# Patient Record
Sex: MALE | Race: WHITE | Employment: FULL TIME | URBAN - METROPOLITAN AREA
[De-identification: names, ages, dates, MRNs, and addresses within clinical notes are randomized per-mention and may not be internally consistent; named-entity substitution may affect disease eponyms.]

---

## 2017-01-03 ENCOUNTER — OFFICE VISIT (OUTPATIENT)
Dept: PAIN MANAGEMENT | Age: 62
End: 2017-01-03

## 2017-01-03 VITALS
SYSTOLIC BLOOD PRESSURE: 153 MMHG | WEIGHT: 196 LBS | HEIGHT: 70 IN | HEART RATE: 79 BPM | BODY MASS INDEX: 28.06 KG/M2 | DIASTOLIC BLOOD PRESSURE: 78 MMHG

## 2017-01-03 DIAGNOSIS — Z79.899 ENCOUNTER FOR LONG-TERM (CURRENT) DRUG USE: ICD-10-CM

## 2017-01-03 DIAGNOSIS — Z79.899 ENCOUNTER FOR LONG-TERM (CURRENT) USE OF HIGH-RISK MEDICATION: ICD-10-CM

## 2017-01-03 DIAGNOSIS — M47.816 SPONDYLOSIS OF LUMBAR REGION WITHOUT MYELOPATHY OR RADICULOPATHY: Primary | ICD-10-CM

## 2017-01-03 DIAGNOSIS — M54.16 LUMBAR RADICULITIS: ICD-10-CM

## 2017-01-03 DIAGNOSIS — G89.4 CHRONIC PAIN SYNDROME: ICD-10-CM

## 2017-01-03 LAB
ALCOHOL UR POC: NORMAL
AMPHETAMINES UR POC: NORMAL
BARBITURATES UR POC: NORMAL
BENZODIAZEPINES UR POC: NORMAL
BUPRENORPHINE UR POC: NORMAL
CANNABINOIDS UR POC: NORMAL
CARISOPRODOL UR POC: NORMAL
COCAINE UR POC: NORMAL
FENTANYL UR POC: NORMAL
MDMA/ECSTASY UR POC: NORMAL
METHADONE UR POC: NORMAL
METHAMPHETAMINE UR POC: NORMAL
METHYLPHENIDATE UR POC: NORMAL
OPIATES UR POC: NORMAL
OXYCODONE UR POC: NORMAL
PHENCYCLIDINE UR POC: NORMAL
PROPOXYPHENE UR POC: NORMAL
TRAMADOL UR POC: NORMAL
TRICYCLICS UR POC: NORMAL

## 2017-01-03 RX ORDER — VARDENAFIL HYDROCHLORIDE 20 MG/1
20 TABLET ORAL AS NEEDED
Qty: 10 TAB | Refills: 12 | Status: SHIPPED | OUTPATIENT
Start: 2017-01-03 | End: 2017-12-11 | Stop reason: ALTCHOICE

## 2017-01-03 RX ORDER — HYDROCODONE BITARTRATE AND ACETAMINOPHEN 10; 325 MG/1; MG/1
.5-1 TABLET ORAL
Qty: 180 TAB | Refills: 0 | Status: SHIPPED | OUTPATIENT
Start: 2017-01-20 | End: 2017-01-03 | Stop reason: SDUPTHER

## 2017-01-03 RX ORDER — HYDROCODONE BITARTRATE AND ACETAMINOPHEN 10; 325 MG/1; MG/1
.5-1 TABLET ORAL
Qty: 180 TAB | Refills: 0 | Status: SHIPPED | OUTPATIENT
Start: 2017-03-19 | End: 2017-03-30 | Stop reason: SDUPTHER

## 2017-01-03 RX ORDER — HYDROCODONE BITARTRATE AND ACETAMINOPHEN 10; 325 MG/1; MG/1
.5-1 TABLET ORAL
Qty: 180 TAB | Refills: 0 | Status: SHIPPED | OUTPATIENT
Start: 2017-02-18 | End: 2017-01-03 | Stop reason: SDUPTHER

## 2017-01-03 NOTE — PROGRESS NOTES
Nursing Notes    Patient presents to the office today in follow-up. Patient rates his pain at 5/10 on the numerical pain scale. Reviewed medications with counts as follows:    Rx Date filled Qty Dispensed Pill Count Last Dose Short   norco 10/325 mg  12/22/16 180 102 today no                                   POC UDS was performed in office today    Any new labs or imaging since last appointment? YES. Pt states that he had a chest x-ray done with work for a physical    Have you been to an emergency room (ER) or urgent care clinic since your last visit? NO            Have you been hospitalized since your last visit? NO     If yes, where, when, and reason for visit? Have you seen or consulted any other health care providers outside of the [address] [place] [address]  since your last visit? NO     If yes, where, when, and reason for visit? HM deferred to pcp.

## 2017-01-03 NOTE — MR AVS SNAPSHOT
Visit Information Date & Time Provider Department Dept. Phone Encounter #  
 1/3/2017  8:20 AM Idolina Pals Dartha Boas, PA-C [address] for Pain Management [phone] [id number] Follow-up Instructions Return in about 3 months (around 4/3/2017). Follow-up and Disposition History Your Appointments 3/30/2017  8:00 AM  
Follow Up with Marine Leon PA-C [address] for Pain Management (GABY SCHEDULING) Appt Note: Return in about 3 months (around 4/3/2017). per KG. ..mom  
 [address] [facility] [address]  
[phone] [id number] [id number] 5/25/2017  8:30 AM  
PHYSICAL with gS Garza MD  
Internist of [address]) Appt Note: ov [address]; rpe [address] rd  
 [address] [facility], [address] [address] [address] [place]  
  
   
 [address], [address] Upcoming Health Maintenance Date Due Hepatitis C Screening 1955 COLONOSCOPY 11/16/2020 DTaP/Tdap/Td series (2 - Td) 11/14/2026 Allergies as of 1/3/2017  Review Complete On: 1/3/2017 By: Crystal Johnson LPN Severity Noted Reaction Type Reactions Lisinopril    Other (comments) ED Current Immunizations  Reviewed on 5/16/2016 Name Date H1N1 FLU VACCINE 11/11/2009 Influenza Vaccine (Quad) PF 11/14/2016, 11/4/2015 Influenza Vaccine Split 11/21/2012 Influenza Vaccine Whole 10/28/2009 Tdap 11/14/2016 Zoster 7/6/2012  9:12 AM  
  
 Not reviewed this visit You Were Diagnosed With   
  
 Codes Comments Spondylosis of lumbar region without myelopathy or radiculopathy    -  Primary ICD-10-CM: M47.816 ICD-9-CM: 615. 3 Chronic pain syndrome     ICD-10-CM: G89.4 ICD-9-CM: 338.4 Lumbar radiculitis     ICD-10-CM: M54.16 
ICD-9-CM: 724.4 Encounter for long-term (current) drug use     ICD-10-CM: Z79.899 ICD-9-CM: V58.69 Vitals BP Pulse Height(growth percentile) Weight(growth percentile) BMI Smoking Status 153/78 79 5' 10\" (1.778 m) 196 lb (88.9 kg) 28.12 kg/m2 Former Smoker BMI and BSA Data Body Mass Index Body Surface Area  
 28.12 kg/m 2 2.1 m 2 Preferred Pharmacy Pharmacy Name Phone [address], [address] [place] [address] [phone] Your Updated Medication List  
  
   
This list is accurate as of: 1/3/17  9:08 AM.  Always use your most recent med list.  
  
  
  
  
 atorvastatin 10 mg tablet Commonly known as:  LIPITOR Take 1 Tab by mouth daily. CIALIS 5 mg tablet Generic drug:  tadalafil  
take 1 tablet by mouth once daily DEPO-TESTOSTERONE 100 mg/mL injection Generic drug:  testosterone cypionate  
inject 100 milligrams intramuscularly every 10 days  
  
 diazePAM 10 mg tablet Commonly known as:  VALIUM Take 1 Tab by mouth every six (6) hours as needed for Anxiety. Max Daily Amount: 40 mg. FISH OIL 1,000 mg Cap Generic drug:  omega-3 fatty acids-vitamin e Take 1 Cap by mouth. HYDROcodone-acetaminophen  mg tablet Commonly known as:  Racheal Freddy Take 0.5-1 Tabs by mouth every four (4) hours as needed for Pain (chronic) for up to 30 days. Indications: PAIN Start taking on:  3/19/2017 * losartan-hydroCHLOROthiazide 100-25 mg per tablet Commonly known as:  HYZAAR Take 1 Tab by mouth daily. * losartan-hydroCHLOROthiazide 100-12.5 mg per tablet Commonly known as:  HYZAAR  
take 1 tablet by mouth once daily MULTI FOR HIM PO Take  by mouth. Safety Needles 18 gauge x 1 1/2\" Ndle Commonly known as:  BD SAFETYGLIDE NEEDLE Use to draw up testosterone and then change needle to give testosterone. Syringe (Disposable) 3 mL Syrg Pt to use with Testosterone injections. TENS Units Luis Angel Safe Use as directed  
  
 vardenafil 20 mg tablet Commonly known as:  LEVITRA Take 20 mg by mouth as needed. * Notice: This list has 2 medication(s) that are the same as other medications prescribed for you. Read the directions carefully, and ask your doctor or other care provider to review them with you. Prescriptions Printed Refills HYDROcodone-acetaminophen (NORCO)  mg tablet 0 Starting on: 3/19/2017 Sig: Take 0.5-1 Tabs by mouth every four (4) hours as needed for Pain (chronic) for up to 30 days. Indications: PAIN Class: Print Route: Oral  
  
Follow-up Instructions Return in about 3 months (around 4/3/2017). Patient Instructions Plan: 
Continue same medications as prescribed for chronic pain 
Consider trying Hysingla ER and reducing your Norco 
Follow up in 3 months or sooner if needed Regular exercise and attention to emotional health and diet remain the most effective ways to treat chronic pain of all kinds You may contact me with questions or concerns through [address] Patient Instructions History Introducing [address]! Dear Colon: Thank you for requesting a esolidar account. Our records indicate that you already have an active esolidar account. You can access your account anytime at [email or web address] Did you know that you can access your hospital and ER discharge instructions at any time in esolidar? You can also review all of your test results from your hospital stay or ER visit. Additional Information If you have questions, please visit the Frequently Asked Questions section of the esolidar website at [email or web address] Remember, esolidar is NOT to be used for urgent needs. For medical emergencies, dial 911. Now available from your iPhone and Android! Please provide this summary of care documentation to your next provider. Your primary care clinician is listed as Timmy Christianson.  If you have any questions after today's visit, please call [phone].

## 2017-01-03 NOTE — PROGRESS NOTES
HISTORY OF PRESENT ILLNESS  Abraham Nava is a 57 y.o. male. Patient presents for follow up of chronic pain due to lumbar spondylosis, lumbar postlaminectomy syndrome, chronic right lower radiculopathy, and right hip osteoarthritis. He reports that his pain has been worse over the past few days, to which he attributes the recent rainy, cold weather. Pain continues to predominate in the lower back and right hip, radiating to anterior thigh. He describes his pain as shooting, stabbing, and stiff. Pain worsens with prolonged standing, bending, and squatting. He denies saddle anesthesia or bowel/bladder dysfunction. Pt reports average of 3-6/10 pain scale most days depending on activity. He continues to work full time as a  with [employer]. Medications continue to work well for pain control overall. Abraham Nava is tolerating medications well, with no untoward side effects noted. He is able to stay more active with less discomfort with these current doses. The patient reports an average of 50% relief with this regimen. Most recent UDS and  were consistent with prescribed medications. Pill counts are appropriate. He is informed of side effects, risks, and benefits of this regimen, and emphasizes that he derives a significant improvement in functionality and quality of life, and notes that non-opioid medications and therapies in the past have not offered significant benefit. We discussed changing his current regimen of six Norco per day to a long acting Hydrocodone to reduce his pill burden and acetaminophen exposure. He has tried and failed morphine, oxycodone, and tramadol in the past. He will consider trying Hysingla ER. We discussed the potential side effects, risks, and benefits of long-acting opioid management for chronic pain. He denies new or worsening insomnia or constipation issues. He denies any falls, injuries, or hospitalizations since the last visit.   We discussed his elevated blood pressure today--he reports that Dr. Yahir Monroe recently raised his Hyzaar dose to 20/25 per day. A total of 40 minutes was spent with the patient of which more than 50% of the time was spent counseling the patient. HPI--see above    ROS  Constitutional: Positive for malaise/fatigue. HENT: Negative for ear discharge and ear pain. Snoring   Eyes: Positive for blurred vision. Glasses   Genitourinary: Negative for dysuria and hematuria. Erectile Dysfunction   Musculoskeletal: Positive for myalgias, back pain, joint pain and neck pain. Stiffness  cramping   Neurological: Positive for tingling (legs) and sensory change (numbness legs). Negative for seizures and loss of consciousness. Physical Exam  Constitutional: He is oriented to person, place, and time. He appears well-developed and well-nourished. No distress. HENT:   Head: Normocephalic and atraumatic. Eyes: EOM are normal.   Musculoskeletal:        Right hip: He exhibits tenderness. Lumbar back: He exhibits decreased range of motion, bony tenderness, pain and spasm. Back:         Legs:  Areas of tenderness noted with red arrows  Marked spasms of lumbar paraspinous musculature noted   Brisk sacroiliac tenderness noted on right   Neurological: He is alert and oriented to person, place, and time. He displays no atrophy. No cranial nerve deficit or sensory deficit. He exhibits normal muscle tone. Gait abnormal. Coordination normal.   Psychiatric: He has a normal mood and affect. His behavior is normal. Judgment and thought content normal.   ASSESSMENT and PLAN    ICD-10-CM ICD-9-CM    1. Spondylosis of lumbar region without myelopathy or radiculopathy M47.816 721.3    2. Chronic pain syndrome G89.4 338.4 HYDROcodone-acetaminophen (NORCO)  mg tablet      DISCONTINUED: HYDROcodone-acetaminophen (NORCO)  mg tablet      DISCONTINUED: HYDROcodone-acetaminophen (NORCO)  mg tablet   3.  Lumbar radiculitis M54.16 724.4    4.  Encounter for long-term (current) drug use Z79.899 V58.69       Plan:  Continue same medications as prescribed for chronic pain  Consider trying Hysingla ER and reducing your Norco  Follow up in 3 months or sooner if needed  Regular exercise and attention to emotional health and diet remain the most effective ways to treat chronic pain of all kinds  You may contact me with questions or concerns through [address]

## 2017-01-03 NOTE — PATIENT INSTRUCTIONS
Plan:  Continue same medications as prescribed for chronic pain  Consider trying Hysingla ER and reducing your Norco  Follow up in 3 months or sooner if needed  Regular exercise and attention to emotional health and diet remain the most effective ways to treat chronic pain of all kinds  You may contact me with questions or concerns through [address]

## 2017-01-17 ENCOUNTER — TELEPHONE (OUTPATIENT)
Dept: INTERNAL MEDICINE CLINIC | Age: 62
End: 2017-01-17

## 2017-01-18 NOTE — TELEPHONE ENCOUNTER
Testosterone has been approved by his insurance from 11/19/16-1/18/2018.  Pts dispensing pharmacy is aware of approval. They will get it ready for him and call him to  when ready

## 2017-02-23 RX ORDER — DIAZEPAM 10 MG/1
10 TABLET ORAL
Qty: 30 TAB | Refills: 0 | Status: SHIPPED | OUTPATIENT
Start: 2017-02-23 | End: 2017-03-07 | Stop reason: SDUPTHER

## 2017-03-07 RX ORDER — DIAZEPAM 10 MG/1
TABLET ORAL
Qty: 30 TAB | Refills: 1 | OUTPATIENT
Start: 2017-03-07 | End: 2017-06-01 | Stop reason: SDUPTHER

## 2017-03-30 ENCOUNTER — OFFICE VISIT (OUTPATIENT)
Dept: PAIN MANAGEMENT | Age: 62
End: 2017-03-30

## 2017-03-30 VITALS
DIASTOLIC BLOOD PRESSURE: 94 MMHG | BODY MASS INDEX: 28.06 KG/M2 | SYSTOLIC BLOOD PRESSURE: 156 MMHG | HEIGHT: 70 IN | WEIGHT: 196 LBS | HEART RATE: 100 BPM

## 2017-03-30 DIAGNOSIS — Z98.1 S/P LUMBAR SPINAL FUSION: ICD-10-CM

## 2017-03-30 DIAGNOSIS — M47.816 SPONDYLOSIS OF LUMBAR REGION WITHOUT MYELOPATHY OR RADICULOPATHY: Primary | ICD-10-CM

## 2017-03-30 DIAGNOSIS — G89.4 CHRONIC PAIN SYNDROME: ICD-10-CM

## 2017-03-30 DIAGNOSIS — Z79.899 ENCOUNTER FOR LONG-TERM (CURRENT) DRUG USE: ICD-10-CM

## 2017-03-30 DIAGNOSIS — M54.16 LUMBAR RADICULITIS: ICD-10-CM

## 2017-03-30 DIAGNOSIS — R68.89 FLU-LIKE SYMPTOMS: ICD-10-CM

## 2017-03-30 RX ORDER — HYDROCODONE BITARTRATE AND ACETAMINOPHEN 10; 325 MG/1; MG/1
.5-1 TABLET ORAL
Qty: 180 TAB | Refills: 0 | Status: SHIPPED | OUTPATIENT
Start: 2017-04-17 | End: 2017-03-30 | Stop reason: SDUPTHER

## 2017-03-30 RX ORDER — OSELTAMIVIR PHOSPHATE 75 MG/1
75 CAPSULE ORAL 2 TIMES DAILY
Qty: 10 CAP | Refills: 0 | Status: SHIPPED | OUTPATIENT
Start: 2017-03-30 | End: 2017-04-04

## 2017-03-30 RX ORDER — HYDROCODONE BITARTRATE AND ACETAMINOPHEN 10; 325 MG/1; MG/1
.5-1 TABLET ORAL
Qty: 180 TAB | Refills: 0 | Status: SHIPPED | OUTPATIENT
Start: 2017-06-14 | End: 2017-06-30 | Stop reason: SDUPTHER

## 2017-03-30 RX ORDER — HYDROCODONE BITARTRATE AND ACETAMINOPHEN 10; 325 MG/1; MG/1
.5-1 TABLET ORAL
Qty: 180 TAB | Refills: 0 | Status: SHIPPED | OUTPATIENT
Start: 2017-05-16 | End: 2017-03-30 | Stop reason: SDUPTHER

## 2017-03-30 NOTE — MR AVS SNAPSHOT
Visit Information Date & Time Provider Department Dept. Phone Encounter #  
 3/30/2017  8:00 AM Radha [address] for Pain Management [phone] [id number] Follow-up Instructions Return in about 3 months (around 6/30/2017). Your Appointments 5/25/2017  8:30 AM  
PHYSICAL with Chapin Delong MD  
Internist of [facility]) Appt Note: ov 6mos. rd; rpe 6mos. rd  
 [address], [address] Cheril Timbo [address] [place]  
  
   
 [address], [address] Upcoming Health Maintenance Date Due Hepatitis C Screening 1955 COLONOSCOPY 11/16/2020 DTaP/Tdap/Td series (2 - Td) 11/14/2026 Allergies as of 3/30/2017  Review Complete On: 1/3/2017 By: Jorge Lizama LPN Severity Noted Reaction Type Reactions Lisinopril    Other (comments) ED Current Immunizations  Reviewed on 5/16/2016 Name Date H1N1 FLU VACCINE 11/11/2009 Influenza Vaccine (Quad) PF 11/14/2016, 11/4/2015 Influenza Vaccine Split 11/21/2012 Influenza Vaccine Whole 10/28/2009 Tdap 11/14/2016 Zoster 7/6/2012  9:12 AM  
  
 Not reviewed this visit You Were Diagnosed With   
  
 Codes Comments Spondylosis of lumbar region without myelopathy or radiculopathy    -  Primary ICD-10-CM: M47.816 ICD-9-CM: 945. 3 Chronic pain syndrome     ICD-10-CM: G89.4 ICD-9-CM: 338.4 Lumbar radiculitis     ICD-10-CM: M54.16 
ICD-9-CM: 724.4 S/P lumbar spinal fusion     ICD-10-CM: Z98.1 ICD-9-CM: V45.4 Encounter for long-term (current) drug use     ICD-10-CM: Z79.899 ICD-9-CM: V58.69 Flu-like symptoms     ICD-10-CM: R68.89 ICD-9-CM: 780.99 Vitals BP Pulse Height(growth percentile) Weight(growth percentile) BMI Smoking Status (!) 156/94 100 5' 10\" (1.778 m) 196 lb (88.9 kg) 28.12 kg/m2 Former Smoker Vitals History BMI and BSA Data Body Mass Index Body Surface Area  
 28.12 kg/m 2 2.1 m 2 Preferred Pharmacy Pharmacy Name Phone [address], [address] [place] [address] [phone] Your Updated Medication List  
  
   
This list is accurate as of: 3/30/17  8:39 AM.  Always use your most recent med list.  
  
  
  
  
 atorvastatin 10 mg tablet Commonly known as:  LIPITOR Take 1 Tab by mouth daily. CIALIS 5 mg tablet Generic drug:  tadalafil  
take 1 tablet by mouth once daily DEPO-TESTOSTERONE 100 mg/mL injection Generic drug:  testosterone cypionate  
inject 100 milligrams intramuscularly every 10 days  
  
 diazePAM 10 mg tablet Commonly known as:  VALIUM  
take 1 tablet by mouth every 6 hours if needed for anxiety FISH OIL 1,000 mg Cap Generic drug:  omega-3 fatty acids-vitamin e Take 1 Cap by mouth. HYDROcodone-acetaminophen  mg tablet Commonly known as:  Edda Arts Take 0.5-1 Tabs by mouth every four (4) hours as needed for Pain (chronic) for up to 30 days. Indications: Pain Start taking on:  6/14/2017 * losartan-hydroCHLOROthiazide 100-25 mg per tablet Commonly known as:  HYZAAR Take 1 Tab by mouth daily. * losartan-hydroCHLOROthiazide 100-12.5 mg per tablet Commonly known as:  HYZAAR  
take 1 tablet by mouth once daily MULTI FOR HIM PO Take  by mouth. oseltamivir 75 mg capsule Commonly known as:  TAMIFLU Take 1 Cap by mouth two (2) times a day for 5 days. Safety Needles 18 gauge x 1 1/2\" Ndle Commonly known as:  BD SAFETYGLIDE NEEDLE Use to draw up testosterone and then change needle to give testosterone. Syringe (Disposable) 3 mL Syrg Pt to use with Testosterone injections. TENS Units Elinda Plan Use as directed  
  
 vardenafil 20 mg tablet Commonly known as:  LEVITRA Take 20 mg by mouth as needed. * Notice:   This list has 2 medication(s) that are the same as other medications prescribed for you. Read the directions carefully, and ask your doctor or other care provider to review them with you. Prescriptions Printed Refills HYDROcodone-acetaminophen (NORCO)  mg tablet 0 Starting on: 6/14/2017 Sig: Take 0.5-1 Tabs by mouth every four (4) hours as needed for Pain (chronic) for up to 30 days. Indications: Pain Class: Print Route: Oral  
  
Prescriptions Sent to Pharmacy Refills  
 oseltamivir (TAMIFLU) 75 mg capsule 0 Sig: Take 1 Cap by mouth two (2) times a day for 5 days. Class: Normal  
 Pharmacy: [facility] [address] #: [phone] Route: Oral  
  
Follow-up Instructions Return in about 3 months (around 6/30/2017). Patient Instructions Plan: 
Continue same medications as prescribed for chronic pain Given your low grade fever, body aches, and cough, as well as exposure to flu, we will start Tamiflu for five days Get plenty of rest, fluids, and take ibuprofen for fever and aches See your PCP if symptoms do not improve in 5 days, or if they worsen Delsym (extended-release dextromethorphan) is the strongest over the counter cough syrup and is safe to take with your pain medications Follow up in 3 months or sooner if needed Regular exercise and attention to emotional health and diet remain the most effective ways to treat chronic pain of all kinds You may contact me with questions or concerns through [address] [facility] [place] [facility]! Dear Abraham: Thank you for requesting a C3 Jian account. Our records indicate that you already have an active C3 Jian account. You can access your account anytime at [email or web address]/A & A Custom Cornhole Did you know that you can access your hospital and ER discharge instructions at any time in C3 Jian? You can also review all of your test results from your hospital stay or ER visit. Additional Information If you have questions, please visit the Frequently Asked Questions section of the XM Radiohart website at [email or web address] Remember, [email or web address] is NOT to be used for urgent needs. For medical emergencies, dial 911. Now available from your iPhone and Android! Please provide this summary of care documentation to your next provider. Your primary care clinician is listed as Chanda Stewart. If you have any questions after today's visit, please call [phone].

## 2017-03-30 NOTE — PATIENT INSTRUCTIONS
Plan:  Continue same medications as prescribed for chronic pain  Given your low grade fever, body aches, and cough, as well as exposure to flu, we will start Tamiflu for five days  Get plenty of rest, fluids, and take ibuprofen for fever and aches  See your PCP if symptoms do not improve in 5 days, or if they worsen  Delsym (extended-release dextromethorphan) is the strongest over the counter cough syrup and is safe to take with your pain medications  Follow up in 3 months or sooner if needed  Regular exercise and attention to emotional health and diet remain the most effective ways to treat chronic pain of all kinds  You may contact me with questions or concerns through [employer]

## 2017-03-30 NOTE — PROGRESS NOTES
HISTORY OF PRESENT ILLNESS  Abraham Carter is a 57 y.o. male. Patient presents for follow up of chronic pain due to lumbar spondylosis, lumbar postlaminectomy syndrome, chronic right lower radiculopathy, and right hip osteoarthritis. He reports that he is feeling quite poorly today, noting that he woke up with chills, body aches, and dry cough. His daughter is currently at home with influenza. He has received his vaccine, however, given these symptoms, we will start him on Tamiflu for five days, with instructions on follow-up if symptoms worsen or do not improve. We also discussed the normal course of influenza, with instructions on self-care during the course of the illness. Back pain remains constant but stable, although his illness today has amplified achiness and discomfort. Pain continues to predominate in the lower back and right hip, radiating to anterior thigh. He describes his pain as shooting, stabbing, and stiff. Pain worsens with prolonged standing, bending, and squatting. He denies saddle anesthesia or bowel/bladder dysfunction. Pt reports average of 3-6/10 pain scale most days depending on activity. He continues to work full time as a  with [employer]. Medications continue to work well for pain control overall. Abraham Carter is tolerating medications well, with no untoward side effects noted. He is able to stay more active with less discomfort with these current doses. The patient reports an average of 60% relief with this regimen. Most recent UDS and  were consistent with prescribed medications. Pill counts are appropriate. He is informed of side effects, risks, and benefits of this regimen, and emphasizes that he derives a significant improvement in functionality and quality of life, and notes that non-opioid medications and therapies in the past have not offered significant benefit. He denies new or worsening insomnia or constipation issues.  He denies any falls, injuries, or hospitalizations since the last visit. HPI--see above    ROS  Constitutional: Positive for malaise/fatigue. HENT: Negative for ear discharge and ear pain. Snoring   Eyes: Positive for blurred vision. Glasses   Genitourinary: Negative for dysuria and hematuria. Erectile Dysfunction   Musculoskeletal: Positive for myalgias, back pain, joint pain and neck pain. Stiffness  cramping   Neurological: Positive for tingling (legs) and sensory change (numbness legs). Negative for seizures and loss of consciousness. Physical Exam  Constitutional: He is oriented to person, place, and time. He appears well-developed and well-nourished. Ill-appearing. HENT:   Head: Normocephalic and atraumatic. Eyes: EOM are normal.   Respiratory: no rales, rhonchi, or wheezes noted. No distress  Musculoskeletal:        Right hip: He exhibits tenderness. Lumbar back: He exhibits decreased range of motion, bony tenderness, pain and spasm. Back:         Legs:  Areas of tenderness noted with red arrows  Marked spasms of lumbar paraspinous musculature noted    Neurological: He is alert and oriented to person, place, and time. He displays no atrophy. No cranial nerve deficit or sensory deficit. He exhibits normal muscle tone. Gait abnormal. Coordination normal.   Psychiatric: He has a normal mood and affect. His behavior is normal. Judgment and thought content normal.   ASSESSMENT and PLAN  Encounter Diagnoses     ICD-10-CM ICD-9-CM   1. Spondylosis of lumbar region without myelopathy or radiculopathy M47.816 721.3   2. Chronic pain syndrome G89.4 338.4   3. Lumbar radiculitis M54.16 724.4   4. S/P lumbar spinal fusion Z98.1 V45.4   5. Encounter for long-term (current) drug use Z79.899 V58.69   6.  Flu-like symptoms R68.89 780.99      Plan:  Continue same medications as prescribed for chronic pain  Given your low grade fever, body aches, and cough, as well as exposure to flu, we will start Tamiflu for five days  Get plenty of rest, fluids, and take ibuprofen for fever and aches  See your PCP if symptoms do not improve in 5 days, or if they worsen  Follow up in 3 months or sooner if needed  Regular exercise and attention to emotional health and diet remain the most effective ways to treat chronic pain of all kinds  You may contact me with questions or concerns through [address]

## 2017-03-30 NOTE — PROGRESS NOTES
Nursing Notes    Patient presents to the office today in follow-up. Patient rates his pain at 7/10 on the numerical pain scale. Reviewed medications with counts as follows:    Rx Date filled Qty Dispensed Pill Count Last Dose Short   norco 10/325 mg  03/19/17 180 114 today no                                     POC UDS was not performed in office today    Any new labs or imaging since last appointment? NO    Have you been to an emergency room (ER) or urgent care clinic since your last visit? NO            Have you been hospitalized since your last visit? NO     If yes, where, when, and reason for visit? Have you seen or consulted any other health care providers outside of the [address] [place] [address]  since your last visit? NO     If yes, where, when, and reason for visit? HM deferred to pcp.

## 2017-04-09 RX ORDER — ATORVASTATIN CALCIUM 10 MG/1
TABLET, FILM COATED ORAL
Qty: 90 TAB | Refills: 3 | Status: SHIPPED | OUTPATIENT
Start: 2017-04-09 | End: 2018-04-19 | Stop reason: SDUPTHER

## 2017-04-20 NOTE — TELEPHONE ENCOUNTER
From: Abraham Hatfield  To: Jennyfer Barrera MD  Sent: 4/20/2017 4:05 PM EDT  Subject: Medication Renewal Request    Original authorizing provider: MD Abraham Mccarthy Shanon Primrose would like a refill of the following medications:  DEPO-TESTOSTERONE 100 mg/mL injection Jennyfer Barrera MD]  Safety Whiteside (BD SAFETYGLIDE NEEDLE) 18 gauge x 1 1/2\" ndle Jennyfer Barrera MD]    Preferred pharmacy: [address] [place] [address] Ave:

## 2017-04-21 RX ORDER — TESTOSTERONE CYPIONATE 100 MG/ML
100 INJECTION, SOLUTION INTRAMUSCULAR
Qty: 10 VIAL | Refills: 5 | Status: SHIPPED | OUTPATIENT
Start: 2017-04-21 | End: 2017-10-21 | Stop reason: SDUPTHER

## 2017-04-21 RX ORDER — NEEDLES, SAFETY 18GX1 1/2"
NEEDLE, DISPOSABLE MISCELLANEOUS
Qty: 25 PEN NEEDLE | Refills: 1 | Status: SHIPPED | OUTPATIENT
Start: 2017-04-20 | End: 2017-10-12 | Stop reason: SDUPTHER

## 2017-05-17 ENCOUNTER — HOSPITAL ENCOUNTER (OUTPATIENT)
Dept: LAB | Age: 62
Discharge: HOME | End: 2017-05-17
Payer: COMMERCIAL

## 2017-05-17 DIAGNOSIS — E78.5 DYSLIPIDEMIA: ICD-10-CM

## 2017-05-17 DIAGNOSIS — E29.1 HYPOGONADISM MALE: ICD-10-CM

## 2017-05-17 DIAGNOSIS — Z11.59 NEED FOR HEPATITIS C SCREENING TEST: ICD-10-CM

## 2017-05-17 LAB
CHOLEST SERPL-MCNC: 139 MG/DL
ERYTHROCYTE [DISTWIDTH] IN BLOOD BY AUTOMATED COUNT: 13.2 % (ref 11.6–14.5)
HCT VFR BLD AUTO: 45.8 % (ref 36–48)
HCV AB SER IA-ACNC: 0.04 INDEX
HCV AB SERPL QL IA: NEGATIVE
HCV COMMENT,HCGAC: NORMAL
HDLC SERPL-MCNC: 45 MG/DL (ref 40–60)
HDLC SERPL: 3.1 {RATIO} (ref 0–5)
HGB BLD-MCNC: 15.7 G/DL (ref 13–16)
LDLC SERPL CALC-MCNC: 57.6 MG/DL (ref 0–100)
LIPID PROFILE,FLP: ABNORMAL
MCH RBC QN AUTO: 31.7 PG (ref 24–34)
MCHC RBC AUTO-ENTMCNC: 34.3 G/DL (ref 31–37)
MCV RBC AUTO: 92.3 FL (ref 74–97)
PLATELET # BLD AUTO: 217 K/UL (ref 135–420)
PMV BLD AUTO: 11.6 FL (ref 9.2–11.8)
PSA SERPL-MCNC: 0.6 NG/ML (ref 0–4)
RBC # BLD AUTO: 4.96 M/UL (ref 4.7–5.5)
TRIGL SERPL-MCNC: 182 MG/DL (ref ?–150)
VLDLC SERPL CALC-MCNC: 36.4 MG/DL
WBC # BLD AUTO: 7.2 K/UL (ref 4.6–13.2)

## 2017-05-17 PROCEDURE — 86803 HEPATITIS C AB TEST: CPT | Performed by: INTERNAL MEDICINE

## 2017-05-17 PROCEDURE — 80061 LIPID PANEL: CPT | Performed by: INTERNAL MEDICINE

## 2017-05-17 PROCEDURE — 36415 COLL VENOUS BLD VENIPUNCTURE: CPT | Performed by: INTERNAL MEDICINE

## 2017-05-17 PROCEDURE — 84153 ASSAY OF PSA TOTAL: CPT | Performed by: INTERNAL MEDICINE

## 2017-05-17 PROCEDURE — 85027 COMPLETE CBC AUTOMATED: CPT | Performed by: INTERNAL MEDICINE

## 2017-05-22 NOTE — PROGRESS NOTES
57 y.o. WHITE OR  male who presents for RPE    He continues to see pain mgmt. He's active but no set exercise routine. Continues to go to pain clinic and well controlled there    No GI or  complaints. The fiber has really helped a lot. Continues on testosterone replacement     No polyuria, polydipsia, nocturia, vision change, not checking sugars at this time.  Weight stable as below    Past Medical History:   Diagnosis Date    Chronic back pain     Dr. Adrian Tinsley Dyslipidemia     Erectile dysfunction     Fatty liver     US 2002, serologies negative; fib-4 1.06 from 3/14    FHx: colon cancer     H/O bone scan 3/14    negative outside of [facility]    HTN (hypertension)     Hypogonadism male     negative pituitary MRI 2013    Internal hemorrhoids 11/15    Dr Hector Barlow    Lower urinary tract symptoms (LUTS) 5/16    mild    Lumbago     MRI 1/14 and CT 3/14 w lumbar spinal stenosis severe    Lumbar post-laminectomy syndrome     Lumbar spinal stenosis     Prediabetes     2 hr GTT nl 2011    Prediabetes     2 hr gtt 124 from 10/11    Rosacea     S/P lumbar spinal fusion     Spasm of muscle     Status post lumbar laminectomy     Thoracic or lumbosacral neuritis or radiculitis, unspecified      Past Surgical History:   Procedure Laterality Date    CHEST SURGERY PROCEDURE UNLISTED      resection of a RUL lung lesion for recurring polyps 1980s    Mireille Colvin right 9/14    HX COLONOSCOPY      negative Dr. Tiny Chun 2005, 2010; Dr Saran Hearn 11/15 hemorrhoids    Brooklynn Iván  2012    Dr. Gracia Bruce right buttock    HX LUMBAR LAMINECTOMY       Social History     Social History    Marital status:      Spouse name: N/A    Number of children: 4    Years of education: N/A     Occupational History    sup [employer]      Social History Main Topics    Smoking status: Former Smoker     Types: Cigarettes    Smokeless tobacco: Never Used    Alcohol use 0.0 oz/week Comment: social    Drug use: No    Sexual activity: Not on file     Other Topics Concern    Not on file     Social History Narrative     Allergies   Allergen Reactions    Lisinopril Other (comments)     ED      Current Outpatient Prescriptions   Medication Sig    Safety Liberty (BD SAFETYGLIDE NEEDLE) 18 gauge x 1 1/2\" ndle Use to draw up testosterone and then change needle to give testosterone.  atorvastatin (LIPITOR) 10 mg tablet take 1 tablet by mouth daily    [START ON 6/14/2017] HYDROcodone-acetaminophen (NORCO)  mg tablet Take 0.5-1 Tabs by mouth every four (4) hours as needed for Pain (chronic) for up to 30 days. Indications: Pain    diazePAM (VALIUM) 10 mg tablet take 1 tablet by mouth every 6 hours if needed for anxiety    vardenafil (LEVITRA) 20 mg tablet Take 20 mg by mouth as needed.  Syringe, Disposable, 3 mL syrg Pt to use with Testosterone injections.  losartan-hydroCHLOROthiazide (HYZAAR) 100-25 mg per tablet Take 1 Tab by mouth daily.  CIALIS 5 mg tablet take 1 tablet by mouth once daily    TENS Units georgie Use as directed    omega-3 fatty acids-vitamin e (FISH OIL) 1,000 mg Cap Take 1 Cap by mouth.  MULTIVITS/IRON FUM/FA/D3/LYCOP (MULTI FOR HIM PO) Take  by mouth.  testosterone cypionate (DEPO-TESTOSTERONE) 100 mg/mL injection 100 mcg by IntraMUSCular route every ten (10) days. Max Daily Amount: 100 mcg. No current facility-administered medications for this visit.       REVIEW OF SYSTEMS: colo 11/15 Dr Fareed Mayo, sees Dr Cruz [place] no vision change or eye pain  Oral  no mouth pain, tongue or tooth problems  Ears  no hearing loss, ear pain, fullness, no swallowing problems  Cardiac  no CP, PND, orthopnea, edema, palpitations or syncope  Chest  no breast masses  Resp  no wheezing, chronic coughing, dyspnea  GI  no heartburn, nausea, vomiting, change in bowel habits, bleeding, hemorrhoids  Urinary  no dysuria, hematuria, flank pain, urgency, frequency  Endo - no polyuria, polydipsia, nocturia, hot flashes    Visit Vitals    /70    Pulse 81    Temp 98.6 °F (37 °C) (Oral)    Ht 5' 10\" (1.778 m)    Wt 191 lb (86.6 kg)    SpO2 96%    BMI 27.41 kg/m2     Affect is appropriate. Mood stable  No apparent distress  HEENT --Anicteric sclerae. No thyromegaly, JVD, or bruits. Lungs --Clear to auscultation, normal percussion. Heart --Regular rate and rhythm, no murmurs, rubs, gallops, or clicks. Chest wall --Nontender to palpation. PMI normal.  Abdomen -- Soft and nontender, no hepatosplenomegaly or masses. Rectal showed guaiac neg brown stool, normal tone, prostate without asymmetry, nodularity, tenderness or enlargement  Extremities -- Without cyanosis, clubbing, edema. 2+ pulses equally and bilaterally.     LABS  From 10/11 showed        hba1c 5.8, ldl-p 1008, chol 197, tg 200, hdl 45, ldl-c 112,                   psa 0.70, 2 hr   From 6/12  showed  gluc 104, cr 0.94, gfr 90,  alt 20, hba1c 5.7, ldl-p 2204, chol 197, tg 225, hdl 45, ldl-c 107,  tsh 0.62  From 11/12 showed gluc 100, cr 0.94, gfr 89                                ldl-p 1356                       test 316  From 5/13  showed  gluc 95,   cr 0.86                         hba1c 5.6, ldl-p 1183,  chol 147, tg 171, hdl 53, ldl-c 60,            test 313  From 5/13 showed                                test 196, lh 3.0, psa 0.60  From 11/13 showed        hba1c 5.5,     chol 164, tg 165, hdl 47, ldl-c 84  From 3/14 showed   gluc 91,   cr 0.83, gfr 96,  alt 42,      chol 151, tg 160, hdl 42, ldl-c 77,   wbc 7.7,   hb 15.2, plt 249,          psa 0.60, esr 2, spep neg, cea 0.9, crp 1.30, IL-6 1.2  From 5/14 showed   gluc 156, cr 1.02, gfr>60, alt 61,                 wbc 12.8, hb 14.6, plt 231, b12 722, fol>24(on pred)  From 10/14 showed gluc 101, cr 0.90, gfr 93,  alt 45, hba1c 5.7  From 5/15 showed        hba1c 5.7,     chol 155, tg 157, hdl 41, ldl-c 83,   wbc 6.6,   hb 14.6, plt 191, test 103,         psa 0.80  From 11/15 showed        hba1c 5.6,     chol 152, tg 159, hdl 49, ldl-c 81,          test 228  From 5/16 showed           chol 142, tg 118, hdl 38, ldl-c 80,   wbc 6.8,   hb 15.7, plt 203, test 395,         psa 0.9  From 11/16 showed gluc 103, cr 1.14, gfr>60, alt 73,                 test 346    Results for orders placed or performed during the hospital encounter of 05/17/17   CBC W/O DIFF   Result Value Ref Range    WBC 7.2 4.6 - 13.2 K/uL    RBC 4.96 4.70 - 5.50 M/uL    HGB 15.7 13.0 - 16.0 g/dL    HCT 45.8 36.0 - 48.0 %    MCV 92.3 74.0 - 97.0 FL    MCH 31.7 24.0 - 34.0 PG    MCHC 34.3 31.0 - 37.0 g/dL    RDW 13.2 11.6 - 14.5 %    PLATELET 914 473 - 293 K/uL    MPV 11.6 9.2 - 11.8 FL   LIPID PANEL   Result Value Ref Range    LIPID PROFILE          Cholesterol, total 139 <200 MG/DL    Triglyceride 182 (H) <150 MG/DL    HDL Cholesterol 45 40 - 60 MG/DL    LDL, calculated 57.6 0 - 100 MG/DL    VLDL, calculated 36.4 MG/DL    CHOL/HDL Ratio 3.1 0 - 5.0     PSA DIAGNOSTIC (PROSTATIC SPECIFIC AG)   Result Value Ref Range    Prostate Specific Ag 0.6 0.0 - 4.0 ng/mL   HEPATITIS C AB   Result Value Ref Range    Hepatitis C virus Ab 0.04 <0.80 Index    Hep C  virus Ab Interp. NEGATIVE  NEG      Hep C  virus Ab comment           Patient Active Problem List   Diagnosis Code    Dyslipidemia E78.5    S/P lumbar spinal fusion Z98.1    Hypogonadism male E29.1    Erectile dysfunction N52.9    IFG (impaired fasting glucose) R73.01    Primary hypertension I10    Chronic pain syndrome G89.4    Spondylosis of lumbar region without myelopathy or radiculopathy M47.816    Encounter for long-term (current) drug use Z79.899    Lumbar radiculitis M54.16     ASSESSMENT AND PLAN:  1. Hypertension. Continue current regimen. 2.  Fatty liver. Wt loss, exercise, abstinence reiterated  3. Dyslipidemia. Continue current regimen. 4.  FH colon ca. F/U colo 2020  5. Prediabetes. Doing well  6.   ED. Continue prn levitra  7. Hypogonadism. Continue current regimen. 8.  Chronic pain. F/U pain clinic        RTC 11/17    Above conditions discussed at length and patient vocalized understanding.   All questions answered to patient satifaction

## 2017-05-25 ENCOUNTER — OFFICE VISIT (OUTPATIENT)
Dept: INTERNAL MEDICINE CLINIC | Age: 62
End: 2017-05-25

## 2017-05-25 VITALS
HEIGHT: 70 IN | WEIGHT: 191 LBS | OXYGEN SATURATION: 96 % | DIASTOLIC BLOOD PRESSURE: 70 MMHG | HEART RATE: 81 BPM | TEMPERATURE: 98.6 F | BODY MASS INDEX: 27.35 KG/M2 | SYSTOLIC BLOOD PRESSURE: 120 MMHG

## 2017-05-25 DIAGNOSIS — Z00.00 PHYSICAL EXAM: Primary | ICD-10-CM

## 2017-05-25 DIAGNOSIS — I10 ESSENTIAL HYPERTENSION: ICD-10-CM

## 2017-05-25 DIAGNOSIS — Z98.1 S/P LUMBAR SPINAL FUSION: ICD-10-CM

## 2017-05-25 DIAGNOSIS — N52.9 ERECTILE DYSFUNCTION, UNSPECIFIED ERECTILE DYSFUNCTION TYPE: ICD-10-CM

## 2017-05-25 DIAGNOSIS — G89.4 CHRONIC PAIN SYNDROME: ICD-10-CM

## 2017-05-25 DIAGNOSIS — E29.1 HYPOGONADISM MALE: ICD-10-CM

## 2017-05-25 DIAGNOSIS — R73.01 IFG (IMPAIRED FASTING GLUCOSE): ICD-10-CM

## 2017-05-25 DIAGNOSIS — E78.5 DYSLIPIDEMIA: ICD-10-CM

## 2017-05-25 NOTE — PROGRESS NOTES
1. Have you been to the ER, urgent care clinic or hospitalized since your last visit? NO.     2. Have you seen or consulted any other health care providers outside of the [facility] since your last visit (Include any pap smears or colon screening)? NO      Do you have an Advanced Directive? NO    Would you like information on Advanced Directives?  YES

## 2017-06-02 RX ORDER — DIAZEPAM 10 MG/1
10 TABLET ORAL
Qty: 30 TAB | Refills: 1 | OUTPATIENT
Start: 2017-06-02 | End: 2017-07-15 | Stop reason: SDUPTHER

## 2017-06-30 ENCOUNTER — OFFICE VISIT (OUTPATIENT)
Dept: PAIN MANAGEMENT | Age: 62
End: 2017-06-30

## 2017-06-30 VITALS
HEIGHT: 70 IN | WEIGHT: 191 LBS | DIASTOLIC BLOOD PRESSURE: 89 MMHG | HEART RATE: 81 BPM | BODY MASS INDEX: 27.35 KG/M2 | SYSTOLIC BLOOD PRESSURE: 153 MMHG

## 2017-06-30 DIAGNOSIS — M25.551 RIGHT HIP PAIN: ICD-10-CM

## 2017-06-30 DIAGNOSIS — G89.4 CHRONIC PAIN SYNDROME: ICD-10-CM

## 2017-06-30 DIAGNOSIS — M47.816 SPONDYLOSIS OF LUMBAR REGION WITHOUT MYELOPATHY OR RADICULOPATHY: Primary | ICD-10-CM

## 2017-06-30 DIAGNOSIS — Z79.899 ENCOUNTER FOR LONG-TERM (CURRENT) DRUG USE: ICD-10-CM

## 2017-06-30 DIAGNOSIS — Z98.1 S/P LUMBAR SPINAL FUSION: ICD-10-CM

## 2017-06-30 DIAGNOSIS — M54.16 LUMBAR RADICULITIS: ICD-10-CM

## 2017-06-30 RX ORDER — HYDROCODONE BITARTRATE AND ACETAMINOPHEN 10; 325 MG/1; MG/1
.5-1 TABLET ORAL
Qty: 180 TAB | Refills: 0 | Status: SHIPPED | OUTPATIENT
Start: 2017-08-11 | End: 2017-06-30 | Stop reason: SDUPTHER

## 2017-06-30 RX ORDER — HYDROCODONE BITARTRATE AND ACETAMINOPHEN 10; 325 MG/1; MG/1
.5-1 TABLET ORAL
Qty: 180 TAB | Refills: 0 | Status: SHIPPED | OUTPATIENT
Start: 2017-07-13 | End: 2021-08-27

## 2017-06-30 RX ORDER — HYDROCODONE BITARTRATE AND ACETAMINOPHEN 10; 325 MG/1; MG/1
.5-1 TABLET ORAL
Qty: 180 TAB | Refills: 0 | Status: SHIPPED | OUTPATIENT
Start: 2017-09-09 | End: 2017-09-28 | Stop reason: SDUPTHER

## 2017-06-30 RX ORDER — HYDROCODONE BITARTRATE AND ACETAMINOPHEN 10; 325 MG/1; MG/1
.5-1 TABLET ORAL
Qty: 180 TAB | Refills: 0 | Status: SHIPPED | OUTPATIENT
Start: 2017-07-13 | End: 2017-06-30 | Stop reason: SDUPTHER

## 2017-06-30 RX ORDER — NALOXONE HYDROCHLORIDE 4 MG/.1ML
4 SPRAY NASAL
Qty: 1 PACKAGE | Refills: 0 | Status: SHIPPED | OUTPATIENT
Start: 2017-06-30

## 2017-06-30 NOTE — PROGRESS NOTES
HISTORY OF PRESENT ILLNESS  Abraham Blancas is a 57 y.o. male. Patient presents for follow up of chronic pain due to lumbar spondylosis, lumbar postlaminectomy syndrome, chronic right lower radiculopathy, and right hip osteoarthritis. He reports that pain in the low back and right hip remains persistent and at times bothersome. However, he feels that his pain is well-controlled with his current regimen. Pain continues to predominate in the lower back and right hip, radiating to anterior thigh. He describes his pain as shooting, stabbing, and stiff. Pain worsens with prolonged standing, bending, and squatting. He denies saddle anesthesia or bowel/bladder dysfunction. Pt reports average of 4-5/10 pain scale most days depending on activity. He continues to work full time as a  with [employer]. Medications continue to work well for pain control overall. Abraham Blancas is tolerating medications well, with no untoward side effects noted. He is able to stay more active with less discomfort with these current doses. The patient reports an average of 7% relief with this regimen. Most recent UDS and  were consistent with prescribed medications. Pill counts are appropriate. He is informed of side effects, risks, and benefits of this regimen, and emphasizes that he derives a significant improvement in functionality and quality of life, and notes that non-opioid medications and therapies in the past have not offered significant benefit. We will prescribe Naloxone 4 mg nasal spray to use in case of accidental overdose. The patient is aware not to use Naloxone for any reasons other than opioid toxicity, as its use may precipitate withdrawals. The patient was instructed on directions and indications for use, and has also been advised to inform family members on how to use naloxone if overdose occurs. The patient expresses understanding. He denies new or worsening insomnia or constipation issues. He denies any falls, injuries, or hospitalizations since the last visit. HPI--see above    ROS  onstitutional: Positive for malaise/fatigue. HENT: Negative for ear discharge and ear pain. Snoring   Eyes: Positive for blurred vision. Glasses   Genitourinary: Negative for dysuria and hematuria. Erectile Dysfunction   Musculoskeletal: Positive for myalgias, back pain, joint pain and neck pain. Stiffness  cramping   Neurological: Positive for tingling (legs) and sensory change (numbness legs). Negative for seizures and loss of consciousness. Physical Exam  Constitutional: He is oriented to person, place, and time. He appears well-developed and well-nourished. Ill-appearing. HENT:   Head: Normocephalic and atraumatic. Eyes: EOM are normal.   Respiratory: no rales, rhonchi, or wheezes noted. No distress  Musculoskeletal:        Right hip: He exhibits tenderness. Lumbar back: He exhibits decreased range of motion, bony tenderness, pain and spasm. Back:         Legs:  Areas of tenderness noted with red arrows  Marked spasms of lumbar paraspinous musculature noted    Neurological: He is alert and oriented to person, place, and time. He displays no atrophy. No cranial nerve deficit or sensory deficit. He exhibits normal muscle tone. Gait abnormal. Coordination normal.   Psychiatric: He has a normal mood and affect. His behavior is normal. Judgment and thought content normal.   ASSESSMENT and PLAN    ICD-10-CM ICD-9-CM    1. Spondylosis of lumbar region without myelopathy or radiculopathy M47.816 721.3    2. Lumbar radiculitis M54.16 724.4    3. Encounter for long-term (current) drug use Z79.899 V58.69    4. Chronic pain syndrome G89.4 338.4 HYDROcodone-acetaminophen (NORCO)  mg tablet      DISCONTINUED: HYDROcodone-acetaminophen (NORCO)  mg tablet   5. S/P lumbar spinal fusion Z98.1 V45.4    6.  Right hip pain M25.551 719.45       Plan:  Continue same medications as prescribed for chronic pain  Per the [facility] recommendation, we will prescribe Naloxone 4 mg nasal spray to use in case of accidental overdose. Do no use Naloxone for any reasons other than opioid toxicity, as its use may precipitate withdrawals.     Follow up in 3 months or sooner if needed  Regular exercise and attention to emotional health and diet remain the most effective ways to treat chronic pain of all kinds  You may contact me with questions or concerns through [address]

## 2017-06-30 NOTE — PROGRESS NOTES
Nursing Notes    Patient presents to the office today in follow-up. Patient rates his pain at 2/10 on the numerical pain scale. Reviewed medications with counts as follows:    Rx Date filled Qty Dispensed Pill Count Last Dose Short   norco 10/325 mg 06/14/17 180 82 today no                                   POC UDS was performed in office today. Any new labs or imaging since last appointment? NO    Have you been to an emergency room (ER) or urgent care clinic since your last visit? NO            Have you been hospitalized since your last visit? NO     If yes, where, when, and reason for visit? Have you seen or consulted any other health care providers outside of the [address] [place] [address]  since your last visit? NO     If yes, where, when, and reason for visit? HM deferred to pcp.

## 2017-06-30 NOTE — PATIENT INSTRUCTIONS
Plan:  Continue same medications as prescribed for chronic pain  Per the [address] of Medicine recommendation, we will prescribe Naloxone 4 mg nasal spray to use in case of accidental overdose. Do no use Naloxone for any reasons other than opioid toxicity, as its use may precipitate withdrawals.     Follow up in 3 months or sooner if needed  Regular exercise and attention to emotional health and diet remain the most effective ways to treat chronic pain of all kinds  You may contact me with questions or concerns through [address]

## 2017-06-30 NOTE — MR AVS SNAPSHOT
Visit Information Date & Time Provider Department Dept. Phone Encounter #  
 6/30/2017  8:00 AM Valeria Busch [address] [place] [address] for Pain Management [phone] [id number] Follow-up Instructions Return in about 3 months (around 9/30/2017). Follow-up and Disposition History Upcoming Health Maintenance Date Due INFLUENZA AGE 9 TO ADULT 8/1/2017 COLONOSCOPY 11/16/2020 DTaP/Tdap/Td series (2 - Td) 11/14/2026 Allergies as of 6/30/2017  Review Complete On: 6/30/2017 By: Betzaida Fuller LPN Severity Noted Reaction Type Reactions Lisinopril    Other (comments) ED Current Immunizations  Reviewed on 5/16/2016 Name Date H1N1 FLU VACCINE 11/11/2009 Influenza Vaccine (Quad) PF 11/14/2016, 11/4/2015 Influenza Vaccine Split 11/21/2012 Influenza Vaccine Whole 10/28/2009 Tdap 11/14/2016 Zoster 7/6/2012  9:12 AM  
  
 Not reviewed this visit You Were Diagnosed With   
  
 Codes Comments Spondylosis of lumbar region without myelopathy or radiculopathy    -  Primary ICD-10-CM: M47.816 ICD-9-CM: 721.3 Lumbar radiculitis     ICD-10-CM: M54.16 
ICD-9-CM: 724.4 Encounter for long-term (current) drug use     ICD-10-CM: Z79.899 ICD-9-CM: V58.69 Chronic pain syndrome     ICD-10-CM: G89.4 ICD-9-CM: 338.4 S/P lumbar spinal fusion     ICD-10-CM: Z98.1 ICD-9-CM: V45.4 Right hip pain     ICD-10-CM: M25.551 ICD-9-CM: 719.45 Vitals BP Pulse Height(growth percentile) Weight(growth percentile) BMI Smoking Status 153/89 81 5' 10\" (1.778 m) 191 lb (86.6 kg) 27.41 kg/m2 Former Smoker BMI and BSA Data Body Mass Index Body Surface Area  
 27.41 kg/m 2 2.07 m 2 Preferred Pharmacy Pharmacy Name Phone [address], [address] [place] [address] [phone] Your Updated Medication List  
  
   
 This list is accurate as of: 6/30/17  8:38 AM.  Always use your most recent med list.  
  
  
  
  
 atorvastatin 10 mg tablet Commonly known as:  LIPITOR  
take 1 tablet by mouth daily CIALIS 5 mg tablet Generic drug:  tadalafil  
take 1 tablet by mouth once daily  
  
 diazePAM 10 mg tablet Commonly known as:  VALIUM Take 1 Tab by mouth every six (6) hours as needed for Anxiety. Max Daily Amount: 40 mg. FISH OIL 1,000 mg Cap Generic drug:  omega-3 fatty acids-vitamin e Take 1 Cap by mouth. HYDROcodone-acetaminophen  mg tablet Commonly known as:  Simmie Blonder Take 0.5-1 Tabs by mouth every four (4) hours as needed for Pain (chronic) for up to 30 days. Indications: Pain Start taking on:  8/11/2017  
  
 losartan-hydroCHLOROthiazide 100-25 mg per tablet Commonly known as:  HYZAAR Take 1 Tab by mouth daily. MULTI FOR HIM PO Take  by mouth.  
  
 naloxone 4 mg/actuation Spry 4 mg by Nasal route once as needed (opioid overdose) for up to 1 dose. May substitute 2 mg syringe if insurance requires Safety Needles 18 gauge x 1 1/2\" Ndle Commonly known as:  BD SAFETYGLIDE NEEDLE Use to draw up testosterone and then change needle to give testosterone. Syringe (Disposable) 3 mL Syrg Pt to use with Testosterone injections. TENS Units Arminda Shaver Use as directed  
  
 testosterone cypionate 100 mg/mL injection Commonly known as:  DEPO-TESTOSTERONE  
100 mcg by IntraMUSCular route every ten (10) days. Max Daily Amount: 100 mcg.  
  
 vardenafil 20 mg tablet Commonly known as:  LEVITRA Take 20 mg by mouth as needed. Prescriptions Printed Refills HYDROcodone-acetaminophen (NORCO)  mg tablet 0 Starting on: 8/11/2017 Sig: Take 0.5-1 Tabs by mouth every four (4) hours as needed for Pain (chronic) for up to 30 days. Indications: Pain Class: Print Route: Oral  
  
Prescriptions Sent to Pharmacy Refills naloxone 4 mg/actuation spry 0 Si mg by Nasal route once as needed (opioid overdose) for up to 1 dose. May substitute 2 mg syringe if insurance requires Class: Normal  
 Pharmacy: [facility], [address] [place] [address] #: [phone] Route: Nasal  
  
Follow-up Instructions Return in about 3 months (around 2017). Patient Instructions Plan: 
Continue same medications as prescribed for chronic pain Per the [facility] Medicine recommendation, we will prescribe Naloxone 4 mg nasal spray to use in case of accidental overdose. Do no use Naloxone for any reasons other than opioid toxicity, as its use may precipitate withdrawals. Follow up in 3 months or sooner if needed Regular exercise and attention to emotional health and diet remain the most effective ways to treat chronic pain of all kinds You may contact me with questions or concerns through [address] [facility]! Dear Abraham: Thank you for requesting a [employer] account. Our records indicate that you already have an active [employer] account. You can access your account anytime at [email or web address] Did you know that you can access your hospital and ER discharge instructions at any time in [employer]? You can also review all of your test results from your hospital stay or ER visit. Additional Information If you have questions, please visit the Frequently Asked Questions section of the [employer] website at [email or web address] Remember, [employer] is NOT to be used for urgent needs. For medical emergencies, dial 911. Now available from your iPhone and Android! Please provide this summary of care documentation to your next provider. Your primary care clinician is listed as Jacob Correa. If you have any questions after today's visit, please call [phone].

## 2017-07-17 RX ORDER — DIAZEPAM 10 MG/1
10 TABLET ORAL
Qty: 30 TAB | Refills: 1 | OUTPATIENT
Start: 2017-07-17 | End: 2017-12-12 | Stop reason: SDUPTHER

## 2017-07-17 NOTE — TELEPHONE ENCOUNTER
From: Abraham Avendano  To: Amy Su MD  Sent: 7/15/2017 9:37 PM EDT  Subject: Medication Renewal Request    Original authorizing provider: MD Abraham Durham MATT Mazariegosmartinez would like a refill of the following medications:  diazePAM (VALIUM) 10 mg tablet Amy Su MD]    Preferred pharmacy: [address] [place] [address] Ave:

## 2017-09-28 ENCOUNTER — OFFICE VISIT (OUTPATIENT)
Dept: PAIN MANAGEMENT | Age: 62
End: 2017-09-28

## 2017-09-28 VITALS
SYSTOLIC BLOOD PRESSURE: 146 MMHG | WEIGHT: 191 LBS | BODY MASS INDEX: 27.35 KG/M2 | HEART RATE: 69 BPM | TEMPERATURE: 97 F | DIASTOLIC BLOOD PRESSURE: 84 MMHG | RESPIRATION RATE: 19 BRPM | HEIGHT: 70 IN

## 2017-09-28 DIAGNOSIS — S13.4XXA WHIPLASH INJURY SYNDROME, INITIAL ENCOUNTER: Primary | ICD-10-CM

## 2017-09-28 DIAGNOSIS — M54.16 LUMBAR RADICULITIS: ICD-10-CM

## 2017-09-28 DIAGNOSIS — M47.816 SPONDYLOSIS OF LUMBAR REGION WITHOUT MYELOPATHY OR RADICULOPATHY: ICD-10-CM

## 2017-09-28 DIAGNOSIS — Z98.1 S/P LUMBAR SPINAL FUSION: ICD-10-CM

## 2017-09-28 DIAGNOSIS — Z79.899 ENCOUNTER FOR LONG-TERM (CURRENT) DRUG USE: ICD-10-CM

## 2017-09-28 DIAGNOSIS — Z79.899 ENCOUNTER FOR LONG-TERM (CURRENT) USE OF HIGH-RISK MEDICATION: ICD-10-CM

## 2017-09-28 DIAGNOSIS — G89.4 CHRONIC PAIN SYNDROME: ICD-10-CM

## 2017-09-28 RX ORDER — HYDROCODONE BITARTRATE AND ACETAMINOPHEN 10; 325 MG/1; MG/1
.5-1 TABLET ORAL
Qty: 180 TAB | Refills: 0 | Status: SHIPPED | OUTPATIENT
Start: 2017-11-06 | End: 2017-09-28 | Stop reason: SDUPTHER

## 2017-09-28 RX ORDER — HYDROCODONE BITARTRATE AND ACETAMINOPHEN 10; 325 MG/1; MG/1
.5-1 TABLET ORAL
Qty: 180 TAB | Refills: 0 | Status: SHIPPED | OUTPATIENT
Start: 2017-10-08 | End: 2017-09-28 | Stop reason: SDUPTHER

## 2017-09-28 RX ORDER — HYDROCODONE BITARTRATE AND ACETAMINOPHEN 10; 325 MG/1; MG/1
.5-1 TABLET ORAL
Qty: 180 TAB | Refills: 0 | Status: SHIPPED | OUTPATIENT
Start: 2017-12-05 | End: 2017-12-19 | Stop reason: SDUPTHER
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
## 2017-09-28 NOTE — ACP (ADVANCE CARE PLANNING)
The pt states that he has a POA and a living will. These documents are not found in the pt's chart. He was asked to bring in a copy of these to his next appt so that they can be scanned into his chart. He verbalized understanding and has no questions at this time.
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
## 2017-10-12 RX ORDER — NEEDLES, DISPOSABLE 25GX5/8"
NEEDLE, DISPOSABLE MISCELLANEOUS
Qty: 25 EACH | Refills: 1 | Status: SHIPPED | OUTPATIENT
Start: 2017-10-12 | End: 2021-07-27

## 2017-10-13 ENCOUNTER — HOSPITAL ENCOUNTER (OUTPATIENT)
Dept: PHYSICAL THERAPY | Age: 62
Discharge: HOME | End: 2017-10-13
Payer: COMMERCIAL

## 2017-10-13 PROCEDURE — 97110 THERAPEUTIC EXERCISES: CPT

## 2017-10-13 PROCEDURE — 97161 PT EVAL LOW COMPLEX 20 MIN: CPT

## 2017-10-13 NOTE — PROGRESS NOTES
[facility]  [address] [facility], [address] [address],[place] [address]  [phone] fax    Plan of Care/ Statement of Necessity for Physical Therapy Services    Patient name: Maryuri Ramos Start of Care: 10/13/2017   Referral source: Jose Guadalupe Monk : 1955    Medical Diagnosis: Cervicalgia [M54.2]   Onset Date:17   Treatment Diagnosis: decrease tolerance to ADLS and activities due to Neck pain, LOM   Prior Hospitalization: see medical history Provider#: [id number]   Medications: Verified on Patient summary List    Comorbidities:  high blood pressure and visual impairment , lumbar fusion   Prior Level of Function: I all areas of ADLs and activities, no AD use, needs to tolerate work demands, household chores and yard work. The Plan of Care and following information is based on the information from the initial evaluation. Assessment/ key information:  59 YO male diagnosed as above and with S/S consistent with above diagnosis presents to skilled outpatient PT. CCO R>L neck pain and shoulders pain, pain down arms at night while trying to sleep dependent on side he is sleeping on. Pain also goes from neck down and to in between shoulder blades. Also headaches at times. Pain 8-9/10. Previous Treatment/Compliance: ER, followed up with his MD, medication , pain patch,   Pain 8-9 , FOTO 47, headaches, mild FH and RS, guarded Csp, painful Csp retraction, Csp AROM FF 20, BB 25, SB R/L 5/8    ROT R/L 45/55 , increased Pain eduardo with SB R/L , Tsp NA, TMJ NA, moderate STC R>L UT, trigger poing R UT. Patient demonstrates the potential to make gains with improved ROM, strength, endurance/activity tolerance, functional FOTO survey score and all within a reasonable time frame so as to increase their functional independence with ADLs and activities for carryover to  Improved quality of life, tolerance to household chores and yard work.  Patient requires skilled Physical Therapy so as to monitor their response to and modify their treatment plan accordingly. Patient appears to be an appropriate candidate for skilled outpatient Physical Therapy.     Evaluation Complexity History MEDIUM  Complexity : 1-2 comorbidities / personal factors will impact the outcome/ POC ; Examination HIGH Complexity : 4+ Standardized tests and measures addressing body structure, function, activity limitation and / or participation in recreation  ;Presentation LOW Complexity : Stable, uncomplicated  ;Clinical Decision Making MEDIUM Complexity : FOTO score of 26-74  Overall Complexity Rating: LOW   Problem List: pain affecting function, decrease ROM, decrease strength, impaired gait/ balance, decrease ADL/ functional abilitiies, decrease activity tolerance, decrease flexibility/ joint mobility and other FOTO 47   Treatment Plan may include any combination of the following: Therapeutic exercise, Therapeutic activities, Neuromuscular re-education, Physical agent/modality, Manual therapy and Patient education  Patient / Family readiness to learn indicated by: asking questions, trying to perform skills and interest  Persons(s) to be included in education: patient (P)  Barriers to Learning/Limitations: None  Patient Goal (s):  heal and for the pain to go away  Patient Self Reported Health Status: good  Rehabilitation Potential: good    Short Term Goals: To be accomplished in 5 treatments:   1 patient will have established and be independent with HEP for progression of skilled PT program   EVAL issued   CURRENT   2 patient will have pain 6/10 to aid with increase tolerance to ADLS and activities   EVAL 8-9   CURRENT   3 patient will have FOTO improved to 56 to aid with increase tolerance to ADLS and work demands   EVAL 47   CURRENT    Long Term Goals:  To be accomplished in 10 treatments:   1 patient will have pain 2/10 to aid with increase tolerance to ADLS and activities   EVAL 8-9   CURRENT   2 patient will have FOTO improved to 65 to aid with increase tolerance to ADLS and work demands   EVAL 47   CURRENT   3 patient will report overall 50% improvement to aid with increase tolerance to his work demands    EVAL increased pain with sitting   CURRENT   4 patient will have B SB 15 -20 degrees to aid with increase tolerance to ADLS   EVAL SB R/L 5/8   CURRENT    Frequency / Duration: Patient to be seen 2-3 times per week for 10 treatments. Patient/ Caregiver education and instruction: Diagnosis, prognosis, self care, activity modification and exercises   [x]  Plan of care has been reviewed with WARD Ellis, PT 10/13/2017 11:24 AM  ________________________________________________________________________    I certify that the above Therapy Services are being furnished while the patient is under my care. I agree with the treatment plan and certify that this therapy is necessary.     Physician's Signature:____________________  Date:____________Time: _________    Please sign and return to [facility] [address] [place] [address], [address] [place] [address],[place] [address]  [phone] fax
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
## 2017-10-18 ENCOUNTER — HOSPITAL ENCOUNTER (OUTPATIENT)
Dept: PHYSICAL THERAPY | Age: 62
Discharge: HOME | End: 2017-10-18
Payer: COMMERCIAL

## 2017-10-18 PROCEDURE — 97035 APP MDLTY 1+ULTRASOUND EA 15: CPT

## 2017-10-18 PROCEDURE — 97140 MANUAL THERAPY 1/> REGIONS: CPT

## 2017-10-18 PROCEDURE — 97110 THERAPEUTIC EXERCISES: CPT

## 2017-10-18 NOTE — PROGRESS NOTES
PT DAILY TREATMENT NOTE     Patient Name: Ros Mcqueen  Date:10/18/2017  : 1955  [x]  Patient  Verified  Payor: Davidson [employer]ombs / Plan:  [facility] / Product Type: PPO /    In time:1059  Out time:1202  Total Treatment Time (min): 62  Visit #: 2 of 10    Treatment Area: Cervicalgia [M54.2]    SUBJECTIVE  Pain Level (0-10 scale): 4  Any medication changes, allergies to medications, adverse drug reactions, diagnosis change, or new procedure performed?: [x] No    [] Yes (see summary sheet for update)  Subjective functional status/changes:   [] No changes reported  It is feeling a little better. Just when I think it is doing better it will tweak me and let me know it is still there.     OBJECTIVE    Modality rationale: decrease inflammation, decrease pain and increase tissue extensibility to improve the patients ability to aid with increase tolerance to ADLS and activities   Min Type Additional Details    [] Estim:  []Unatt       []IFC  []Premod                        []Other:  []w/ice   []w/heat  Position:  Location:    [] Estim: []Att    []TENS instruct  []NMES                    []Other:  []w/US   []w/ice   []w/heat  Position:  Location:    []  Traction: [] Cervical       []Lumbar                       [] Prone          []Supine                       []Intermittent   []Continuous Lbs:  [] before manual  [] after manual   8 [x]  Ultrasound: [x]Continuous   [] Pulsed                           [x]1MHz   []3MHz W/cm2:1.3  Location:B UT/LI/Csp    []  Iontophoresis with dexamethasone         Location: [] Take home patch   [] In clinic   10 [x]  Ice post    []  heat  []  Ice massage  []  Laser   []  Anodyne Position:reclined  Location:Csp    []  Laser with stim  []  Other:  Position:  Location:    []  Vasopneumatic Device Pressure:       [] lo [] med [] hi   Temperature: [] lo [] med [] hi   [] Skin assessment post-treatment:  []intact []redness- no adverse reaction    []redness  adverse reaction: min []Eval                  []Re-Eval       30 min Therapeutic Exercise:  [x] See flow sheet :   Rationale: increase ROM, increase strength and improve coordination to improve the patients ability to aid with increase tolerance to ADLS and activities     min Therapeutic Activity:  []  See flow sheet :   Rationale:   to improve the patients ability to       min Neuromuscular Re-education:  []  See flow sheet :   Rationale:   to improve the patients ability to     10 min Manual Therapy:  STM DTM TPR R>L UT/LI/Csp   Rationale: decrease pain, increase ROM, increase tissue extensibility and decrease trigger points to aid with increase tolerance to ADLs and activities     min Gait Training:  ___ feet with ___ device on level surfaces with ___ level of assist   Rationale: With   [] TE   [] TA   [] neuro   [] other: Patient Education: [x] Review HEP    [] Progressed/Changed HEP based on:   [] positioning   [] body mechanics   [] transfers   [] heat/ice application    [] other:      Other Objective/Functional Measures: VC exercises and tech. Pain Level (0-10 scale) post treatment: <4    ASSESSMENT/Changes in Function: first full day back and tolerated well. Patient will continue to benefit from skilled PT services to modify and progress therapeutic interventions, address functional mobility deficits, address ROM deficits, address strength deficits, analyze and address soft tissue restrictions, analyze and cue movement patterns, analyze and modify body mechanics/ergonomics, assess and modify postural abnormalities and instruct in home and community integration to attain remaining goals. [x]  See Plan of Care  []  See progress note/recertification  []  See Discharge Summary         Progress towards goals / Updated goals:   Short Term Goals:  To be accomplished in 5 treatments:                         1 patient will have established and be independent with HEP for progression of skilled PT program EVAL issued                         CURRENT   Progressing, 10/18/17                         2 patient will have pain 6/10 to aid with increase tolerance to ADLS and activities                         EVAL 8-9                         CURRENT   4 10/18/17                         3 patient will have FOTO improved to 56 to aid with increase tolerance to ADLS and work demands                         [facility] Term Goals:  To be accomplished in 10 treatments:                         1 patient will have pain 2/10 to aid with increase tolerance to ADLS and activities                         EVAL 8-9                         CURRENT 4 10/18/17                         2 patient will have FOTO improved to 65 to aid with increase tolerance to ADLS and work demands                         EVAL 47                         CURRENT                         3 patient will report overall 50% improvement to aid with increase tolerance to his work demands                          EVAL increased pain with sitting                         CURRENT                         4 patient will have B SB 15 -20 degrees to aid with increase tolerance to ADLS                         EVAL SB R/L 5/8                         CURRENT    PLAN  [x]  Upgrade activities as tolerated     [x]  Continue plan of care  []  Update interventions per flow sheet       []  Discharge due to:_  []  Other:_      Yuliet Bahena, PT 10/18/2017  11:07 AM    Future Appointments  Date Time Provider [place]   10/24/2017 10:30 AM Keke Bhakta PTA [facility]   10/27/2017 11:30 AM Yuliet Bahena PT [facility]   10/30/2017 1:00 PM Cosmo Scheuermann [facility]   11/6/2017 11:00 AM Nam Burt PT [facility]   11/8/2017 9:30 AM [facility]   11/13/2017 9:30 AM Nam Burt PT [facility]   11/15/2017 9:30 AM Yuliet Bahena PT [facility]   11/20/2017 9:30 AM Nam Burt PT [facility]   11/22/2017 9:30 AM Griffin Espino PT [facility]   12/19/2017 8:00 AM NICOLE Joel

## 2017-10-23 ENCOUNTER — TELEPHONE (OUTPATIENT)
Dept: INTERNAL MEDICINE CLINIC | Age: 62
End: 2017-10-23

## 2017-10-23 RX ORDER — TESTOSTERONE CYPIONATE 100 MG/ML
INJECTION, SOLUTION INTRAMUSCULAR
Qty: 10 VIAL | Refills: 5 | Status: SHIPPED | OUTPATIENT
Start: 2017-10-23 | End: 2018-04-27 | Stop reason: SDUPTHER

## 2017-10-24 ENCOUNTER — HOSPITAL ENCOUNTER (OUTPATIENT)
Dept: PHYSICAL THERAPY | Age: 62
Discharge: HOME | End: 2017-10-24
Payer: COMMERCIAL

## 2017-10-24 PROCEDURE — 97035 APP MDLTY 1+ULTRASOUND EA 15: CPT

## 2017-10-24 PROCEDURE — 97140 MANUAL THERAPY 1/> REGIONS: CPT

## 2017-10-24 PROCEDURE — 97110 THERAPEUTIC EXERCISES: CPT

## 2017-10-24 NOTE — PROGRESS NOTES
PT DAILY TREATMENT NOTE - [facility]     Patient Name: Ning Schulz  Date:10/24/2017  : 1955  [x]  Patient  Verified  Payor: BLUE CROSS / Plan: [facility] [place] / Product Type: PPO /    In time:10:39  Out time:11:29  Total Treatment Time (min): 36  Visit #: 3 of 10    Treatment Area: Cervicalgia [M54.2]    SUBJECTIVE  Pain Level (0-10 scale): 1-2  Any medication changes, allergies to medications, adverse drug reactions, diagnosis change, or new procedure performed?: [x] No    [] Yes (see summary sheet for update)  Subjective functional status/changes:   [] No changes reported  Its  Still there .     OBJECTIVE    Modality rationale: decrease edema, decrease inflammation, decrease pain and increase tissue extensibility to improve the patients ability to perform ADL    Min Type Additional Details    [] Estim:  []Unatt       []IFC  []Premod                        []Other:  []w/ice   []w/heat  Position:  Location:    [] Estim: []Att    []TENS instruct  []NMES                    []Other:  []w/US   []w/ice   []w/heat  Position:  Location:    []  Traction: [] Cervical       []Lumbar                       [] Prone          []Supine                       []Intermittent   []Continuous Lbs:  [] before manual  [] after manual   8 [x]  Ultrasound: [x]Continuous   [] Pulsed                           [x]1MHz   []3MHz W/cm2: 1.5  Location:(B) UT    []  Iontophoresis with dexamethasone         Location: [] Take home patch   [] In clinic    []  Ice     []  heat  []  Ice massage  []  Laser   []  Anodyne Position:  Location:    []  Laser with stim  []  Other:  Position:  Location:    []  Vasopneumatic Device Pressure:       [] lo [] med [] hi   Temperature: [] lo [] med [] hi   [x] Skin assessment post-treatment:  [x]intact []redness- no adverse reaction    []redness  adverse reaction:      min []Eval                  []Re-Eval       20 min Therapeutic Exercise:  [x] See flow sheet :   Rationale: increase ROM and increase strength to improve the patients ability to perform ADL      min Therapeutic Activity:  []  See flow sheet :   Rationale:   to improve the patients ability to       min Neuromuscular Re-education:  []  See flow sheet :   Rationale:   to improve the patients ability to    8 min Manual Therapy:  STM/DTM (B) UT   Rationale: decrease pain, increase ROM, increase tissue extensibility and decrease edema  to perform ADL      min Gait Training:  ___ feet with ___ device on level surfaces with ___ level of assist   Rationale: With   [x] TE   [] TA   [] neuro   [] other: Patient Education: [x] Review HEP    [] Progressed/Changed HEP based on:   [] positioning   [] body mechanics   [] transfers   [] heat/ice application    [] other:      Other Objective/Functional Measures:  Tightness  (B) UT    Pain Level (0-10 scale) post treatment: 0    ASSESSMENT/Changes in Function: Discussed  With pt on performing  Stretches  2  X day. Patient will continue to benefit from skilled PT services to address functional mobility deficits, address ROM deficits, address strength deficits, analyze and address soft tissue restrictions and analyze and cue movement patterns to attain remaining goals.      [x]  See Plan of Care  []  See progress note/recertification  []  See Discharge Summary         Progress towards goals / Updated goals:  Short Term Goals: To be accomplished in 5 treatments:                         0 patient will have established and be independent with HEP for progression of skilled PT program                         EVAL issued                         [id number]   Progressing, 10/18/17                         2 patient will have pain 6/10 to aid with increase tolerance to ADLS and activities                         EVAL 8-9                         CURRENT   4 10/18/17                         3 patient will have FOTO improved to 56 to aid with increase tolerance to ADLS and work demands                         EVAL 52                         CURRENT      Long Term Goals: To be accomplished in 10 treatments:                         8 patient will have pain 2/10 to aid with increase tolerance to ADLS and activities                         EVAL 8-9                         CURRENT 4 10/18/17                         2 patient will have FOTO improved to 65 to aid with increase tolerance to ADLS and work demands                         EVAL [id number] patient will report overall 50% improvement to aid with increase tolerance to his work demands                          EVAL increased pain with sitting                         CURRENT                         4 patient will have B SB 15 -20 degrees to aid with increase tolerance to ADLS                         EVAL SB R/L 5/8                         CURRENT    PLAN  []  Upgrade activities as tolerated     [x]  Continue plan of care  []  Update interventions per flow sheet       []  Discharge due to:_  []  Other:_      Keke Bhakta PTA 10/24/2017  11:03 AM    Future Appointments  Date Time Provider [place]   10/27/2017 11:30 AM Sanaz Hammer, PT [facility]   10/30/2017 1:00 PM Yolande Steward [facility]   11/6/2017 11:00 AM Sheron Serna, PT [facility]   11/8/2017 9:30 AM [facility]   11/13/2017 9:30 AM Sheron Serna, PT [facility]   11/15/2017 9:30 AM Sanaz Hammer PT [facility]   11/20/2017 9:30 AM Sheron Serna, PT [facility]   11/22/2017 9:30 AM Sanaz Hammer PT [facility]   12/19/2017 8:00 AM NICOLE Garduno

## 2017-10-27 ENCOUNTER — HOSPITAL ENCOUNTER (OUTPATIENT)
Dept: PHYSICAL THERAPY | Age: 62
Discharge: HOME | End: 2017-10-27
Payer: COMMERCIAL

## 2017-10-27 PROCEDURE — 97035 APP MDLTY 1+ULTRASOUND EA 15: CPT

## 2017-10-27 PROCEDURE — 97110 THERAPEUTIC EXERCISES: CPT

## 2017-10-27 PROCEDURE — 97140 MANUAL THERAPY 1/> REGIONS: CPT

## 2017-10-27 NOTE — PROGRESS NOTES
PT DAILY TREATMENT NOTE 12    Patient Name: Ning Schulz  Date:10/27/2017  : 1955  [x]  Patient  Verified  Payor: BLUE CROSS / Plan: [facility] / Product Type: PPO /    In time:1132  Out time:1225  Total Treatment Time (min):51  Visit #: 4 of 10    Treatment Area: Cervicalgia [M54.2]    SUBJECTIVE  Pain Level (0-10 scale): 2-3 neck   Any medication changes, allergies to medications, adverse drug reactions, diagnosis change, or new procedure performed?: [x] No    [] Yes (see summary sheet for update)  Subjective functional status/changes:   [] No changes reported  I have eye surgery next week.      OBJECTIVE    Modality rationale: decrease inflammation, decrease pain and increase tissue extensibility to improve the patients ability to aid with increase tolerance to ADLs and activities   Min Type Additional Details    [] Estim:  []Unatt       []IFC  []Premod                        []Other:  []w/ice   []w/heat  Position:  Location:    [] Estim: []Att    []TENS instruct  []NMES                    []Other:  []w/US   []w/ice   []w/heat  Position:  Location:    []  Traction: [] Cervical       []Lumbar                       [] Prone          []Supine                       []Intermittent   []Continuous Lbs:  [] before manual  [] after manual   8 [x]  Ultrasound: [x]Continuous   [] Pulsed                           [x]1MHz   []3MHz W/cm2:1.3  Location:B UT    []  Iontophoresis with dexamethasone         Location: [] Take home patch   [] In clinic    []  Ice     []  heat  []  Ice massage  []  Laser   []  Anodyne Position:  Location:    []  Laser with stim  []  Other:  Position:  Location:    []  Vasopneumatic Device Pressure:       [] lo [] med [] hi   Temperature: [] lo [] med [] hi   [] Skin assessment post-treatment:  []intact []redness- no adverse reaction    []redness  adverse reaction:       min []Eval                  []Re-Eval        30 min Therapeutic Exercise:  [x] See flow sheet : Rationale: increase ROM, increase strength and improve coordination to improve the patients ability to aid with increase tolerance to ADLS and activities     min Therapeutic Activity:  []  See flow sheet :   Rationale:   to improve the patients ability to       min Neuromuscular Re-education:  []  See flow sheet :   Rationale:   to improve the patients ability to     13 min Manual Therapy:  STM DTM TPR B UT/LI Csp paraspinals   Rationale: decrease pain, increase ROM, increase tissue extensibility and decrease trigger points to aid with increase tolerance to ADLs and activiites     min Gait Training:  ___ feet with ___ device on level surfaces with ___ level of assist   Rationale: With   [] TE   [] TA   [] neuro   [] other: Patient Education: [x] Review HEP    [] Progressed/Changed HEP based on:   [] positioning   [] body mechanics   [] transfers   [] heat/ice application    [] other:      Other Objective/Functional Measures: VC exercises and technique    SB R/L 22/20    Pain Level (0-10 scale) post treatment: 2    ASSESSMENT/Changes in Function:     Patient will continue to benefit from skilled PT services to modify and progress therapeutic interventions, address functional mobility deficits, address ROM deficits, address strength deficits, analyze and address soft tissue restrictions, analyze and cue movement patterns, analyze and modify body mechanics/ergonomics, assess and modify postural abnormalities and instruct in home and community integration to attain remaining goals.      [x]  See Plan of Care  []  See progress note/recertification  []  See Discharge Summary         Progress towards goals / Updated goals:  Short Term Goals: To be accomplished in 5 treatments:                         8 patient will have established and be independent with HEP for progression of skilled PT program                         PRINCE DOWNEY   Progressing, 10/18/17 met 10/27/17                         2 patient will have pain 6/10 to aid with increase tolerance to ADLS and activities                         EVAL 8-9                         CURRENT   4 10/18/17    2-3 10/27/17                         3 patient will have FOTO improved to 56 to aid with increase tolerance to ADLS and work demands                         EVAL 47                         CURRENT recheck next session [id number] be accomplished in 10 treatments:                         1 patient will have pain 2/10 to aid with increase tolerance to ADLS and activities                         EVAL 8-9                         CURRENT 4 10/18/17   2-3 10/27/17                         2 patient will have FOTO improved to 65 to aid with increase tolerance to ADLS and work demands                         EVAL 47                         CURRENT recheck next session   [phone] patient will report overall 50% improvement to aid with increase tolerance to his work demands                          EVAL increased pain with sitting                         CURRENT                         4 patient will have B SB 15 -20 degrees to aid with increase tolerance to ADLS                         EVAL SB R/L 5/8                         CURRENT  SB R/L 22/20  10/27/17       PLAN  [x]  Upgrade activities as tolerated     [x]  Continue plan of care  []  Update interventions per flow sheet       []  Discharge due to:_  []  Other:_      Sharyle Freshwater, PT 10/27/2017  11:42 AM    Future Appointments  Date Time Provider [place]   10/30/2017 1:00 PM [address] [facility]   11/6/2017 11:00 AM Matt Calrk, PT [facility]   11/8/2017 9:30 AM Keke Bhakta, PTA [facility]   11/13/2017 9:30 AM Matt Clark PT [facility]   11/15/2017 9:30 AM Sharyle Freshwater, PT [facility]   11/20/2017 9:30 AM Matt Clark PT [facility]   11/22/2017 9:30 AM Sharyle Freshwater, PT [facility]   12/19/2017 8:00 AM Erin Bello [place], [place]

## 2017-10-30 ENCOUNTER — HOSPITAL ENCOUNTER (OUTPATIENT)
Dept: PHYSICAL THERAPY | Age: 62
Discharge: HOME | End: 2017-10-30
Payer: COMMERCIAL

## 2017-10-30 PROCEDURE — 97032 APPL MODALITY 1+ESTIM EA 15: CPT

## 2017-10-30 PROCEDURE — 97110 THERAPEUTIC EXERCISES: CPT

## 2017-10-30 PROCEDURE — 97140 MANUAL THERAPY 1/> REGIONS: CPT

## 2017-10-30 NOTE — PROGRESS NOTES
PT DAILY TREATMENT NOTE 3-16    Patient Name: Douglas Hutson  Date:10/30/2017  : 1955  [x]  Patient  Verified  Payor: BLUE CROSS / Plan: [facility] / Product Type: PPO /    In time:1:00  Out time:139  Total Treatment Time (min): 39  Visit #: 5 of 10    Treatment Area: Cervicalgia [M54.2]    SUBJECTIVE  Pain Level (0-10 scale): 1  Any medication changes, allergies to medications, adverse drug reactions, diagnosis change, or new procedure performed?: [x] No    [] Yes (see summary sheet for update)  Subjective functional status/changes:   [] No changes reported  \"It is getting better. \"    OBJECTIVE  Modality rationale: decrease pain to improve the patients ability to decrease trigger points   Min Type Additional Details    [] Estim:  []Unatt       []IFC  []Premod                        []Other:  []w/ice   []w/heat  Position:  Location:   8 [x] Estim: [x]Att    []TENS instruct  []NMES                    []Other:  [x]w/US   []w/ice   []w/heat  Position: seated  Location: right UT    []  Traction: [] Cervical       []Lumbar                       [] Prone          []Supine                       []Intermittent   []Continuous Lbs:  [] before manual  [] after manual    []  Ultrasound: []Continuous   [] Pulsed                           []1MHz   []3MHz Location:  W/cm2:    []  Iontophoresis with dexamethasone         Location: [] Take home patch   [] In clinic    []  Ice     []  heat  []  Ice massage  []  Laser   []  Anodyne Position:  Location:    []  Laser with stim  []  Other: Position:  Location:    []  Vasopneumatic Device Pressure:       [] lo [] med [] hi   Temperature: [] lo [] med [] hi   [x] Skin assessment post-treatment:  [x]intact []redness- no adverse reaction    []redness  adverse reaction:     23 min Therapeutic Exercise:  [x] See flow sheet :   Rationale: increase ROM, increase strength and improve coordination to improve the patients ability to increase ease with ADLs    8 min Manual Therapy:  DTM/TPR to bilateral UT/LS. SOR   Rationale: decrease pain, increase ROM, increase tissue extensibility and decrease trigger points to ease ADL tolerance           With   [] TE   [] TA   [] neuro   [] other: Patient Education: [x] Review HEP    [] Progressed/Changed HEP based on:   [] positioning   [] body mechanics   [] transfers   [] heat/ice application    [] other:      Other Objective/Functional Measures: FOTO: 46     Noted multiple trigger points along right UT    Pain Level (0-10 scale) post treatment: 3-4/10    ASSESSMENT/Changes in Function:   Initiated combo today due to patient presents with multiple trigger points along right UT. Cues throughout to avoid UT hike and to activate postural muscles. Will continue with trail of combo to decrease trigger points. Patient will continue to benefit from skilled PT services to modify and progress therapeutic interventions, address functional mobility deficits, address ROM deficits, address strength deficits, analyze and address soft tissue restrictions, analyze and cue movement patterns, analyze and modify body mechanics/ergonomics and assess and modify postural abnormalities to attain remaining goals.      []  See Plan of Care  []  See progress note/recertification  []  See Discharge Summary         Progress towards goals / Updated goals:  Short Term Goals: To be accomplished in 5 treatments:                         3 patient will have established and be independent with HEP for progression of skilled PT program                         EVAL issued                         OUTNKLF   Progressing, 10/18/17 met 10/27/17                         2 patient will have pain 6/10 to aid with increase tolerance to ADLS and activities                         EVAL 8-9                         CURRENT   4 10/18/17    2-3 10/27/17                         3 patient will have FOTO improved to 56 to aid with increase tolerance to ADLS and work demands                         EVAL 47                         CURRENT-progressing, score to 51 (10/30/2017)  Long Term Goals: To be accomplished in 10 treatments:                         9 patient will have pain 2/10 to aid with increase tolerance to ADLS and activities                         EVAL 8-9                         CURRENT 4 10/18/17   2-3 10/27/17                         2 patient will have FOTO improved to 65 to aid with increase tolerance to ADLS and work demands                         EVAL 47                         CURRENT recheck next session   [phone] patient will report overall 50% improvement to aid with increase tolerance to his work demands                          EVAL increased pain with sitting                         CURRENT                         4 patient will have B SB 15 -20 degrees to aid with increase tolerance to ADLS                         EVAL SB R/L 5/8                         CURRENT  SB R/L 22/20  10/27/17       PLAN  []  Upgrade activities as tolerated     [x]  Continue plan of care  []  Update interventions per flow sheet       []  Discharge due to:_  []  Other:_      Emmanuelle Doing 10/30/2017  12:57 PM    Future Appointments  Date Time Provider [place]   10/30/2017 1:00 PM [address] [facility]   11/6/2017 11:00 AM Yonny Lackey, PT [facility]   11/8/2017 9:30 AM Keke Bhakta, PTA [facility]   11/13/2017 9:30 AM Yonny Lackey, PT [facility]   11/15/2017 9:30 AM Carson Ellis, PT [facility]   11/20/2017 9:30 AM Yonny Lackey, PT [facility]   11/22/2017 9:30 AM Carson Ellis, PT [facility]   12/19/2017 8:00 AM NICOLE Martinez [facility] [address]

## 2017-11-04 DIAGNOSIS — I10 ESSENTIAL HYPERTENSION: ICD-10-CM

## 2017-11-05 RX ORDER — LOSARTAN POTASSIUM AND HYDROCHLOROTHIAZIDE 25; 100 MG/1; MG/1
TABLET ORAL
Qty: 90 TAB | Refills: 3 | Status: SHIPPED | OUTPATIENT
Start: 2017-11-05 | End: 2018-10-21 | Stop reason: SDUPTHER

## 2017-11-06 ENCOUNTER — HOSPITAL ENCOUNTER (OUTPATIENT)
Dept: PHYSICAL THERAPY | Age: 62
Discharge: HOME | End: 2017-11-06
Payer: COMMERCIAL

## 2017-11-06 PROCEDURE — 97140 MANUAL THERAPY 1/> REGIONS: CPT

## 2017-11-06 PROCEDURE — 97110 THERAPEUTIC EXERCISES: CPT

## 2017-11-06 PROCEDURE — 97012 MECHANICAL TRACTION THERAPY: CPT

## 2017-11-06 NOTE — PROGRESS NOTES
PT DAILY TREATMENT NOTE 3-16    Patient Name: Sujata Keller  Date:2017  : 1955  [x]  Patient  Verified  Payor: Maricarmen Fajardo / Plan:  [facility] / Product Type: PPO /    In time:1100  Out time:1212  Total Treatment Time (min): 58  Visit #: 6 of 10    Treatment Area: Cervicalgia [M54.2]    SUBJECTIVE  Pain Level (0-10 scale): 1  Any medication changes, allergies to medications, adverse drug reactions, diagnosis change, or new procedure performed?: [x] No    [] Yes (see summary sheet for update)  Subjective functional status/changes:   [] No changes reported  Pain in neck and (B) Shoulder during and after having eye surgery on 10/31/17.   Increased stiffness    OBJECTIVE  Modality rationale: decrease pain and increase tissue extensibility to improve the patients ability to perform increased ADL   Min Type Additional Details    [] Estim:  []Unatt       []IFC  []Premod                        []Other:  []w/ice   []w/heat  Position:  Location:    [] Estim: []Att    []TENS instruct  []NMES                    []Other:  []w/US   []w/ice   []w/heat  Position:  Location:   10 [x]  Traction: [x] Cervical       []Lumbar                       [] Prone          []Supine                       []Intermittent   []Continuous Lbs: 26  [] before manual  [x] after manual    []  Ultrasound: []Continuous   [] Pulsed                           []1MHz   []3MHz Location:  W/cm2:    []  Iontophoresis with dexamethasone         Location: [] Take home patch   [] In clinic    []  Ice     []  heat  []  Ice massage  []  Laser   []  Anodyne Position:  Location:    []  Laser with stim  []  Other: Position:  Location:    []  Vasopneumatic Device Pressure:       [] lo [] med [] hi   Temperature: [] lo [] med [] hi   [x] Skin assessment post-treatment:  [x]intact []redness- no adverse reaction    []redness  adverse reaction:     38 min Therapeutic Exercise:  [x] See flow sheet :   Rationale: increase ROM, increase strength and improve coordination to improve the patients ability to perform increased ADL  10 min Manual Therapy:  Cx Traction, Cx SB/Rot mobs, Cx CT Jct mobs for Rot, Tx Ext/SB  mobs   Rationale: decrease pain, increase ROM and increase tissue extensibility to perform increased ADL         With   [x] TE   [] TA   [] neuro   [] other: Patient Education: [x] Review HEP    [] Progressed/Changed HEP based on:   [] positioning   [] body mechanics   [] transfers   [] heat/ice application    [] other:      Other Objective/Functional Measures:   - AROM Cx Rot (R) 57 (L) 43  - Dec Mobility (B) SC joint  - Increased Cx mobility after treatment Cx rot (R) 66 (L) 62  - Feels looser after treatment         Pain Level (0-10 scale) post treatment: 2    ASSESSMENT/Changes in Function:      Patient will continue to benefit from skilled PT services to modify and progress therapeutic interventions, address functional mobility deficits, address ROM deficits, address strength deficits, analyze and address soft tissue restrictions and analyze and cue movement patterns to attain remaining goals.      []  See Plan of Care  []  See progress note/recertification  []  See Discharge Summary         Progress towards goals / Updated goals:  Short Term Goals: To be accomplished in 5 treatments:                         5 patient will have established and be independent with HEP for progression of skilled PT program                         EVAL issued                         [id number]   Progressing, 10/18/17 met 10/27/17                         2 patient will have pain 6/10 to aid with increase tolerance to ADLS and activities                         EVAL 8-9                         CURRENT   4 10/18/17    2-3 10/27/17                         3 patient will have FOTO improved to 56 to aid with increase tolerance to ADLS and work demands                         EVAL 47                         CURRENT-progressing, score to 51 (10/30/2017)  [address] [facility] be accomplished in 10 treatments:                         2 patient will have pain 2/10 to aid with increase tolerance to ADLS and activities                         EVAL 8-9                         CURRENT Met 1/10 11/6/17                         2 patient will have FOTO improved to 65 to aid with increase tolerance to ADLS and work demands                         EVAL 47                         CURRENT recheck next session                          3 patient will report overall 50% improvement to aid with increase tolerance to his work demands                          EVAL increased pain with sitting                         CURRENT                         4 patient will have B SB 15 -20 degrees to aid with increase tolerance to ADLS                         EVAL SB R/L 5/8                         CURRENT  SB R/L 22/20  10/27/17  PLAN  [x]  Upgrade activities as tolerated     [x]  Continue plan of care  []  Update interventions per flow sheet       []  Discharge due to:_  []  Other:_      Nam Burt, RADHA 11/6/2017  11:15 AM    Future Appointments  Date Time Provider [place]   11/8/2017 9:30 AM Keke Scott PTA [facility]   11/13/2017 2:30 PM Keke Bhakta PTA [facility]   11/15/2017 9:30 AM Yuliet Bahena, PT [facility]   11/20/2017 9:30 AM Nam Burt PT [facility]   11/22/2017 9:30 AM Yuliet Bahena PT [facility]   12/1/2017 9:40 AM Moiz Fisher MD [facility]   12/19/2017 8:00 AM NICOLE Nicole [facility] [address]

## 2017-11-08 ENCOUNTER — HOSPITAL ENCOUNTER (OUTPATIENT)
Dept: PHYSICAL THERAPY | Age: 62
Discharge: HOME | End: 2017-11-08
Payer: COMMERCIAL

## 2017-11-08 PROCEDURE — 97012 MECHANICAL TRACTION THERAPY: CPT

## 2017-11-08 PROCEDURE — 97110 THERAPEUTIC EXERCISES: CPT

## 2017-11-08 NOTE — PROGRESS NOTES
PT DAILY TREATMENT NOTE - [facility]     Patient Name: Reinaldo Sandoval  Date:2017  : 1955  [x]  Patient  Verified  Payor: BLUE CROSS / Plan: [facility] / Product Type: PPO /    In time: 9:38  Out time:10:17  Total Treatment Time (min): 33  Visit #: 7 of 10    Treatment Area: Cervicalgia [M54.2]    SUBJECTIVE  Pain Level (0-10 scale): 0  Any medication changes, allergies to medications, adverse drug reactions, diagnosis change, or new procedure performed?: [x] No    [] Yes (see summary sheet for update)  Subjective functional status/changes:   [] No changes reported  Feeling better.     OBJECTIVE    Modality rationale: decrease edema, decrease inflammation, decrease pain and increase tissue extensibility to improve the patients ability to perform ADL    Min Type Additional Details    [] Estim:  []Unatt       []IFC  []Premod                        []Other:  []w/ice   []w/heat  Position:  Location:    [] Estim: []Att    []TENS instruct  []NMES                    []Other:  []w/US   []w/ice   []w/heat  Position:  Location:   10 [x]  Traction: [x] Cervical       []Lumbar                       [] Prone          []Supine                       []Intermittent   []Continuous Lbs: 26#  [] before manual  [] after manual    []  Ultrasound: []Continuous   [] Pulsed                           []1MHz   []3MHz W/cm2:  Location:    []  Iontophoresis with dexamethasone         Location: [] Take home patch   [] In clinic    []  Ice     []  heat  []  Ice massage  []  Laser   []  Anodyne Position:  Location:    []  Laser with stim  []  Other:  Position:  Location:    []  Vasopneumatic Device Pressure:       [] lo [] med [] hi   Temperature: [] lo [] med [] hi   [x] Skin assessment post-treatment:  [x]intact []redness- no adverse reaction    []redness  adverse reaction:      min []Eval                  []Re-Eval       23 min Therapeutic Exercise:  [x] See flow sheet :   Rationale: increase ROM and increase strength to improve the patients ability to perform ADL      min Therapeutic Activity:  []  See flow sheet :   Rationale:   to improve the patients ability to       min Neuromuscular Re-education:  []  See flow sheet :   Rationale:   to improve the patients ability to      min Manual Therapy:    Rationale: decrease pain, increase ROM, increase tissue extensibility and decrease edema  to perform ADL      min Gait Training:  ___ feet with ___ device on level surfaces with ___ level of assist   Rationale: With   [x] TE   [] TA   [] neuro   [] other: Patient Education: [x] Review HEP    [] Progressed/Changed HEP based on:   [] positioning   [] body mechanics   [] transfers   [] heat/ice application    [] other:      Other Objective/Functional Measures:  Responded  Fairly  Well  To each there ex. Pain Level (0-10 scale) post treatment: 1-2    ASSESSMENT/Changes in Function: Benefited  With treatment. Patient will continue to benefit from skilled PT services to address functional mobility deficits, address ROM deficits, address strength deficits and analyze and address soft tissue restrictions to attain remaining goals.      [x]  See Plan of Care  []  See progress note/recertification  []  See Discharge Summary         Progress towards goals / Updated goals:  Short Term Goals: To be accomplished in 5 treatments:                         4 patient will have established and be independent with HEP for progression of skilled PT program                         EVAL issued                         [id number]   Progressing, 10/18/17 met 10/27/17                         2 patient will have pain 6/10 to aid with increase tolerance to ADLS and activities                         EVAL 8-9                         CURRENT   4 10/18/17    2-3 10/27/17                         3 patient will have FOTO improved to 56 to aid with increase tolerance to ADLS and work demands  [employer] 47                         CURRENT-progressing, score to 51 (10/30/2017)  Long Term Goals: To be accomplished in 10 treatments:                         0 patient will have pain 2/10 to aid with increase tolerance to ADLS and activities                         EVAL 8-9                         CURRENT Met 1/10 11/6/17                         2 patient will have FOTO improved to 65 to aid with increase tolerance to ADLS and work demands                         EVAL 47                         CURRENT recheck next session                          3 patient will report overall 50% improvement to aid with increase tolerance to his work demands                          EVAL increased pain with sitting                         CURRENT                         4 patient will have B SB 15 -20 degrees to aid with increase tolerance to ADLS                         EVAL SB R/L 5/8                         CURRENT  SB R/L 22/20  10/27/17    PLAN  []  Upgrade activities as tolerated     []  Continue plan of care  []  Update interventions per flow sheet       []  Discharge due to:_  [x]  Other:_REASSESS ROM NV      Keke Bhakta PTA 11/8/2017  9:59 AM    Future Appointments  Date Time Provider [place]   11/13/2017 2:30 PM Keke Scott PTA [facility]   11/15/2017 9:30 AM Kaylah Tucker, PT [facility]   11/20/2017 9:30 AM Aguila Hawkins, PT [facility]   11/22/2017 9:30 AM Kaylah Tucker, PT [facility]   12/1/2017 9:40 AM Mariluz Kauffman MD [facility]   12/19/2017 8:00 AM NICOLE Long [facility] [address]

## 2017-11-10 ENCOUNTER — DOCUMENTATION ONLY (OUTPATIENT)
Dept: PAIN MANAGEMENT | Age: 62
End: 2017-11-10

## 2017-11-10 NOTE — PROGRESS NOTES
Request for records from [employer] firm and fax request to [employer] to process on 11/10/2017.

## 2017-11-13 ENCOUNTER — HOSPITAL ENCOUNTER (OUTPATIENT)
Dept: PHYSICAL THERAPY | Age: 62
Discharge: HOME | End: 2017-11-13
Payer: COMMERCIAL

## 2017-11-13 PROCEDURE — 97110 THERAPEUTIC EXERCISES: CPT

## 2017-11-13 PROCEDURE — 97012 MECHANICAL TRACTION THERAPY: CPT

## 2017-11-13 NOTE — PROGRESS NOTES
PT DAILY TREATMENT NOTE - [facility]     Patient Name: Ledora Sacks  Date:2017  : 1955  [x]  Patient  Verified  Payor: Dawn Pereira / Plan:  [facility] [employer] / Product Type: PPO /    In time:2:30  Out time:3:14  Total Treatment Time (min): 42  Visit #: 8 of 10    Treatment Area: Cervicalgia [M54.2]    SUBJECTIVE  Pain Level (0-10 scale): 0  Any medication changes, allergies to medications, adverse drug reactions, diagnosis change, or new procedure performed?: [x] No    [] Yes (see summary sheet for update)  Subjective functional status/changes:   [] No changes reported  No pain.     OBJECTIVE    Modality rationale: decrease edema, decrease inflammation, decrease pain and increase tissue extensibility to improve the patients ability to perform  ADL    Min Type Additional Details    [] Estim:  []Unatt       []IFC  []Premod                        []Other:  []w/ice   []w/heat  Position:  Location:    [] Estim: []Att    []TENS instruct  []NMES                    []Other:  []w/US   []w/ice   []w/heat  Position:  Location:   10 [x]  Traction: [x] Cervical       []Lumbar                       [] Prone          [x]Supine                       []Intermittent   [x]Continuous Lbs:26  [] before manual  [] after manual    []  Ultrasound: []Continuous   [] Pulsed                           []1MHz   []3MHz W/cm2:  Location:    []  Iontophoresis with dexamethasone         Location: [] Take home patch   [] In clinic    []  Ice     []  heat  []  Ice massage  []  Laser   []  Anodyne Position:  Location:    []  Laser with stim  []  Other:  Position:  Location:    []  Vasopneumatic Device Pressure:       [] lo [] med [] hi   Temperature: [] lo [] med [] hi   [x] Skin assessment post-treatment:  [x]intact []redness- no adverse reaction    []redness  adverse reaction:      min []Eval                  []Re-Eval       32 min Therapeutic Exercise:  [x] See flow sheet :   Rationale: increase ROM, increase strength and improve coordination to improve the patients ability to perform ADL      min Therapeutic Activity:  []  See flow sheet :   Rationale:   to improve the patients ability to       min Neuromuscular Re-education:  []  See flow sheet :   Rationale:   to improve the patients ability to      min Manual Therapy:     Rationale: decrease pain, increase ROM, increase tissue extensibility and decrease edema  to perform ADL      min Gait Training:  ___ feet with ___ device on level surfaces with ___ level of assist   Rationale: With   [x] TE   [] TA   [] neuro   [] other: Patient Education: [x] Review HEP    [] Progressed/Changed HEP based on:   [] positioning   [] body mechanics   [] transfers   [] heat/ice application    [] other:      Other Objective/Functional Measures:  Responded  Fairly well  To  Each there ex. Pain Level (0-10 scale) post treatment: 0    ASSESSMENT/Changes in Function: Benefited  With treatment. Patient will continue to benefit from skilled PT services to address functional mobility deficits, address ROM deficits, address strength deficits, analyze and address soft tissue restrictions and instruct in home and community integration to attain remaining goals.      [x]  See Plan of Care  []  See progress note/recertification  []  See Discharge Summary         Progress towards goals / Updated goals:  1 patient will have established and be independent with HEP for progression of skilled PT program                         EVAL issued                         [id number]   Progressing, 10/18/17 met 10/27/17                         2 patient will have pain 6/10 to aid with increase tolerance to ADLS and activities                         EVAL 8-9                         CURRENT   4 10/18/17    2-3 10/27/17                         3 patient will have FOTO improved to 56 to aid with increase tolerance to ADLS and work demands                         EVAL 47                         CURRENT-progressing, score to 51 (10/30/2017)  Long Term Goals: To be accomplished in 10 treatments:                         3 patient will have pain 2/10 to aid with increase tolerance to ADLS and activities                         EVAL 8-9                         CURRENT Met 1/10 11/6/17                         2 patient will have FOTO improved to 65 to aid with increase tolerance to ADLS and work demands                         EVAL 47                         CURRENT recheck next session                          3 patient will report overall 50% improvement to aid with increase tolerance to his work demands                          EVAL increased pain with sitting                         CURRENT                         4 patient will have B SB 15 -20 degrees to aid with increase tolerance to ADLS                         EVAL SB R/L 5/8                         CURRENT  SB R/L 22/20  10/27/17       PLAN  []  Upgrade activities as tolerated     [x]  Continue plan of care  []  Update interventions per flow sheet       []  Discharge due to:_  [x]  Other:_  REASSESS ROM NV    Keke Bhakta, PTA 11/13/2017  2:32 PM    Future Appointments  Date Time Provider [place]   11/15/2017 9:30 AM Wing Busch, PT [facility]   11/20/2017 9:30 AM Jeremias Engel PT [facility]   11/22/2017 9:30 AM Wing Busch, PT [facility]   12/1/2017 9:40 AM Dago Bray MD [facility]   12/19/2017 8:00 AM NICOLE Arceo [facility] [address]

## 2017-11-15 ENCOUNTER — HOSPITAL ENCOUNTER (OUTPATIENT)
Dept: PHYSICAL THERAPY | Age: 62
Discharge: HOME | End: 2017-11-15
Payer: COMMERCIAL

## 2017-11-15 PROCEDURE — 97110 THERAPEUTIC EXERCISES: CPT

## 2017-11-15 PROCEDURE — 97012 MECHANICAL TRACTION THERAPY: CPT

## 2017-11-15 NOTE — PROGRESS NOTES
PT DAILY TREATMENT NOTE     Patient Name: Jacquelin Quinones  Date:11/15/2017  : 1955  [x]  Patient  Verified  Payor: BLUE CROSS / Plan: [employer] [facility] / Product Type: PPO /    In time:925  Out time:1005  Total Treatment Time (min): 27  Visit #: 9 of 10    Treatment Area: Cervicalgia [M54.2]    SUBJECTIVE  Pain Level (0-10 scale): 0  Any medication changes, allergies to medications, adverse drug reactions, diagnosis change, or new procedure performed?: [x] No    [] Yes (see summary sheet for update)  Subjective functional status/changes:   [] No changes reported  I have more movement, today is a good day    OBJECTIVE    Modality rationale: increase tissue extensibility to improve the patients ability to aid with increase tolerance to ADLS and activities   Min Type Additional Details    [] Estim:  []Unatt       []IFC  []Premod                        []Other:  []w/ice   []w/heat  Position:  Location:    [] Estim: []Att    []TENS instruct  []NMES                    []Other:  []w/US   []w/ice   []w/heat  Position:  Location:   12 [x]  Traction: [x] Cervical       []Lumbar                       [] Prone          []Supine                       []Intermittent   [x]Continuous Lbs:27  [] before manual  [] after manual    []  Ultrasound: []Continuous   [] Pulsed                           []1MHz   []3MHz W/cm2:  Location:    []  Iontophoresis with dexamethasone         Location: [] Take home patch   [] In clinic    []  Ice     []  heat  []  Ice massage  []  Laser   []  Anodyne Position:  Location:    []  Laser with stim  []  Other:  Position:  Location:    []  Vasopneumatic Device Pressure:       [] lo [] med [] hi   Temperature: [] lo [] med [] hi   [] Skin assessment post-treatment:  []intact []redness- no adverse reaction    []redness  adverse reaction:      min []Eval                  []Re-Eval       15 min Therapeutic Exercise:  [x] See flow sheet :   Rationale: increase ROM, increase strength and improve coordination to improve the patients ability to aid with increase tolerance to ADLs and activities     min Therapeutic Activity:  []  See flow sheet :   Rationale:   to improve the patients ability to       min Neuromuscular Re-education:  []  See flow sheet :   Rationale:   to improve the patients ability to      min Manual Therapy:     Rationale:  to      min Gait Training:  ___ feet with ___ device on level surfaces with ___ level of assist   Rationale: With   [] TE   [] TA   [] neuro   [] other: Patient Education: [x] Review HEP    [] Progressed/Changed HEP based on:   [] positioning   [] body mechanics   [] transfers   [] heat/ice application    [] other:      Other Objective/Functional Measures:VC exercises and tech   Csp AROM  FF  15    BB    35   ROT  R/L  52/56     SB   R/L 20/25    Pain Level (0-10 scale) post treatment: 0    ASSESSMENT/Changes in Function: increased ROM, tolerated well. Patient will continue to benefit from skilled PT services to modify and progress therapeutic interventions, address functional mobility deficits, address ROM deficits, address strength deficits, analyze and address soft tissue restrictions, analyze and cue movement patterns, analyze and modify body mechanics/ergonomics, assess and modify postural abnormalities and instruct in home and community integration to attain remaining goals.      [x]  See Plan of Care  []  See progress note/recertification  []  See Discharge Summary         Progress towards goals / Updated goals:   1 patient will have established and be independent with HEP for progression of skilled PT program                         PRINCE issued                         [id number]   Progressing, 10/18/17 met 10/27/17   11/15/17                         2 patient will have pain 6/10 to aid with increase tolerance to ADLS and activities                         PRINCE 8-9                         CURRENT   4 10/18/17    2-3 10/27/17   0 11/15/17                         3 patient will have FOTO improved to 56 to aid with increase tolerance to ADLS and work demands                         EVAL 47                         CURRENT-progressing, score to 51 (10/30/2017)  [address] be accomplished in 10 treatments:                         3 patient will have pain 2/10 to aid with increase tolerance to ADLS and activities                         EVAL 8-9                         CURRENT Met 1/10 11/6/17  0 11/15/17                         2 patient will have FOTO improved to 65 to aid with increase tolerance to ADLS and work demands                         EVAL 47                         CURRENT recheck next session   [phone] patient will report overall 50% improvement to aid with increase tolerance to his work demands                          EVAL increased pain with sitting                         CURRENT                         4 patient will have B SB 15 -20 degrees to aid with increase tolerance to ADLS                         EVAL SB R/L 5/8                         CURRENT  SB R/L 22/20  10/27/17    Csp AROM  FF  15    BB    35   ROT  R/L  52/56     SB   R/L 20/25    PLAN  [x]  Upgrade activities as tolerated     [x]  Continue plan of care  []  Update interventions per flow sheet       []  Discharge due to:_  []  Other:_      Kaylah Tucker, PT 11/15/2017  9:23 AM    Future Appointments  Date Time Provider [place]   11/15/2017 9:30 AM Kaylah Tucker, PT [facility]   11/20/2017 9:30 AM Aguila Hawkins PT [facility]   11/22/2017 9:30 AM Kaylah Tucker, PT [facility]   12/1/2017 9:40 AM Mariluz Kauffman MD [facility]   12/19/2017 8:00 AM NICOLE Long [facility] [address]

## 2017-11-20 ENCOUNTER — HOSPITAL ENCOUNTER (OUTPATIENT)
Dept: PHYSICAL THERAPY | Age: 62
Discharge: HOME | End: 2017-11-20
Payer: COMMERCIAL

## 2017-11-20 PROCEDURE — 97140 MANUAL THERAPY 1/> REGIONS: CPT

## 2017-11-20 PROCEDURE — 97110 THERAPEUTIC EXERCISES: CPT

## 2017-11-20 NOTE — PROGRESS NOTES
PT DISCHARGE DAILY NOTE AND [id number]    Date:2017  Patient name: Victor M Turcios Start of Care: 10/13/2017   Referral source: Gloria Nava : 1955                          Medical Diagnosis: Cervicalgia [M54.2] Onset Date:17   Treatment Diagnosis: decrease tolerance to ADLS and activities due to Neck pain, LOM   Prior Hospitalization: see medical history Provider#: [id number]   Medications: Verified on Patient summary List    Comorbidities:  high blood pressure and visual impairment , lumbar fusion   Prior Level of Function: I all areas of ADLs and activities, no AD use, needs to tolerate work demands, household chores and yard work.         Visits from [facility]: 10    Missed Visits: 0    Reporting Period : 10/13/17 to 17    [x]  Patient  Verified  Payor: BLUE CROSS / Plan: [facility] / Product Type: PPO /    In EHZE:1834  Out time:1014  Total Treatment Time (min): 42  Visit #: 10 of 10    SUBJECTIVE  Pain Level (0-10 scale): 0  Any medication changes, allergies to medications, adverse drug reactions, diagnosis change, or new procedure performed?: [x] No    [] Yes (see summary sheet for update)  Subjective functional status/changes:   [] No changes reported  Doing good, ready for D/C.   Reports 100% improvement since start of treatment    OBJECTIVE      32 min Therapeutic Exercise:  [] See flow sheet :   Rationale: increase ROM, increase strength and improve coordination to improve the patients ability to tolerate increased activity levels    10 min Manual Therapy:  Cx Traction, Cx Rot/SB mobs (B)   Rationale: decrease pain, increase ROM and increase tissue extensibility to tolerate increased activity levels      With   [x] TE   [] TA   [] neuro   [] other: Patient Education: [x] Review HEP    [] Progressed/Changed HEP based on:   [] positioning   [] body mechanics   [] transfers   [] heat/ice application    [] other:      Other Objective/Functional Measures:   - AROM Cx Rot (R) 65 (L) 54  - Increase AROM (L) Cx Rot 65 after treatment  - FOTO = 82     Pain Level (0-10 scale) post treatment: 0    Summary of Care:  Progress towards goals / Updated goals:   1 patient will have established and be independent with HEP for progression of skilled PT program                         EVAL issued                         [id number]   Progressing, 10/18/17 met 10/27/17   11/15/17                         2 patient will have pain 6/10 to aid with increase tolerance to ADLS and activities                         EVAL 8-9                         CURRENT  Met pain 0/10                         3 patient will have FOTO improved to 56 to aid with increase tolerance to ADLS and work demands                         EVAL 47                         CURRENT-progressing, score to 51 (10/30/2017)  [address] [facility] [address] be accomplished in 10 treatments:                         8 patient will have pain 2/10 to aid with increase tolerance to ADLS and activities                         EVAL 8-9                         CURRENT Met 1/10 11/6/17  0 11/15/17                         2 patient will have FOTO improved to 65 to aid with increase tolerance to ADLS and work demands                         EVAL 47                         CURRENT Met at [phone] patient will report overall 50% improvement to aid with increase tolerance to his work demands                          EVAL increased pain with sitting                         CURRENT Met Pt reports 100% improvement since start of treatment                         4 patient will have B SB 15 -20 degrees to aid with increase tolerance to ADLS                         EVAL SB R/L 5/8                         CURRENT  Met Csp AROM  FF  15    BB    35   ROT  R/L  65/65     SB   R/L 20/25       ASSESSMENT/Changes in Function: Patient has shown good progress with this treatment program. Pain as of last visit was 0/10.  Patient has shown decreased pain and increased strength and mobility. Patient reports 100% improvement with overall involvement. FOTO score is 82. All STG/LTGs achieved as identified above.     Fall Risk Assessment: Patient demonstrates no Fall Risk       Thank you for this referral!      PLAN  [x]Discontinue therapy: [x]Patient has reached or is progressing toward set goals      []Patient is non-compliant or has abdicated      []Due to lack of appreciable progress towards set goals    Lalitha Franco, PT 11/20/2017  9:58 AM

## 2017-11-22 ENCOUNTER — HOSPITAL ENCOUNTER (OUTPATIENT)
Dept: LAB | Age: 62
Discharge: HOME | End: 2017-11-22
Payer: COMMERCIAL

## 2017-11-22 ENCOUNTER — APPOINTMENT (OUTPATIENT)
Dept: PHYSICAL THERAPY | Age: 62
End: 2017-11-22
Payer: COMMERCIAL

## 2017-11-22 DIAGNOSIS — R73.01 IFG (IMPAIRED FASTING GLUCOSE): ICD-10-CM

## 2017-11-22 DIAGNOSIS — E29.1 HYPOGONADISM MALE: ICD-10-CM

## 2017-11-22 DIAGNOSIS — I10 ESSENTIAL HYPERTENSION: ICD-10-CM

## 2017-11-22 LAB
ALBUMIN SERPL-MCNC: 4 G/DL (ref 3.4–5)
ALBUMIN/GLOB SERPL: 1.2 {RATIO} (ref 0.8–1.7)
ALP SERPL-CCNC: 60 U/L (ref 45–117)
ALT SERPL-CCNC: 59 U/L (ref 16–61)
ANION GAP SERPL CALC-SCNC: 7 MMOL/L (ref 3–18)
AST SERPL-CCNC: 27 U/L (ref 15–37)
BILIRUB SERPL-MCNC: 0.5 MG/DL (ref 0.2–1)
BUN SERPL-MCNC: 16 MG/DL (ref 7–18)
BUN/CREAT SERPL: 16 (ref 12–20)
CALCIUM SERPL-MCNC: 8.7 MG/DL (ref 8.5–10.1)
CHLORIDE SERPL-SCNC: 102 MMOL/L (ref 100–108)
CO2 SERPL-SCNC: 31 MMOL/L (ref 21–32)
CREAT SERPL-MCNC: 1 MG/DL (ref 0.6–1.3)
GLOBULIN SER CALC-MCNC: 3.3 G/DL (ref 2–4)
GLUCOSE SERPL-MCNC: 92 MG/DL (ref 74–99)
HBA1C MFR BLD: 5.4 % (ref 4.2–5.6)
POTASSIUM SERPL-SCNC: 4.5 MMOL/L (ref 3.5–5.5)
PROT SERPL-MCNC: 7.3 G/DL (ref 6.4–8.2)
SODIUM SERPL-SCNC: 140 MMOL/L (ref 136–145)

## 2017-11-22 PROCEDURE — 36415 COLL VENOUS BLD VENIPUNCTURE: CPT | Performed by: INTERNAL MEDICINE

## 2017-11-22 PROCEDURE — 80053 COMPREHEN METABOLIC PANEL: CPT | Performed by: INTERNAL MEDICINE

## 2017-11-22 PROCEDURE — 84403 ASSAY OF TOTAL TESTOSTERONE: CPT | Performed by: INTERNAL MEDICINE

## 2017-11-22 PROCEDURE — 83036 HEMOGLOBIN GLYCOSYLATED A1C: CPT | Performed by: INTERNAL MEDICINE

## 2017-11-23 LAB — TESTOST SERPL-MCNC: 884 NG/DL (ref 264–916)

## 2017-11-27 NOTE — PROGRESS NOTES
60 y.o. WHITE OR  male who presents for f/u    He continues to see pain mgmt and the meds continue to allow him to function with adls. He's active but no set exercise routine. No GI or  complaints. The fiber has really helped a lot. Continues on testosterone replacement and he's happy w the results    No polyuria, polydipsia, nocturia, vision change, not checking sugars at this time.  Weight stable as below    Past Medical History:   Diagnosis Date    Chronic back pain     Dr. Saini Counts Dyslipidemia     Erectile dysfunction     Fatty liver     US 2002, serologies negative; fib-4 1.06 from 3/14    FHx: colon cancer     H/O bone scan 3/14    negative outside of djd    HTN (hypertension)     Hypogonadism male     negative pituitary MRI 2013    Internal hemorrhoids 11/15    Dr Miller Double    Lower urinary tract symptoms (LUTS) 5/16    mild    Lumbago     MRI 1/14 and CT 3/14 w lumbar spinal stenosis severe    Lumbar post-laminectomy syndrome     Lumbar spinal stenosis     Prediabetes     2 hr gtt 124 from 10/11    Rosacea     S/P lumbar spinal fusion     Spasm of muscle     Status post lumbar laminectomy     Thoracic or lumbosacral neuritis or radiculitis, unspecified      Past Surgical History:   Procedure Laterality Date    CHEST SURGERY PROCEDURE UNLISTED      resection of a RUL lung lesion for recurring polyps 1980s    HX CATARACT REMOVAL  09/2014    Dr Isabel Santos right     HX COLONOSCOPY      negative Dr. Jennifer Fierro 2005, 2010; Dr Michael James 11/15 hemorrhoids    HX CORNEAL TRANSPLANT  10/31/2017    Dr Carrie Barnes right    [facility]  2012    Dr. Soco Herrera right buttock    HX LUMBAR LAMINECTOMY       Social History     Social History    Marital status:      Spouse name: N/A    Number of children: 3    Years of education: N/A     Occupational History    sup [employer]      Social History Main Topics    Smoking status: Former Smoker     Types: Cigarettes    Smokeless tobacco: Never Used    Alcohol use 0.0 oz/week      Comment: social    Drug use: No    Sexual activity: Not on file     Other Topics Concern    Not on file     Social History Narrative     Allergies   Allergen Reactions    Lisinopril Other (comments)     ED      Current Outpatient Prescriptions   Medication Sig    prednisoLONE acetate (PRED FORTE) 1 % ophthalmic suspension Administer 1 Drop to right eye four (4) times daily.  losartan-hydroCHLOROthiazide (HYZAAR) 100-25 mg per tablet take 1 tablet by mouth once daily    DEPO-TESTOSTERONE 100 mg/mL injection inject 100 milligrams intramuscularly every 10 days    BD REGULAR BEVEL NEEDLES 18 gauge x 1 1/2\" ndle USE TO DRAW UP TESTOSTERONE AND THEN CHANGE NEEDLE TO GIVE TESTOSTERONE    [START ON 12/5/2017] HYDROcodone-acetaminophen (NORCO)  mg tablet Take 0.5-1 Tabs by mouth every four (4) hours as needed for Pain (chronic) for up to 30 days. Indications: Pain    naloxone 4 mg/actuation spry 4 mg by Nasal route once as needed (opioid overdose) for up to 1 dose. May substitute 2 mg syringe if insurance requires    atorvastatin (LIPITOR) 10 mg tablet take 1 tablet by mouth daily    vardenafil (LEVITRA) 20 mg tablet Take 20 mg by mouth as needed.  Syringe, Disposable, 3 mL syrg Pt to use with Testosterone injections.  omega-3 fatty acids-vitamin e (FISH OIL) 1,000 mg Cap Take 1 Cap by mouth.  MULTIVITS/IRON FUM/FA/D3/LYCOP (MULTI FOR HIM PO) Take  by mouth.  diazePAM (VALIUM) 10 mg tablet Take 1 Tab by mouth every six (6) hours as needed for Anxiety. Max Daily Amount: 40 mg.    HYDROcodone-acetaminophen (NORCO)  mg tablet Take 0.5-1 Tabs by mouth every four (4) hours as needed for Pain (chronic) for up to 30 days. Indications: Pain    CIALIS 5 mg tablet take 1 tablet by mouth once daily    TENS Units georgie Use as directed     No current facility-administered medications for this visit.       REVIEW OF SYSTEMS: colo 11/15 Dr Zackery Patel, sees Dr Raymundo Salgado no vision change or eye pain  Oral  no mouth pain, tongue or tooth problems  Ears  no hearing loss, ear pain, fullness, no swallowing problems  Cardiac  no CP, PND, orthopnea, edema, palpitations or syncope  Chest  no breast masses  Resp  no wheezing, chronic coughing, dyspnea  GI  no heartburn, nausea, vomiting, change in bowel habits, bleeding, hemorrhoids  Urinary  no dysuria, hematuria, flank pain, urgency, frequency  Endo - no polyuria, polydipsia, nocturia, hot flashes    Visit Vitals    /78 (BP 1 Location: Left arm, BP Patient Position: Sitting)    Pulse 74    Temp 98.3 °F (36.8 °C) (Oral)    Resp 14    Ht 5' 10\" (1.778 m)    Wt 193 lb (87.5 kg)    SpO2 96%    BMI 27.69 kg/m2     Affect is appropriate. Mood stable  No apparent distress  HEENT --Anicteric sclerae. No thyromegaly, JVD, or bruits. Lungs --Clear to auscultation, normal percussion. Heart --Regular rate and rhythm, no murmurs, rubs, gallops, or clicks. Chest wall --Nontender to palpation. PMI normal.  Abdomen -- Soft and nontender, no hepatosplenomegaly or masses. Extremities -- Without cyanosis, clubbing, edema. 2+ pulses equally and bilaterally.     LABS  From 10/11 showed        hba1c 5.8, ldl-p 1008, chol 197, tg 200, hdl 45, ldl-c 112,                   psa 0.70, 2 hr   From 6/12  showed  gluc 104, cr 0.94, gfr 90,  alt 20, hba1c 5.7, ldl-p 2204, chol 197, tg 225, hdl 45, ldl-c 107,  tsh 0.62  From 11/12 showed gluc 100, cr 0.94, gfr 89                                ldl-p 1356                       test 316  From 5/13  showed  gluc 95,   cr 0.86                         hba1c 5.6, ldl-p 1183,  chol 147, tg 171, hdl 53, ldl-c 60,            test 313  From 5/13 showed                                test 196, lh 3.0, psa 0.60  From 11/13 showed        hba1c 5.5,     chol 164, tg 165, hdl 47, ldl-c 84  From 3/14 showed   gluc 91,   cr 0.83, gfr 96,  alt 42,      chol 151, tg 160, hdl 42, ldl-c 77,   wbc 7.7,   hb 15.2, plt 249,          psa 0.60, esr 2, spep neg, cea 0.9, crp 1.30, IL-6 1.2  From 5/14 showed   gluc 156, cr 1.02, gfr>60, alt 61,                 wbc 12.8, hb 14.6, plt 231, b12 722, fol>24(on pred)  From 10/14 showed gluc 101, cr 0.90, gfr 93,  alt 45, hba1c 5.7  From 5/15 showed        hba1c 5.7,     chol 155, tg 157, hdl 41, ldl-c 83,   wbc 6.6,   hb 14.6, plt 191, test 103,         psa 0.80  From 11/15 showed        hba1c 5.6,     chol 152, tg 159, hdl 49, ldl-c 81,          test 228  From 5/16 showed           chol 142, tg 118, hdl 38, ldl-c 80,   wbc 6.8,   hb 15.7, plt 203, test 395,         psa 0.9  From 11/16 showed gluc 103, cr 1.14, gfr>60, alt 73,                 test 346  From 5/17 showed           chol 139, tg 182, hdl 45, ldl-c 58,   wbc 7.2,   hb 15.7, plt 217,          psa 0.60, hep c neg    Results for orders placed or performed during the hospital encounter of 41/60/48   METABOLIC PANEL, COMPREHENSIVE   Result Value Ref Range    Sodium 140 136 - 145 mmol/L    Potassium 4.5 3.5 - 5.5 mmol/L    Chloride 102 100 - 108 mmol/L    CO2 31 21 - 32 mmol/L    Anion gap 7 3.0 - 18 mmol/L    Glucose 92 74 - 99 mg/dL    BUN 16 7.0 - 18 MG/DL    Creatinine 1.00 0.6 - 1.3 MG/DL    BUN/Creatinine ratio 16 12 - 20      GFR est AA >60 >60 ml/min/1.73m2    GFR est non-AA >60 >60 ml/min/1.73m2    Calcium 8.7 8.5 - 10.1 MG/DL    Bilirubin, total 0.5 0.2 - 1.0 MG/DL    ALT (SGPT) 59 16 - 61 U/L    AST (SGOT) 27 15 - 37 U/L    Alk.  phosphatase 60 45 - 117 U/L    Protein, total 7.3 6.4 - 8.2 g/dL    Albumin 4.0 3.4 - 5.0 g/dL    Globulin 3.3 2.0 - 4.0 g/dL    A-G Ratio 1.2 0.8 - 1.7     HEMOGLOBIN A1C W/O EAG   Result Value Ref Range    Hemoglobin A1c 5.4 4.2 - 5.6 %   TESTOSTERONE, TOTAL, ADULT MALE   Result Value Ref Range    Testosterone 884 264 - 916 ng/dL     Patient Active Problem List   Diagnosis Code    Dyslipidemia E78.5    S/P lumbar spinal fusion Z98.1    Hypogonadism male E29.1    Erectile dysfunction N52.9    IFG (impaired fasting glucose) R73.01    Primary hypertension I10    Chronic pain syndrome G89.4    Spondylosis of lumbar region without myelopathy or radiculopathy M47.816    Encounter for long-term (current) drug use Z79.899    Lumbar radiculitis M54.16     ASSESSMENT AND PLAN:  1. Hypertension. Continue current regimen. 2.  Fatty liver. Wt loss, exercise, abstinence reiterated  3. Dyslipidemia. Continue current regimen. 4.  FH colon ca. F/U colo 2020  5. Prediabetes. Doing well  6. ED. Continue prn levitra  7. Hypogonadism. Continue current regimen. 8.  Chronic pain. F/U pain clinic        RTC 11/17    Above conditions discussed at length and patient vocalized understanding.   All questions answered to patient satifaction

## 2017-12-01 ENCOUNTER — OFFICE VISIT (OUTPATIENT)
Dept: INTERNAL MEDICINE CLINIC | Age: 62
End: 2017-12-01

## 2017-12-01 VITALS
SYSTOLIC BLOOD PRESSURE: 132 MMHG | HEIGHT: 70 IN | WEIGHT: 193 LBS | OXYGEN SATURATION: 96 % | DIASTOLIC BLOOD PRESSURE: 78 MMHG | RESPIRATION RATE: 14 BRPM | TEMPERATURE: 98.3 F | BODY MASS INDEX: 27.63 KG/M2 | HEART RATE: 74 BPM

## 2017-12-01 DIAGNOSIS — I10 ESSENTIAL HYPERTENSION: ICD-10-CM

## 2017-12-01 DIAGNOSIS — R73.01 IFG (IMPAIRED FASTING GLUCOSE): ICD-10-CM

## 2017-12-01 DIAGNOSIS — G89.4 CHRONIC PAIN SYNDROME: ICD-10-CM

## 2017-12-01 DIAGNOSIS — E29.1 HYPOGONADISM MALE: ICD-10-CM

## 2017-12-01 DIAGNOSIS — E78.5 DYSLIPIDEMIA: Primary | ICD-10-CM

## 2017-12-01 RX ORDER — PREDNISOLONE ACETATE 10 MG/ML
1 SUSPENSION/ DROPS OPHTHALMIC 4 TIMES DAILY
COMMUNITY
End: 2018-12-10

## 2017-12-01 NOTE — MR AVS SNAPSHOT
Visit Information Date & Time Provider Department Dept. Phone Encounter #  
 12/1/2017  9:40 AM Ford Giang MD [facility] [address] [place] [address] [phone] Your Appointments 12/19/2017  8:00 AM  
Follow Up with NICOLE Bourne [address] for [facility] (GABY SCHEDULING) Appt Note: return in 3 months [address] [facility] [address]  
[phone] [phone] 6/11/2018  8:20 AM  
PHYSICAL with Ford Giang MD  
[facility] [address] [place] [address] [address] [facility]) Appt Note: 6 month f/u  
 [address] [facility], [address]ira [address]  
  
   
 [address] [facility] Upcoming Health Maintenance Date Due Influenza Age 5 to Adult 8/1/2017 COLONOSCOPY 11/16/2020 DTaP/Tdap/Td series (2 - Td) 11/14/2026 Allergies as of 12/1/2017  Review Complete On: 12/1/2017 By: Molly Young Severity Noted Reaction Type Reactions Lisinopril    Other (comments) ED Current Immunizations  Reviewed on 5/16/2016 Name Date H1N1 FLU VACCINE 11/11/2009 Influenza Vaccine (Quad) PF 11/14/2016, 11/4/2015 Influenza Vaccine Split 11/21/2012 Influenza Vaccine Whole 10/28/2009 Tdap 11/14/2016 Zoster 7/6/2012  9:12 AM  
  
 Not reviewed this visit Vitals BP Pulse Temp Resp Height(growth percentile) Weight(growth percentile) 132/78 (BP 1 Location: Left arm, BP Patient Position: Sitting) 74 98.3 °F (36.8 °C) (Oral) 14 5' 10\" (1.778 m) 193 lb (87.5 kg) SpO2 BMI Smoking Status 96% 27.69 kg/m2 Former Smoker Vitals History BMI and BSA Data Body Mass Index Body Surface Area  
 27.69 kg/m 2 2.08 m 2 Preferred Pharmacy Pharmacy Name Phone [address], [address] [place] [address] [phone] Your Updated Medication List  
  
 This list is accurate as of: 12/1/17 10:17 AM.  Always use your most recent med list.  
  
  
  
  
 atorvastatin 10 mg tablet Commonly known as:  LIPITOR  
take 1 tablet by mouth daily BD REGULAR BEVEL NEEDLES 18 gauge x 1 1/2\" Ndle Generic drug:  Needle (Disp) 18 G  
USE TO DRAW UP TESTOSTERONE AND THEN CHANGE NEEDLE TO GIVE TESTOSTERONE  
  
 CIALIS 5 mg tablet Generic drug:  tadalafil  
take 1 tablet by mouth once daily DEPO-TESTOSTERONE 100 mg/mL injection Generic drug:  testosterone cypionate  
inject 100 milligrams intramuscularly every 10 days  
  
 diazePAM 10 mg tablet Commonly known as:  VALIUM Take 1 Tab by mouth every six (6) hours as needed for Anxiety. Max Daily Amount: 40 mg. FISH OIL 1,000 mg Cap Generic drug:  omega-3 fatty acids-vitamin e Take 1 Cap by mouth.  
  
 * HYDROcodone-acetaminophen  mg tablet Commonly known as:  Sophy Romario Take 0.5-1 Tabs by mouth every four (4) hours as needed for Pain (chronic) for up to 30 days. Indications: Pain  
  
 * HYDROcodone-acetaminophen  mg tablet Commonly known as:  Sophy Romario Take 0.5-1 Tabs by mouth every four (4) hours as needed for Pain (chronic) for up to 30 days. Indications: Pain Start taking on:  12/5/2017  
  
 losartan-hydroCHLOROthiazide 100-25 mg per tablet Commonly known as:  HYZAAR  
take 1 tablet by mouth once daily MULTI FOR HIM PO Take  by mouth.  
  
 naloxone 4 mg/actuation nasal spray Commonly known as:  NARCAN  
4 mg by Nasal route once as needed (opioid overdose) for up to 1 dose. May substitute 2 mg syringe if insurance requires  
  
 prednisoLONE acetate 1 % ophthalmic suspension Commonly known as:  PRED FORTE Administer 1 Drop to right eye four (4) times daily. Syringe (Disposable) 3 mL Syrg Pt to use with Testosterone injections. TENS Units Dilia Razor Use as directed  
  
 vardenafil 20 mg tablet Commonly known as:  LEVITRA Take 20 mg by mouth as needed. * Notice: This list has 2 medication(s) that are the same as other medications prescribed for you. Read the directions carefully, and ask your doctor or other care provider to review them with you. Introducing [facility] & [facility] SERVICES! Dear Abraham: Thank you for requesting a Seeq account. Our records indicate that you already have an active Seeq account. You can access your account anytime at [email or web address] Did you know that you can access your hospital and ER discharge instructions at any time in Seeq? You can also review all of your test results from your hospital stay or ER visit. Additional Information If you have questions, please visit the Frequently Asked Questions section of the Seeq website at [email or web address] Remember, Seeq is NOT to be used for urgent needs. For medical emergencies, dial 911. Now available from your iPhone and Android! Please provide this summary of care documentation to your next provider. Your primary care clinician is listed as Troy Roy. If you have any questions after today's visit, please call [phone].

## 2017-12-01 NOTE — PROGRESS NOTES
1. Have you been to the ER, urgent care clinic or hospitalized since your last visit? YES. 10/31/17 Aleisha    2. Have you seen or consulted any other health care providers outside of the [address] [place] [address] since your last visit (Include any pap smears or colon screening)? YES  Aleisha    Do you have an Advanced Directive? YES    Would you like information on Advanced Directives?  NO

## 2017-12-04 ENCOUNTER — PATIENT MESSAGE (OUTPATIENT)
Dept: INTERNAL MEDICINE CLINIC | Age: 62
End: 2017-12-04

## 2017-12-11 ENCOUNTER — TELEPHONE (OUTPATIENT)
Dept: INTERNAL MEDICINE CLINIC | Age: 62
End: 2017-12-11

## 2017-12-11 RX ORDER — VARDENAFIL HYDROCHLORIDE 20 MG/1
20 TABLET ORAL AS NEEDED
Qty: 30 TAB | Refills: 11 | Status: SHIPPED | OUTPATIENT
Start: 2017-12-11 | End: 2019-07-09 | Stop reason: SDUPTHER

## 2017-12-11 NOTE — TELEPHONE ENCOUNTER
From: Abraham Vazquez  To: Matt Garcia MD  Sent: 12/4/2017 7:56 PM EST  Subject: Prescription Question    Just a reminder to Dr. John Gómez for a Levitra prescription and a web site we talked about. I can come to the office if needed, or I think he said he would email me. [email or web address]     Thanks for all you do!!!

## 2017-12-13 RX ORDER — DIAZEPAM 10 MG/1
10 TABLET ORAL
Qty: 30 TAB | Refills: 1 | OUTPATIENT
Start: 2017-12-13 | End: 2018-03-21 | Stop reason: SDUPTHER

## 2017-12-13 NOTE — TELEPHONE ENCOUNTER
From: Abraham Power  To: Amanda Velasquez MD  Sent: 12/12/2017 6:45 PM EST  Subject: Medication Renewal Request    Original authorizing provider: MD Abraham Black would like a refill of the following medications:  diazePAM (VALIUM) 10 mg tablet Amanda Velasquez MD]    Preferred pharmacy: [address] [place] [address] Ave:

## 2017-12-19 ENCOUNTER — OFFICE VISIT (OUTPATIENT)
Dept: PAIN MANAGEMENT | Age: 62
End: 2017-12-19

## 2017-12-19 VITALS
HEIGHT: 70 IN | HEART RATE: 93 BPM | RESPIRATION RATE: 16 BRPM | DIASTOLIC BLOOD PRESSURE: 85 MMHG | BODY MASS INDEX: 27.63 KG/M2 | SYSTOLIC BLOOD PRESSURE: 161 MMHG | WEIGHT: 193 LBS | TEMPERATURE: 97.9 F

## 2017-12-19 DIAGNOSIS — M54.16 LUMBAR RADICULITIS: ICD-10-CM

## 2017-12-19 DIAGNOSIS — M47.816 SPONDYLOSIS OF LUMBAR REGION WITHOUT MYELOPATHY OR RADICULOPATHY: ICD-10-CM

## 2017-12-19 DIAGNOSIS — G89.4 CHRONIC PAIN SYNDROME: Primary | ICD-10-CM

## 2017-12-19 RX ORDER — HYDROCODONE BITARTRATE AND ACETAMINOPHEN 10; 325 MG/1; MG/1
TABLET ORAL
Qty: 150 TAB | Refills: 0 | Status: SHIPPED | OUTPATIENT
Start: 2018-02-03 | End: 2018-03-19 | Stop reason: SDUPTHER

## 2017-12-19 RX ORDER — HYDROCODONE BITARTRATE AND ACETAMINOPHEN 10; 325 MG/1; MG/1
TABLET ORAL
Qty: 150 TAB | Refills: 0 | Status: SHIPPED | OUTPATIENT
Start: 2018-03-04 | End: 2018-03-19 | Stop reason: SDUPTHER

## 2017-12-19 RX ORDER — HYDROCODONE BITARTRATE AND ACETAMINOPHEN 10; 325 MG/1; MG/1
TABLET ORAL
Qty: 150 TAB | Refills: 0 | Status: SHIPPED | OUTPATIENT
Start: 2018-01-04 | End: 2018-03-19 | Stop reason: SDUPTHER

## 2017-12-19 NOTE — PATIENT INSTRUCTIONS
1. Continue current plan with no evidence of addiction or diversion. Stable on current medication without adverse events. 2. Refill and adjust hydrocodone 10/325 mg. Take half to 1 tablet up to 5 times daily as needed. 3. Consider long-acting medication later if needed. 4. Naloxone 4 mg nasal spray for opioid induced respiratory depression emergency only. 5. Discussed risks of addiction, dependency, and opioid induced hyperalgesia.    6. Return to clinic in 3 months

## 2017-12-19 NOTE — PROGRESS NOTES
Nursing Notes    Patient presents to the office today in follow-up. Patient rates his pain at 5/10 on the numerical pain scale. Reviewed medications with counts as follows:    Rx Date filled Qty Dispensed Pill Count Last Dose Short   NORCO  MG TAB 12/5/17 180 105 TODAY NO                                          Comments:     POC UDS was not performed in office today    Any new labs or imaging since last appointment? YES, lab work. Have you been to an emergency room (ER) or urgent care clinic since your last visit? NO            Have you been hospitalized since your last visit? NO     If yes, where, when, and reason for visit? Have you seen or consulted any other health care providers outside of the [address] [place] [address]  since your last visit? YES, PCP. If yes, where, when, and reason for visit? HM deferred to pcp.

## 2017-12-19 NOTE — MR AVS SNAPSHOT
Visit Information Date & Time Provider Department Dept. Phone Encounter #  
 12/19/2017  8:00 AM Allyson Lala, [facility] for [facility] Management [id number] Follow-up Instructions Return in about 3 months (around 3/19/2018). Your Appointments 6/11/2018  8:20 AM  
PHYSICAL with Patria Saint, MD  
[facility] [address] [place] [address]) Appt Note: 6 month f/u  
 [address] [facility], [address] Jilda Pollack [address] [place]  
  
   
 [address], [address] Upcoming Health Maintenance Date Due COLONOSCOPY 11/16/2020 DTaP/Tdap/Td series (2 - Td) 11/14/2026 Allergies as of 12/19/2017  Review Complete On: 12/19/2017 By: NICOLE Capps Severity Noted Reaction Type Reactions Lisinopril    Other (comments) ED Current Immunizations  Reviewed on 12/2/2017 Name Date H1N1 FLU VACCINE 11/11/2009 Influenza Vaccine 11/1/2017 Influenza Vaccine (Quad) PF 11/14/2016, 11/4/2015 Influenza Vaccine Split 11/21/2012 Influenza Vaccine Whole 10/28/2009 Tdap 11/14/2016 Zoster 7/6/2012  9:12 AM  
  
 Not reviewed this visit You Were Diagnosed With   
  
 Codes Comments Chronic pain syndrome    -  Primary ICD-10-CM: G89.4 ICD-9-CM: 338.4 Lumbar radiculitis     ICD-10-CM: M54.16 
ICD-9-CM: 724.4 Spondylosis of lumbar region without myelopathy or radiculopathy     ICD-10-CM: M47.816 ICD-9-CM: 721.3 Vitals BP Pulse Temp Resp Height(growth percentile) Weight(growth percentile) 161/85 (BP 1 Location: Left arm, BP Patient Position: Sitting) 93 97.9 °F (36.6 °C) (Oral) 16 5' 10\" (1.778 m) 193 lb (87.5 kg) BMI Smoking Status 27.69 kg/m2 Former Smoker Vitals History BMI and BSA Data Body Mass Index Body Surface Area  
 27.69 kg/m 2 2.08 m 2 Preferred Pharmacy Pharmacy Name Phone [address] [phone] Your Updated Medication List  
  
   
This list is accurate as of: 12/19/17  8:39 AM.  Always use your most recent med list.  
  
  
  
  
 atorvastatin 10 mg tablet Commonly known as:  LIPITOR  
take 1 tablet by mouth daily BD REGULAR BEVEL NEEDLES 18 gauge x 1 1/2\" Ndle Generic drug:  Needle (Disp) 18 G  
USE TO DRAW UP TESTOSTERONE AND THEN CHANGE NEEDLE TO GIVE TESTOSTERONE  
  
 CIALIS 5 mg tablet Generic drug:  tadalafil  
take 1 tablet by mouth once daily DEPO-TESTOSTERONE 100 mg/mL injection Generic drug:  testosterone cypionate  
inject 100 milligrams intramuscularly every 10 days  
  
 diazePAM 10 mg tablet Commonly known as:  VALIUM Take 1 Tab by mouth every six (6) hours as needed for Anxiety. Max Daily Amount: 40 mg. FISH OIL 1,000 mg Cap Generic drug:  omega-3 fatty acids-vitamin e Take 1 Cap by mouth.  
  
 * HYDROcodone-acetaminophen  mg tablet Commonly known as:  Rolinda Doles Take 0.5-1 Tabs by mouth every four (4) hours as needed for Pain (chronic) for up to 30 days. Indications: Pain  
  
 * HYDROcodone-acetaminophen  mg tablet Commonly known as:  Rolinda Doles Take half to 1 tablet up to 5 times daily as needed for chronic, severe pain. Indications: Pain Start taking on:  1/4/2018  
  
 * HYDROcodone-acetaminophen  mg tablet Commonly known as:  Rolinda Doles Take half to 1 tablet up to 5 times daily as needed for chronic, severe pain. Indications: Pain Start taking on:  2/3/2018  
  
 * HYDROcodone-acetaminophen  mg tablet Commonly known as:  Rolinda Doles Take half to 1 tablet up to 5 times daily as needed for chronic, severe pain. Indications: Pain Start taking on:  3/4/2018  
  
 losartan-hydroCHLOROthiazide 100-25 mg per tablet Commonly known as:  HYZAAR  
take 1 tablet by mouth once daily MULTI FOR HIM PO Take  by mouth. naloxone 4 mg/actuation nasal spray Commonly known as:  NARCAN  
4 mg by Nasal route once as needed (opioid overdose) for up to 1 dose. May substitute 2 mg syringe if insurance requires  
  
 prednisoLONE acetate 1 % ophthalmic suspension Commonly known as:  PRED FORTE Administer 1 Drop to right eye four (4) times daily. Syringe (Disposable) 3 mL Syrg Pt to use with Testosterone injections. TENS Units Vanesa Morton Use as directed  
  
 vardenafil 20 mg tablet Commonly known as:  LEVITRA Take 20 mg by mouth as needed. * Notice: This list has 4 medication(s) that are the same as other medications prescribed for you. Read the directions carefully, and ask your doctor or other care provider to review them with you. Prescriptions Printed Refills HYDROcodone-acetaminophen (NORCO)  mg tablet 0 Starting on: 1/4/2018 Sig: Take half to 1 tablet up to 5 times daily as needed for chronic, severe pain. Indications: Pain Class: Print HYDROcodone-acetaminophen (NORCO)  mg tablet 0 Starting on: 2/3/2018 Sig: Take half to 1 tablet up to 5 times daily as needed for chronic, severe pain. Indications: Pain Class: Print HYDROcodone-acetaminophen (NORCO)  mg tablet 0 Starting on: 3/4/2018 Sig: Take half to 1 tablet up to 5 times daily as needed for chronic, severe pain. Indications: Pain Class: Print Follow-up Instructions Return in about 3 months (around 3/19/2018). Patient Instructions 1. Continue current plan with no evidence of addiction or diversion. Stable on current medication without adverse events. 2. Refill and adjust hydrocodone 10/325 mg. Take half to 1 tablet up to 5 times daily as needed. 3. Consider long-acting medication later if needed. 4. Naloxone 4 mg nasal spray for opioid induced respiratory depression emergency only.    
5. Discussed risks of addiction, dependency, and opioid induced hyperalgesia. 6. Return to clinic in 3 months Introducing [facility] & [facility] SERVICES! Dear Abraham: Thank you for requesting a Klevosti account. Our records indicate that you already have an active Klevosti account. You can access your account anytime at [email or web address] Did you know that you can access your hospital and ER discharge instructions at any time in Klevosti? You can also review all of your test results from your hospital stay or ER visit. Additional Information If you have questions, please visit the Frequently Asked Questions section of the Klevosti website at [email or web address] Remember, Klevosti is NOT to be used for urgent needs. For medical emergencies, dial 911. Now available from your iPhone and Android! Please provide this summary of care documentation to your next provider. Your primary care clinician is listed as Gordo Cox. If you have any questions after today's visit, please call [phone].

## 2017-12-19 NOTE — PROGRESS NOTES
HISTORY OF PRESENT ILLNESS  Abraham Siegel is a 60 y.o. male    HPI: Mr. Noah Siegel  returns today for f/u of chronic pain due to lumbar spondylosis, lumbar postlaminectomy syndrome, chronic right lower radiculopathy, and right hip osteoarthritis. H/o lumbar surgery with Dr. Abraham Adamson 2014. Today is my first visit with Mr. Noah Siegel. He continues with pain unchanged since last visit. We discussed his current condition and medications in detail today. He has been doing very well with his current treatment plan which has been offering significant pain control. He does admit that he has been using his hydrocodone on a set schedule for quite some time. When lengthy conversation about his medications today. I have strongly encouraged him to consider a long-acting medication in the future. For now we discussed tapering down his hydrocodone to use half to 1 tablet no more than 5 per day. He is in agreement with this plan would like to try this before starting long-acting medication. I will have him follow-up in 3 months for further evaluation or sooner if needed. Medications are helping with pain control and quality of life. His pain is 1-2/10 with medication and 7-8/10 without. Pt describes pain as aching. Current treatment is helping to improve general activity, mood, walking, sleep, enjoyment of life    Mr. Noah Siegel is tolerating medications well, with no side effects noted. He is able to stay more active with less discomfort with these current doses. The patient reports an average of 75% pain relief with current treatment/medications. Pill counts are appropriate. He is informed of side effects, risks, and benefits of this regimen, and emphasizes that he derives a significant improvement in functionality and quality of life, and notes that non-opioid medications and therapies in the past have not offered significant benefit. He  is otherwise doing well with no other complaints today.  He denies any adverse events including nausea, vomiting, dizziness, increased constipation, hallucinations, or seizures. Because the patient's current regimen places him/her at increased risk for possible overdose, a prescription for naloxone nasal spray has been provided. The patient understands that this medication is only to be used in the setting of a possible overdose and that inadvertent use of this medication could precipitate overt withdrawal.         Allergies   Allergen Reactions    Lisinopril Other (comments)     ED       Past Surgical History:   Procedure Laterality Date    CHEST SURGERY PROCEDURE UNLISTED      resection of a RUL lung lesion for recurring polyps 1980s    HX CATARACT REMOVAL  09/2014    Dr Zeb West right     HX COLONOSCOPY      negative Dr. Veronica Coronado 2005, 2010; Dr Kadie Ogden 11/15 hemorrhoids    HX CORNEAL TRANSPLANT  10/31/2017    Dr Callie Baez right    HX Kathleen English  2012    Dr. Deshaun Chacko right buttock    HX LUMBAR LAMINECTOMY           Review of Systems   Constitutional: Negative for chills and fever. HENT: Negative for congestion and sore throat. Eyes: Negative for blurred vision and double vision. Respiratory: Negative for cough, shortness of breath and wheezing. Cardiovascular: Negative for chest pain and palpitations. Gastrointestinal: Negative for abdominal pain, constipation, diarrhea, heartburn, nausea and vomiting. Genitourinary: Negative. Musculoskeletal: Positive for back pain and neck pain. Negative for joint pain. Neurological: Negative for dizziness, seizures and loss of consciousness. Endo/Heme/Allergies: Does not bruise/bleed easily. Psychiatric/Behavioral: Negative for depression. The patient is not nervous/anxious and does not have insomnia. Physical Exam   Constitutional: He is oriented to person, place, and time and well-developed, well-nourished, and in no distress. No distress. HENT:   Head: Normocephalic and atraumatic.    Eyes: EOM are normal. Pulmonary/Chest: Effort normal.   Neurological: He is alert and oriented to person, place, and time. Skin: Skin is dry. No rash noted. No erythema. Psychiatric: Mood, memory, affect and judgment normal.   Nursing note and vitals reviewed. ASSESSMENT:    1. Chronic pain syndrome    2. Lumbar radiculitis    3. Spondylosis of lumbar region without myelopathy or radiculopathy           [place] Prescription Monitoring Program was reviewed which does not demonstrate aberrancies and/or inconsistencies with regard to the historical prescribing of controlled medications to this patient by other providers. PLAN / Pt Instructions:  1. Continue current plan with no evidence of addiction or diversion. Stable on current medication without adverse events. 2. Refill and adjust hydrocodone 10/325 mg. Take half to 1 tablet up to 5 times daily as needed. 3. Consider long-acting medication later if needed. 4. Naloxone 4 mg nasal spray for opioid induced respiratory depression emergency only. 5. Discussed risks of addiction, dependency, and opioid induced hyperalgesia. 6. Return to clinic in 3 months    Medications Ordered Today   Medications    HYDROcodone-acetaminophen (NORCO)  mg tablet     Sig: Take half to 1 tablet up to 5 times daily as needed for chronic, severe pain. Indications: Pain     Dispense:  150 Tab     Refill:  0    HYDROcodone-acetaminophen (NORCO)  mg tablet     Sig: Take half to 1 tablet up to 5 times daily as needed for chronic, severe pain. Indications: Pain     Dispense:  150 Tab     Refill:  0    HYDROcodone-acetaminophen (NORCO)  mg tablet     Sig: Take half to 1 tablet up to 5 times daily as needed for chronic, severe pain. Indications: Pain     Dispense:  150 Tab     Refill:  0           DISPOSITION     Pain medications are prescribed with the objective of pain relief and improved physical and psychosocial function in this patient.     Pain Meds and Quality Of Life have been reviewed. Nonpharmacologic therapy and non-opioid pharmacologic therapy have been considered. Opioid therapy is only prescribed if the expected benefits are anticipated to outweigh risks.  Counseled patient on proper use of prescribed medications.  Reviewed with patient self-help tools, home exercise, and lifestyle changes to assist the patient in self-management of symptoms.  Reviewed with patient the treatment plan, goals of treatment plan, and limitations of treatment plan, to include the potential for side effects from medications and procedures. If side effects occur, it is the responsibility of the patient to inform the clinic so that a change in the treatment plan can be made in a safe manner. The patient is advised that stopping prescribed medication may cause an increase in symptoms and possible medication withdrawal symptoms. The patient is informed an emergency room evaluation may be necessary if this occurs. Spent 25 minutes with patient today which more than 50% of that time was spent on counseling and coordination of care. Margret Kilgorema 12/19/2017        Note: Please excuse any typographical errors. Voice recognition software was used for this note and may cause mistakes.

## 2018-01-26 ENCOUNTER — TELEPHONE (OUTPATIENT)
Dept: INTERNAL MEDICINE CLINIC | Age: 63
End: 2018-01-26

## 2018-03-19 ENCOUNTER — OFFICE VISIT (OUTPATIENT)
Dept: PAIN MANAGEMENT | Age: 63
End: 2018-03-19

## 2018-03-19 VITALS
BODY MASS INDEX: 27.63 KG/M2 | HEART RATE: 94 BPM | SYSTOLIC BLOOD PRESSURE: 148 MMHG | RESPIRATION RATE: 16 BRPM | DIASTOLIC BLOOD PRESSURE: 89 MMHG | HEIGHT: 70 IN | TEMPERATURE: 97.7 F | WEIGHT: 193 LBS

## 2018-03-19 DIAGNOSIS — M47.816 SPONDYLOSIS OF LUMBAR REGION WITHOUT MYELOPATHY OR RADICULOPATHY: ICD-10-CM

## 2018-03-19 DIAGNOSIS — M54.16 LUMBAR RADICULITIS: ICD-10-CM

## 2018-03-19 DIAGNOSIS — G89.4 CHRONIC PAIN SYNDROME: ICD-10-CM

## 2018-03-19 DIAGNOSIS — Z79.899 ENCOUNTER FOR LONG-TERM (CURRENT) USE OF HIGH-RISK MEDICATION: Primary | ICD-10-CM

## 2018-03-19 LAB
ALCOHOL UR POC: NORMAL
AMPHETAMINES UR POC: NEGATIVE
BARBITURATES UR POC: NORMAL
BENZODIAZEPINES UR POC: NORMAL
BUPRENORPHINE UR POC: NEGATIVE
CANNABINOIDS UR POC: NEGATIVE
CARISOPRODOL UR POC: NORMAL
COCAINE UR POC: NEGATIVE
FENTANYL UR POC: NORMAL
MDMA/ECSTASY UR POC: NORMAL
METHADONE UR POC: NEGATIVE
METHAMPHETAMINE UR POC: NORMAL
METHYLPHENIDATE UR POC: NORMAL
OPIATES UR POC: NORMAL
OXYCODONE UR POC: NORMAL
PHENCYCLIDINE UR POC: NORMAL
PROPOXYPHENE UR POC: NORMAL
TRAMADOL UR POC: NORMAL
TRICYCLICS UR POC: NORMAL

## 2018-03-19 RX ORDER — HYDROCODONE BITARTRATE AND ACETAMINOPHEN 10; 325 MG/1; MG/1
TABLET ORAL
Qty: 150 TAB | Refills: 0 | Status: SHIPPED | OUTPATIENT
Start: 2018-04-03 | End: 2018-06-18 | Stop reason: SDUPTHER

## 2018-03-19 RX ORDER — HYDROCODONE BITARTRATE AND ACETAMINOPHEN 10; 325 MG/1; MG/1
TABLET ORAL
Qty: 150 TAB | Refills: 0 | Status: SHIPPED | OUTPATIENT
Start: 2018-06-01 | End: 2018-06-11 | Stop reason: SDUPTHER

## 2018-03-19 RX ORDER — HYDROCODONE BITARTRATE AND ACETAMINOPHEN 10; 325 MG/1; MG/1
TABLET ORAL
Qty: 150 TAB | Refills: 0 | Status: SHIPPED | OUTPATIENT
Start: 2018-05-02 | End: 2018-06-11 | Stop reason: SDUPTHER

## 2018-03-19 NOTE — PROGRESS NOTES
HISTORY OF PRESENT ILLNESS  Abraham Mcdowell is a 60 y.o. male    HPI: Mr. Leydi Mcdowell  returns today for f/u of chronic pain due to lumbar spondylosis, lumbar postlaminectomy syndrome, chronic right lower radiculopathy, and right hip osteoarthritis. H/o lumbar surgery with Dr. Paula Jeans 2014. Mr. Leydi Mcdowell continues unchanged since last visit. I continue to encourage him to use his hydrocodone on an as-needed basis only. We did discuss long-acting medication again today. We will see how he does within the next few months and discuss whether to switch to long-acting medication next visit. He is otherwise doing well with no new complaints today. I will have him follow-up in 3 months for further evaluation and recommendation. Current medication management includes hydrocodone 10/325 mg 5/day as needed. Receives Valium from outside provider. Medications are helping with pain control and quality of life. His pain is 1-2/10 with medication and 7-8/10 without. Pt describes pain as aching. Current treatment is helping to improve general activity, mood, walking, sleep, enjoyment of life    Mr. Leydi Mcdowell is tolerating medications well, with no side effects noted. He is able to stay more active with less discomfort with these current doses. The patient reports an average of 75% pain relief with current treatment/medications. Pill counts are appropriate. He is informed of side effects, risks, and benefits of this regimen, and emphasizes that he derives a significant improvement in functionality and quality of life, and notes that non-opioid medications and therapies in the past have not offered significant benefit. He  is otherwise doing well with no other complaints today. He denies any adverse events including nausea, vomiting, dizziness, increased constipation, hallucinations, or seizures.      Because the patient's current regimen places him/her at increased risk for possible overdose, a prescription for naloxone nasal spray has been provided. The patient understands that this medication is only to be used in the setting of a possible overdose and that inadvertent use of this medication could precipitate overt withdrawal.    POC UDS today. Confirmation pending. Allergies   Allergen Reactions    Lisinopril Other (comments)     ED       Past Surgical History:   Procedure Laterality Date    CHEST SURGERY PROCEDURE UNLISTED      resection of a RUL lung lesion for recurring polyps 1980s    HX CATARACT REMOVAL  09/2014    Dr Hanny Palacios right     HX COLONOSCOPY      negative Dr. Manjula Denny 2005, 2010; Dr Aditi Terry 11/15 hemorrhoids    HX CORNEAL TRANSPLANT  10/31/2017    Dr Yoel Gonzáles right    HX Luigi Keenes  2012    Dr. Clyde Perez right buttock    HX LUMBAR LAMINECTOMY           Review of Systems   Constitutional: Negative for chills and fever. HENT: Negative for congestion and sore throat. Eyes: Negative for blurred vision and double vision. Respiratory: Negative for cough, shortness of breath and wheezing. Cardiovascular: Negative for chest pain and palpitations. Gastrointestinal: Negative for abdominal pain, constipation, diarrhea, heartburn, nausea and vomiting. Genitourinary: Negative. Musculoskeletal: Positive for back pain and neck pain. Negative for joint pain. Neurological: Negative for dizziness, seizures and loss of consciousness. Endo/Heme/Allergies: Does not bruise/bleed easily. Psychiatric/Behavioral: Negative for depression. The patient is not nervous/anxious and does not have insomnia. Physical Exam   Constitutional: He is oriented to person, place, and time and well-developed, well-nourished, and in no distress. No distress. HENT:   Head: Normocephalic and atraumatic. Eyes: EOM are normal.   Pulmonary/Chest: Effort normal.   Neurological: He is alert and oriented to person, place, and time. Skin: Skin is dry. No rash noted. No erythema.    Psychiatric: Mood, memory, affect and judgment normal.   Nursing note and vitals reviewed. ASSESSMENT:    1. Encounter for long-term (current) use of high-risk medication    2. Chronic pain syndrome    3. Spondylosis of lumbar region without myelopathy or radiculopathy    4. Lumbar radiculitis           [place] Prescription Monitoring Program was reviewed which does not demonstrate aberrancies and/or inconsistencies with regard to the historical prescribing of controlled medications to this patient by other providers. PLAN / Pt Instructions:  1. Continue current plan with no evidence of addiction or diversion. Stable on current medication without adverse events. 2. Refill hydrocodone 10/325 mg. Take half to 1 tablet up to 5 times daily as needed. 3. Consider long-acting medication later if needed. 4. Naloxone 4 mg nasal spray for opioid induced respiratory depression emergency only. 5. Discussed risks of addiction, dependency, and opioid induced hyperalgesia. 6. Return to clinic in 3 months    Medications Ordered Today   Medications    HYDROcodone-acetaminophen (NORCO)  mg tablet     Sig: Take half to 1 tablet up to 5 times daily as needed for chronic, severe pain. Indications: Pain     Dispense:  150 Tab     Refill:  0    HYDROcodone-acetaminophen (NORCO)  mg tablet     Sig: Take half to 1 tablet up to 5 times daily as needed for chronic, severe pain. Indications: Pain     Dispense:  150 Tab     Refill:  0    HYDROcodone-acetaminophen (NORCO)  mg tablet     Sig: Take half to 1 tablet up to 5 times daily as needed for chronic, severe pain. Indications: Pain     Dispense:  150 Tab     Refill:  0           DISPOSITION     Pain medications are prescribed with the objective of pain relief and improved physical and psychosocial function in this patient.  Pain Meds and Quality Of Life have been reviewed. Nonpharmacologic therapy and non-opioid pharmacologic therapy have been considered.  Opioid therapy is only prescribed if the expected benefits are anticipated to outweigh risks.  Counseled patient on proper use of prescribed medications.  Reviewed with patient self-help tools, home exercise, and lifestyle changes to assist the patient in self-management of symptoms.  Reviewed with patient the treatment plan, goals of treatment plan, and limitations of treatment plan, to include the potential for side effects from medications and procedures. If side effects occur, it is the responsibility of the patient to inform the clinic so that a change in the treatment plan can be made in a safe manner. The patient is advised that stopping prescribed medication may cause an increase in symptoms and possible medication withdrawal symptoms. The patient is informed an emergency room evaluation may be necessary if this occurs. Spent 25 minutes with patient today which more than 50% of that time was spent on counseling and coordination of care. Jimmy Zaidi, [address] 3/19/2018        Note: Please excuse any typographical errors. Voice recognition software was used for this note and may cause mistakes.

## 2018-03-19 NOTE — MR AVS SNAPSHOT
[address] [place] [address] 
[phone] Patient: Chuck Elizabeth MRN:  [id number] Visit Information Date & Time Provider Department Dept. Phone Encounter #  
 3/19/2018 10:00 AM [facility] [phone] [id number] Your Appointments 6/11/2018  8:20 AM  
PHYSICAL with Mariam Hunter MD  
[facility] Appt Note: 6 month f/u  
 [address] [facility], [address] [id number] [address] [place]  
  
   
 [address], [address] Upcoming Health Maintenance Date Due COLONOSCOPY 11/16/2020 DTaP/Tdap/Td series (2 - Td) 11/14/2026 Allergies as of 3/19/2018  Review Complete On: 3/19/2018 By: NICOLE Beard Severity Noted Reaction Type Reactions Lisinopril    Other (comments) ED Current Immunizations  Reviewed on 12/2/2017 Name Date H1N1 FLU VACCINE 11/11/2009 Influenza Vaccine 11/1/2017 Influenza Vaccine (Quad) PF 11/14/2016, 11/4/2015 Influenza Vaccine Split 11/21/2012 Influenza Vaccine Whole 10/28/2009 Tdap 11/14/2016 Zoster 7/6/2012  9:12 AM  
  
 Not reviewed this visit You Were Diagnosed With   
  
 Codes Comments Encounter for long-term (current) use of high-risk medication    -  Primary ICD-10-CM: H10.587 ICD-9-CM: V58.69 Chronic pain syndrome     ICD-10-CM: G89.4 ICD-9-CM: 338.4 Spondylosis of lumbar region without myelopathy or radiculopathy     ICD-10-CM: M47.816 ICD-9-CM: 721.3 Lumbar radiculitis     ICD-10-CM: M54.16 
ICD-9-CM: 724.4 Vitals BP Pulse Temp Resp Height(growth percentile) Weight(growth percentile) 148/89 (BP 1 Location: Left arm, BP Patient Position: Sitting) 94 97.7 °F (36.5 °C) 16 5' 10\" (1.778 m) 193 lb (87.5 kg) BMI Smoking Status 27.69 kg/m2 Former Smoker BMI and BSA Data Body Mass Index Body Surface Area  
 27.69 kg/m 2 2.08 m 2 Preferred Pharmacy Pharmacy Name Phone [address], [address] [place] [address] [phone] Your Updated Medication List  
  
   
This list is accurate as of 3/19/18 11:10 AM.  Always use your most recent med list.  
  
  
  
  
 atorvastatin 10 mg tablet Commonly known as:  LIPITOR  
take 1 tablet by mouth daily BD REGULAR BEVEL NEEDLES 18 gauge x 1 1/2\" Ndle Generic drug:  Needle (Disp) 18 G  
USE TO DRAW UP TESTOSTERONE AND THEN CHANGE NEEDLE TO GIVE TESTOSTERONE  
  
 CIALIS 5 mg tablet Generic drug:  tadalafil  
take 1 tablet by mouth once daily DEPO-TESTOSTERONE 100 mg/mL injection Generic drug:  testosterone cypionate  
inject 100 milligrams intramuscularly every 10 days  
  
 diazePAM 10 mg tablet Commonly known as:  VALIUM Take 1 Tab by mouth every six (6) hours as needed for Anxiety. Max Daily Amount: 40 mg. FISH OIL 1,000 mg Cap Generic drug:  omega-3 fatty acids-vitamin e Take 1 Cap by mouth.  
  
 * HYDROcodone-acetaminophen  mg tablet Commonly known as:  [place] Take 0.5-1 Tabs by mouth every four (4) hours as needed for Pain (chronic) for up to 30 days. Indications: Pain  
  
 * HYDROcodone-acetaminophen  mg tablet Commonly known as:  [place] Take half to 1 tablet up to 5 times daily as needed for chronic, severe pain. Indications: Pain Start taking on:  4/3/2018  
  
 * HYDROcodone-acetaminophen  mg tablet Commonly known as:  [place] Take half to 1 tablet up to 5 times daily as needed for chronic, severe pain. Indications: Pain Start taking on:  5/2/2018  
  
 * HYDROcodone-acetaminophen  mg tablet Commonly known as:  [place] Take half to 1 tablet up to 5 times daily as needed for chronic, severe pain. Indications: Pain Start taking on:  6/1/2018 losartan-hydroCHLOROthiazide 100-25 mg per tablet Commonly known as:  HYZAAR  
take 1 tablet by mouth once daily MULTI FOR HIM PO Take  by mouth.  
  
 naloxone 4 mg/actuation nasal spray Commonly known as:  NARCAN  
4 mg by Nasal route once as needed (opioid overdose) for up to 1 dose. May substitute 2 mg syringe if insurance requires  
  
 prednisoLONE acetate 1 % ophthalmic suspension Commonly known as:  PRED FORTE Administer 1 Drop to right eye four (4) times daily. Syringe (Disposable) 3 mL Syrg Pt to use with Testosterone injections. TENS Units [employer] Use as directed  
  
 vardenafil 20 mg tablet Commonly known as:  LEVITRA Take 20 mg by mouth as needed. * Notice: This list has 4 medication(s) that are the same as other medications prescribed for you. Read the directions carefully, and ask your doctor or other care provider to review them with you. Prescriptions Printed Refills HYDROcodone-acetaminophen (NORCO)  mg tablet 0 Starting on: 4/3/2018 Sig: Take half to 1 tablet up to 5 times daily as needed for chronic, severe pain. Indications: Pain Class: Print HYDROcodone-acetaminophen (NORCO)  mg tablet 0 Starting on: 5/2/2018 Sig: Take half to 1 tablet up to 5 times daily as needed for chronic, severe pain. Indications: Pain Class: Print HYDROcodone-acetaminophen (NORCO)  mg tablet 0 Starting on: 6/1/2018 Sig: Take half to 1 tablet up to 5 times daily as needed for chronic, severe pain. Indications: Pain Class: Print We Performed the Following AMB POC DRUG SCREEN () [ HCP] DRUG SCREEN [[id number] Custom] Introducing [facility]! Dear Abraham: Thank you for requesting a [employer] account. Our records indicate that you already have an active [employer] account. You can access your account anytime at [email or web address] Did you know that you can access your hospital and ER discharge instructions at any time in [employer]? You can also review all of your test results from your hospital stay or ER visit. Additional Information If you have questions, please visit the Frequently Asked Questions section of the [employer] website at [email or web address] Remember, [employer] is NOT to be used for urgent needs. For medical emergencies, dial 911. Now available from your iPhone and Android! Please provide this summary of care documentation to your next provider. Your primary care clinician is listed as Shannan Cuellar. If you have any questions after today's visit, please call [phone].

## 2018-03-19 NOTE — PROGRESS NOTES
Nursing Notes    Patient presents to the office today in follow-up. Patient rates his pain at 2/10 on the numerical pain scale. Reviewed medications with counts as follows:    Rx Date filled Qty Dispensed Pill Count Last Dose Short   Norco 10 mg 03/04/18 150 78 This am  no         Comments: Patient is here today for a follow up appt today he states his pain level today is a 2  He states he had a cornea replacement     POC UDS was performed in office today per verbal order per [facility]    Any new labs or imaging since last appointment? YES labs done at [facility]    Have you been to an emergency room (ER) or urgent care clinic since your last visit? NO            Have you been hospitalized since your last visit? NO     If yes, where, when, and reason for visit? Have you seen or consulted any other health care providers outside of the [address] [place] [address]  since your last visit? YES [facility]      If yes, where, when, and reason for visit? HM deferred to pcp.

## 2018-03-19 NOTE — PATIENT INSTRUCTIONS
1. Continue current plan with no evidence of addiction or diversion. Stable on current medication without adverse events. 2. Refill hydrocodone 10/325 mg. Take half to 1 tablet up to 5 times daily as needed. 3. Consider long-acting medication later if needed. 4. Naloxone 4 mg nasal spray for opioid induced respiratory depression emergency only. 5. Discussed risks of addiction, dependency, and opioid induced hyperalgesia.    6. Return to clinic in 3 months

## 2018-03-29 DIAGNOSIS — F43.9 STRESS: ICD-10-CM

## 2018-03-30 ENCOUNTER — TELEPHONE (OUTPATIENT)
Dept: INTERNAL MEDICINE CLINIC | Age: 63
End: 2018-03-30

## 2018-03-30 RX ORDER — DIAZEPAM 10 MG/1
TABLET ORAL
Qty: 30 TAB | Refills: 1 | Status: SHIPPED | OUTPATIENT
Start: 2018-03-30 | End: 2018-06-21 | Stop reason: SDUPTHER

## 2018-04-19 RX ORDER — ATORVASTATIN CALCIUM 10 MG/1
TABLET, FILM COATED ORAL
Qty: 90 TAB | Refills: 3 | Status: SHIPPED | OUTPATIENT
Start: 2018-04-19 | End: 2019-04-16 | Stop reason: SDUPTHER

## 2018-04-27 DIAGNOSIS — E29.1 HYPOGONADISM IN MALE: Primary | ICD-10-CM

## 2018-04-30 RX ORDER — TESTOSTERONE CYPIONATE 100 MG/ML
100 INJECTION, SOLUTION INTRAMUSCULAR
Qty: 10 VIAL | Refills: 5 | OUTPATIENT
Start: 2018-04-30 | End: 2018-05-02 | Stop reason: SDUPTHER

## 2018-04-30 NOTE — TELEPHONE ENCOUNTER
From: Abraham Ruiz  To: Merlinda Posner, MD  Sent: 4/27/2018 8:25 PM EDT  Subject: Medication Renewal Request    Original authorizing provider: Merlinda Posner, MD [facility]  Paresh Becker would like a refill of the following medications:  DEPO-TESTOSTERONE 100 mg/mL injection Merlinda Posner, MD]    Preferred pharmacy: [address] [place] [address] Ave:

## 2018-05-02 DIAGNOSIS — E29.1 HYPOGONADISM IN MALE: ICD-10-CM

## 2018-05-03 RX ORDER — TESTOSTERONE CYPIONATE 100 MG/ML
INJECTION, SOLUTION INTRAMUSCULAR
Qty: 10 ML | Refills: 5 | Status: SHIPPED | OUTPATIENT
Start: 2018-05-03 | End: 2018-11-05 | Stop reason: SDUPTHER

## 2018-05-04 ENCOUNTER — TELEPHONE (OUTPATIENT)
Dept: INTERNAL MEDICINE CLINIC | Age: 63
End: 2018-05-04

## 2018-05-27 NOTE — PROGRESS NOTES
60 y.o. WHITE OR  male who presents for f/u    He continues to see pain mgmt and the meds continue to allow him to function with adls. He's active but no set exercise routine. Denied any cardiovascular complaints. He thinks the bp is high as he's got some pain from overdoing the lawn work yesterday     No GI or  complaints. Continues on testosterone replacement and he's happy w the results. Also happy with the [place] for the ED meds     No polyuria, polydipsia, nocturia, vision change, not checking sugars at this time.      Vitals 6/11/2018 3/19/2018 12/19/2017 12/1/2017 9/28/2017   Weight 192 lb 193 lb 193 lb 193 lb 191 lb     He's happy with the result of the bilat corneal transplants    Past Medical History:   Diagnosis Date    Chronic back pain     Dr. Alanna Cline Dyslipidemia     Erectile dysfunction     Fatty liver     US 2002, serologies negative; fib-4 1.06 from 3/14    FHx: colon cancer     H/O bone scan 3/14    negative outside of djd    HTN (hypertension)     Hypogonadism male     negative pituitary MRI 2013    Internal hemorrhoids 11/15    Dr Jerel Banks    Lower urinary tract symptoms (LUTS) 5/16    mild    Lumbago     MRI 1/14 and CT 3/14 w lumbar spinal stenosis severe    Lumbar post-laminectomy syndrome     Lumbar spinal stenosis     Prediabetes     2 hr gtt 124 from 10/11    Rosacea     S/P lumbar spinal fusion     Spasm of muscle     Status post lumbar laminectomy     Thoracic or lumbosacral neuritis or radiculitis, unspecified      Past Surgical History:   Procedure Laterality Date    CHEST SURGERY PROCEDURE UNLISTED      resection of a RUL lung lesion for recurring polyps 1980s    HX CATARACT REMOVAL  09/2014    Dr Villa Flatten right     HX COLONOSCOPY      negative Dr. Pedro Murrieta 2005, 2010; Dr Gadiel De La Cruz 11/15 hemorrhoids    Carmela Denise      Dr Opal Melendez 10/17, L 2/18    HX Janak Ohm  2012    Dr. Mylene Springer right buttock    HX LUMBAR LAMINECTOMY Social History     Social History    Marital status:      Spouse name: N/A    Number of children: 4    Years of education: N/A     Occupational History    sup [employer] works      Social History Main Topics    Smoking status: Former Smoker     Types: Cigarettes    Smokeless tobacco: Never Used    Alcohol use 0.0 oz/week      Comment: social    Drug use: No    Sexual activity: Not on file     Other Topics Concern    Not on file     Social History Narrative     Allergies   Allergen Reactions    Lisinopril Other (comments)     ED      Current Outpatient Prescriptions   Medication Sig    DEPO-TESTOSTERONE 100 mg/mL injection inject 100 milligrams intramuscularly every 10 days    atorvastatin (LIPITOR) 10 mg tablet take 1 tablet by mouth once daily    diazePAM (VALIUM) 10 mg tablet TAKE 1 TABLET BY MOUTH EVERY 6 HOURS AS NEEDED FOR ANXIETY    HYDROcodone-acetaminophen (NORCO)  mg tablet Take half to 1 tablet up to 5 times daily as needed for chronic, severe pain. Indications: Pain    vardenafil (LEVITRA) 20 mg tablet Take 20 mg by mouth as needed.  prednisoLONE acetate (PRED FORTE) 1 % ophthalmic suspension Administer 1 Drop to right eye four (4) times daily.  losartan-hydroCHLOROthiazide (HYZAAR) 100-25 mg per tablet take 1 tablet by mouth once daily    BD REGULAR BEVEL NEEDLES 18 gauge x 1 1/2\" ndle USE TO DRAW UP TESTOSTERONE AND THEN CHANGE NEEDLE TO GIVE TESTOSTERONE    naloxone 4 mg/actuation spry 4 mg by Nasal route once as needed (opioid overdose) for up to 1 dose. May substitute 2 mg syringe if insurance requires    Syringe, Disposable, 3 mL syrg Pt to use with Testosterone injections.  CIALIS 5 mg tablet take 1 tablet by mouth once daily    TENS Units georgie Use as directed    omega-3 fatty acids-vitamin e (FISH OIL) 1,000 mg Cap Take 1 Cap by mouth.  MULTIVITS/IRON FUM/FA/D3/LYCOP (MULTI FOR HIM PO) Take  by mouth.     HYDROcodone-acetaminophen (Janas Kihei)  mg tablet Take 0.5-1 Tabs by mouth every four (4) hours as needed for Pain (chronic) for up to 30 days. Indications: Pain     No current facility-administered medications for this visit. REVIEW OF SYSTEMS: colo 11/15 Dr Hamilton Barkley, sees Dr Foreign Montgomery no vision change or eye pain  Oral  no mouth pain, tongue or tooth problems  Ears  no hearing loss, ear pain, fullness, no swallowing problems  Cardiac  no CP, PND, orthopnea, edema, palpitations or syncope  Chest  no breast masses  Resp  no wheezing, chronic coughing, dyspnea  GI  no heartburn, nausea, vomiting, change in bowel habits, bleeding, hemorrhoids  Urinary  no dysuria, hematuria, flank pain, urgency, frequency  Endo - no polyuria, polydipsia, nocturia, hot flashes    Visit Vitals    /84    Pulse 73    Temp 98.4 °F (36.9 °C) (Oral)    Resp 14    Ht 5' 10\" (1.778 m)    Wt 192 lb (87.1 kg)    SpO2 98%    BMI 27.55 kg/m2     Affect is appropriate. Mood stable  No apparent distress  HEENT --Anicteric sclerae. No thyromegaly, JVD, or bruits. Lungs --Clear to auscultation, normal percussion. Heart --Regular rate and rhythm, no murmurs, rubs, gallops, or clicks. Chest wall --Nontender to palpation. PMI normal.  Abdomen -- Soft and nontender, no hepatosplenomegaly or masses. Rectal showed guaiac neg brown stool, normal tone. Prostate without asymmetry, nodularity, tenderness  Extremities -- Without cyanosis, clubbing, edema. 2+ pulses equally and bilaterally.     LABS  From 10/11 showed        hba1c 5.8, ldl-p 1008, chol 197, tg 200, hdl 45, ldl-c 112,                   psa 0.70, 2 hr   From 6/12  showed  gluc 104, cr 0.94, gfr 90,  alt 20, hba1c 5.7, ldl-p 2204, chol 197, tg 225, hdl 45, ldl-c 107,  tsh 0.62  From 11/12 showed gluc 100, cr 0.94, gfr 89                                ldl-p 1356                       test 316  From 5/13  showed  gluc 95,   cr 0.86                         hba1c 5.6, ldl-p 1183,  chol 147, tg 171, hdl 53, ldl-c 60,            test 313  From 5/13 showed                                test 196, lh 3.0, psa 0.60  From 11/13 showed        hba1c 5.5,     chol 164, tg 165, hdl 47, ldl-c 84  From 3/14 showed   gluc 91,   cr 0.83, gfr 96,  alt 42,      chol 151, tg 160, hdl 42, ldl-c 77,   wbc 7.7,   hb 15.2, plt 249,          psa 0.60, esr 2, spep neg, cea 0.9, crp 1.30, IL-6 1.2  From 5/14 showed   gluc 156, cr 1.02, gfr>60, alt 61,                 wbc 12.8, hb 14.6, plt 231, b12 722, fol>24(on pred)  From 10/14 showed gluc 101, cr 0.90, gfr 93,  alt 45, hba1c 5.7  From 5/15 showed        hba1c 5.7,     chol 155, tg 157, hdl 41, ldl-c 83,   wbc 6.6,   hb 14.6, plt 191, test 103,         psa 0.80  From 11/15 showed        hba1c 5.6,     chol 152, tg 159, hdl 49, ldl-c 81,          test 228  From 5/16 showed           chol 142, tg 118, hdl 38, ldl-c 80,   wbc 6.8,   hb 15.7, plt 203, test 395,         psa 0.9  From 11/16 showed gluc 103, cr 1.14, gfr>60, alt 73,                 test 346  From 5/17 showed           chol 139, tg 182, hdl 45, ldl-c 58,   wbc 7.2,   hb 15.7, plt 217,          psa 0.60, hep c neg  From 11/17 showed gluc 92,  cr 1.00l, gfr>60, alt 59, hba1c 5.4,               test 884    Results for orders placed or performed during the hospital encounter of 06/04/18   LIPID PANEL   Result Value Ref Range    LIPID PROFILE          Cholesterol, total 153 <200 MG/DL    Triglyceride 163 (H) <150 MG/DL    HDL Cholesterol 44 40 - 60 MG/DL    LDL, calculated 76.4 0 - 100 MG/DL    VLDL, calculated 32.6 MG/DL    CHOL/HDL Ratio 3.5 0 - 5.0     CBC W/O DIFF   Result Value Ref Range    WBC 6.1 4.6 - 13.2 K/uL    RBC 4.94 4.70 - 5.50 M/uL    HGB 15.3 13.0 - 16.0 g/dL    HCT 44.2 36.0 - 48.0 %    MCV 89.5 74.0 - 97.0 FL    MCH 31.0 24.0 - 34.0 PG    MCHC 34.6 31.0 - 37.0 g/dL    RDW 13.1 11.6 - 14.5 %    PLATELET 978 729 - 227 K/uL    MPV 11.0 9.2 - 11.8 FL   PSA, DIAGNOSTIC (PROSTATE SPECIFIC AG)   Result Value Ref Range    Prostate Specific Ag 0.7 0.0 - 4.0 ng/mL   TESTOSTERONE, TOTAL, ADULT MALE   Result Value Ref Range    Testosterone 392 264 - 916 ng/dL     Patient Active Problem List   Diagnosis Code    Dyslipidemia E78.5    S/P lumbar spinal fusion Z98.1    Hypogonadism male E29.1    Erectile dysfunction N52.9    IFG (impaired fasting glucose) R73.01    Primary hypertension I10    Chronic pain syndrome G89.4    Spondylosis of lumbar region without myelopathy or radiculopathy M47.816    Encounter for long-term (current) drug use Z79.899    Lumbar radiculitis M54.16    Overweight (BMI 25.0-29. 9) E66.3     ASSESSMENT AND PLAN:  1. Hypertension. Continue current regimen. Follow bp outside and call in readings so we can adjust the dosing as indicated  2. Fatty liver. Wt loss, exercise, abstinence reiterated  3. Dyslipidemia. Continue current regimen. 4.  FH colon ca. F/U colo 2020  5. Prediabetes. Doing well  6. ED. Continue prn levitra  7. Hypogonadism. Continue current regimen. 8.  Chronic pain. F/U pain clinic  9. Overweight. Lifestyle and dietary measures. Portion control reiterated. RTC 12/18    Above conditions discussed at length and patient vocalized understanding.   All questions answered to patient satisfaction

## 2018-06-04 ENCOUNTER — HOSPITAL ENCOUNTER (OUTPATIENT)
Dept: LAB | Age: 63
Discharge: HOME | End: 2018-06-04
Payer: COMMERCIAL

## 2018-06-04 DIAGNOSIS — E78.5 DYSLIPIDEMIA: ICD-10-CM

## 2018-06-04 DIAGNOSIS — E29.1 HYPOGONADISM MALE: ICD-10-CM

## 2018-06-04 LAB
CHOLEST SERPL-MCNC: 153 MG/DL
ERYTHROCYTE [DISTWIDTH] IN BLOOD BY AUTOMATED COUNT: 13.1 % (ref 11.6–14.5)
HCT VFR BLD AUTO: 44.2 % (ref 36–48)
HDLC SERPL-MCNC: 44 MG/DL (ref 40–60)
HDLC SERPL: 3.5 {RATIO} (ref 0–5)
HGB BLD-MCNC: 15.3 G/DL (ref 13–16)
LDLC SERPL CALC-MCNC: 76.4 MG/DL (ref 0–100)
LIPID PROFILE,FLP: ABNORMAL
MCH RBC QN AUTO: 31 PG (ref 24–34)
MCHC RBC AUTO-ENTMCNC: 34.6 G/DL (ref 31–37)
MCV RBC AUTO: 89.5 FL (ref 74–97)
PLATELET # BLD AUTO: 206 K/UL (ref 135–420)
PMV BLD AUTO: 11 FL (ref 9.2–11.8)
PSA SERPL-MCNC: 0.7 NG/ML (ref 0–4)
RBC # BLD AUTO: 4.94 M/UL (ref 4.7–5.5)
TRIGL SERPL-MCNC: 163 MG/DL (ref ?–150)
VLDLC SERPL CALC-MCNC: 32.6 MG/DL
WBC # BLD AUTO: 6.1 K/UL (ref 4.6–13.2)

## 2018-06-04 PROCEDURE — 84153 ASSAY OF PSA TOTAL: CPT | Performed by: INTERNAL MEDICINE

## 2018-06-04 PROCEDURE — 36415 COLL VENOUS BLD VENIPUNCTURE: CPT | Performed by: INTERNAL MEDICINE

## 2018-06-04 PROCEDURE — 84403 ASSAY OF TOTAL TESTOSTERONE: CPT | Performed by: INTERNAL MEDICINE

## 2018-06-04 PROCEDURE — 85027 COMPLETE CBC AUTOMATED: CPT | Performed by: INTERNAL MEDICINE

## 2018-06-04 PROCEDURE — 80061 LIPID PANEL: CPT | Performed by: INTERNAL MEDICINE

## 2018-06-05 LAB — TESTOST SERPL-MCNC: 392 NG/DL (ref 264–916)

## 2018-06-11 ENCOUNTER — OFFICE VISIT (OUTPATIENT)
Dept: INTERNAL MEDICINE CLINIC | Age: 63
End: 2018-06-11

## 2018-06-11 VITALS
DIASTOLIC BLOOD PRESSURE: 84 MMHG | HEIGHT: 70 IN | BODY MASS INDEX: 27.49 KG/M2 | WEIGHT: 192 LBS | RESPIRATION RATE: 14 BRPM | SYSTOLIC BLOOD PRESSURE: 148 MMHG | HEART RATE: 73 BPM | OXYGEN SATURATION: 98 % | TEMPERATURE: 98.4 F

## 2018-06-11 DIAGNOSIS — E29.1 HYPOGONADISM MALE: ICD-10-CM

## 2018-06-11 DIAGNOSIS — G89.4 CHRONIC PAIN SYNDROME: ICD-10-CM

## 2018-06-11 DIAGNOSIS — Z00.00 PHYSICAL EXAM: Primary | ICD-10-CM

## 2018-06-11 DIAGNOSIS — N52.9 ERECTILE DYSFUNCTION, UNSPECIFIED ERECTILE DYSFUNCTION TYPE: ICD-10-CM

## 2018-06-11 DIAGNOSIS — Z98.1 S/P LUMBAR SPINAL FUSION: ICD-10-CM

## 2018-06-11 DIAGNOSIS — I10 ESSENTIAL HYPERTENSION: ICD-10-CM

## 2018-06-11 DIAGNOSIS — R73.01 IFG (IMPAIRED FASTING GLUCOSE): ICD-10-CM

## 2018-06-11 DIAGNOSIS — E78.5 DYSLIPIDEMIA: ICD-10-CM

## 2018-06-11 PROBLEM — E66.3 OVERWEIGHT (BMI 25.0-29.9): Status: ACTIVE | Noted: 2018-06-11

## 2018-06-11 NOTE — PROGRESS NOTES
1. Have you been to the ER, urgent care clinic or hospitalized since your last visit? NO.     2. Have you seen or consulted any other health care providers outside of the [address] [place] [address] since your last visit (Include any pap smears or colon screening)? NO      Do you have an Advanced Directive? NO    Would you like information on Advanced Directives?  NO      Chief Complaint   Patient presents with    Physical     with labs

## 2018-06-11 NOTE — MR AVS SNAPSHOT
[address] [facility] 
 
 
 [address] [facility] 
[phone] Patient: Hong Duncan MRN:  [id number] Visit Information Date & Time Provider Department Dept. Phone Encounter #  
 6/11/2018  8:20 Vanesa Dillon MD [facility] [phone] [id number] Your Appointments 6/18/2018  9:40 AM  
Follow Up with NICOLE Soares [address] [place] [address] for Pain Management (GABY SCHEDULING) Appt Note: Return in about 3 months (around 6/19/2018). [address] [facility] [address]  
[phone] [address]  
  
    
 12/10/2018  8:00 AM  
Office Visit with Paola Conroy MD  
[facility] [address] [facility]) Appt Note: ov [address]  
 [address] [facility], [address] [facility] [address] [address] [place]  
  
   
 [address], [address] Upcoming Health Maintenance Date Due Influenza Age 5 to Adult 8/1/2018 COLONOSCOPY 11/16/2020 DTaP/Tdap/Td series (2 - Td) 11/14/2026 Allergies as of 6/11/2018  Review Complete On: 6/11/2018 By: Bran Dominguez LPN Severity Noted Reaction Type Reactions Lisinopril    Other (comments) ED Current Immunizations  Reviewed on 12/2/2017 Name Date H1N1 FLU VACCINE 11/11/2009 Influenza Vaccine 11/1/2017 Influenza Vaccine (Quad) PF 11/14/2016, 11/4/2015 Influenza Vaccine Split 11/21/2012 Influenza Vaccine Whole 10/28/2009 Tdap 11/14/2016 Zoster 7/6/2012  9:12 AM  
  
 Not reviewed this visit Vitals BP Pulse Temp Resp Height(growth percentile) Weight(growth percentile) 148/84 73 98.4 °F (36.9 °C) (Oral) 14 5' 10\" (1.778 m) 192 lb (87.1 kg) SpO2 BMI Smoking Status 98% 27.55 kg/m2 Former Smoker Vitals History BMI and BSA Data  Body Mass Index Body Surface Area  
 27.55 kg/m 2 2.07 m 2  
 Preferred Pharmacy Pharmacy Name Phone [address], [address] [place] [address] [phone] Your Updated Medication List  
  
   
This list is accurate as of 6/11/18  8:54 AM.  Always use your most recent med list.  
  
  
  
  
 atorvastatin 10 mg tablet Commonly known as:  LIPITOR  
take 1 tablet by mouth once daily BD REGULAR BEVEL NEEDLES 18 gauge x 1 1/2\" Ndle Generic drug:  Needle (Disp) 18 G  
USE TO DRAW UP TESTOSTERONE AND THEN CHANGE NEEDLE TO GIVE TESTOSTERONE  
  
 CIALIS 5 mg tablet Generic drug:  tadalafil  
take 1 tablet by mouth once daily DEPO-TESTOSTERONE 100 mg/mL injection Generic drug:  testosterone cypionate  
inject 100 milligrams intramuscularly every 10 days  
  
 diazePAM 10 mg tablet Commonly known as:  VALIUM  
TAKE 1 TABLET BY MOUTH EVERY 6 HOURS AS NEEDED FOR ANXIETY FISH OIL 1,000 mg Cap Generic drug:  omega-3 fatty acids-vitamin e Take 1 Cap by mouth.  
  
 * HYDROcodone-acetaminophen  mg tablet Commonly known as:  Fredis Alvarez Take 0.5-1 Tabs by mouth every four (4) hours as needed for Pain (chronic) for up to 30 days. Indications: Pain  
  
 * HYDROcodone-acetaminophen  mg tablet Commonly known as:  Fredis Alvarez Take half to 1 tablet up to 5 times daily as needed for chronic, severe pain. Indications: Pain  
  
 losartan-hydroCHLOROthiazide 100-25 mg per tablet Commonly known as:  HYZAAR  
take 1 tablet by mouth once daily MULTI FOR HIM PO Take  by mouth.  
  
 naloxone 4 mg/actuation nasal spray Commonly known as:  NARCAN  
4 mg by Nasal route once as needed (opioid overdose) for up to 1 dose. May substitute 2 mg syringe if insurance requires  
  
 prednisoLONE acetate 1 % ophthalmic suspension Commonly known as:  PRED FORTE Administer 1 Drop to right eye four (4) times daily. Syringe (Disposable) 3 mL Syrg Pt to use with Testosterone injections. TENS Units Pawel Ha Use as directed  
  
 vardenafil 20 mg tablet Commonly known as:  LEVITRA Take 20 mg by mouth as needed. * Notice: This list has 2 medication(s) that are the same as other medications prescribed for you. Read the directions carefully, and ask your doctor or other care provider to review them with you. Introducing [facility] & [facility] SERVICES! Dear Abraham: Thank you for requesting a [employer] account. Our records indicate that you already have an active [employer] account. You can access your account anytime at [email or web address] Did you know that you can access your hospital and ER discharge instructions at any time in [employer]? You can also review all of your test results from your hospital stay or ER visit. Additional Information If you have questions, please visit the Frequently Asked Questions section of the [employer] website at [email or web address] Remember, [employer] is NOT to be used for urgent needs. For medical emergencies, dial 911. Now available from your iPhone and Android! Please provide this summary of care documentation to your next provider. Your primary care clinician is listed as Nava Harris. If you have any questions after today's visit, please call [phone].

## 2018-06-18 ENCOUNTER — OFFICE VISIT (OUTPATIENT)
Dept: PAIN MANAGEMENT | Age: 63
End: 2018-06-18

## 2018-06-18 VITALS
RESPIRATION RATE: 16 BRPM | WEIGHT: 192 LBS | BODY MASS INDEX: 27.49 KG/M2 | HEART RATE: 76 BPM | DIASTOLIC BLOOD PRESSURE: 82 MMHG | HEIGHT: 70 IN | SYSTOLIC BLOOD PRESSURE: 157 MMHG | TEMPERATURE: 97 F

## 2018-06-18 DIAGNOSIS — M47.816 SPONDYLOSIS OF LUMBAR REGION WITHOUT MYELOPATHY OR RADICULOPATHY: ICD-10-CM

## 2018-06-18 DIAGNOSIS — G89.4 CHRONIC PAIN SYNDROME: ICD-10-CM

## 2018-06-18 DIAGNOSIS — M54.16 LUMBAR RADICULITIS: ICD-10-CM

## 2018-06-18 RX ORDER — HYDROCODONE BITARTRATE AND ACETAMINOPHEN 10; 325 MG/1; MG/1
TABLET ORAL
Qty: 150 TAB | Refills: 0 | Status: SHIPPED | OUTPATIENT
Start: 2018-06-18 | End: 2019-12-02

## 2018-06-18 RX ORDER — HYDROCODONE BITARTRATE AND ACETAMINOPHEN 10; 325 MG/1; MG/1
TABLET ORAL
Qty: 150 TAB | Refills: 0 | Status: SHIPPED | OUTPATIENT
Start: 2018-06-18 | End: 2018-08-22 | Stop reason: SDUPTHER

## 2018-06-18 RX ORDER — HYDROCODONE BITARTRATE AND ACETAMINOPHEN 10; 325 MG/1; MG/1
TABLET ORAL
Qty: 150 TAB | Refills: 0 | Status: SHIPPED | OUTPATIENT
Start: 2018-06-18 | End: 2018-07-25 | Stop reason: SDUPTHER

## 2018-06-18 NOTE — MR AVS SNAPSHOT
[address] [facility] [address] 
[phone] Patient: Catie Aguirre MRN:  [id number] Visit Information Date & Time Provider Department Dept. Phone Encounter #  
 6/18/2018  9:40 AM NICOLE Botello [facility] [id number] [phone] Follow-up Instructions Return in about 3 months (around 9/18/2018). Your Appointments 12/10/2018  8:00 AM  
Office Visit with Laura Steele MD  
[facility]) Appt Note: ov 6mos. rd  
 [address], [address] [place] [address]  
  
   
 [address], [address] Upcoming Health Maintenance Date Due Influenza Age 5 to Adult 8/1/2018 COLONOSCOPY 11/16/2020 DTaP/Tdap/Td series (2 - Td) 11/14/2026 Allergies as of 6/18/2018  Review Complete On: 6/18/2018 By: NICOLE Botello Severity Noted Reaction Type Reactions Lisinopril    Other (comments) ED Current Immunizations  Reviewed on 12/2/2017 Name Date H1N1 FLU VACCINE 11/11/2009 Influenza Vaccine 11/1/2017 Influenza Vaccine (Quad) PF 11/14/2016, 11/4/2015 Influenza Vaccine Split 11/21/2012 Influenza Vaccine Whole 10/28/2009 Tdap 11/14/2016 Zoster 7/6/2012  9:12 AM  
  
 Not reviewed this visit You Were Diagnosed With   
  
 Codes Comments Chronic pain syndrome     ICD-10-CM: G89.4 ICD-9-CM: 338.4 Spondylosis of lumbar region without myelopathy or radiculopathy     ICD-10-CM: M47.816 ICD-9-CM: 721.3 Lumbar radiculitis     ICD-10-CM: M54.16 
ICD-9-CM: 724.4 Vitals BP Pulse Temp Resp Height(growth percentile) Weight(growth percentile) 157/82 (BP 1 Location: Right arm, BP Patient Position: Sitting) 76 97 °F (36.1 °C) 16 5' 10\" (1.778 m) 192 lb (87.1 kg) BMI Smoking Status 27.55 kg/m2 Former Smoker BMI and BSA Data Body Mass Index Body Surface Area  
 27.55 kg/m 2 2.07 m 2 Preferred Pharmacy Pharmacy Name Phone [address], [address] [place] [address] [phone] Your Updated Medication List  
  
   
This list is accurate as of 6/18/18 11:00 AM.  Always use your most recent med list.  
  
  
  
  
 atorvastatin 10 mg tablet Commonly known as:  LIPITOR  
take 1 tablet by mouth once daily BD REGULAR BEVEL NEEDLES 18 gauge x 1 1/2\" Ndle Generic drug:  Needle (Disp) 18 G  
USE TO DRAW UP TESTOSTERONE AND THEN CHANGE NEEDLE TO GIVE TESTOSTERONE  
  
 CIALIS 5 mg tablet Generic drug:  tadalafil  
take 1 tablet by mouth once daily DEPO-TESTOSTERONE 100 mg/mL injection Generic drug:  testosterone cypionate  
inject 100 milligrams intramuscularly every 10 days  
  
 diazePAM 10 mg tablet Commonly known as:  VALIUM  
TAKE 1 TABLET BY MOUTH EVERY 6 HOURS AS NEEDED FOR ANXIETY FISH OIL 1,000 mg Cap Generic drug:  omega-3 fatty acids-vitamin e Take 1 Cap by mouth.  
  
 * HYDROcodone-acetaminophen  mg tablet Commonly known as:  [place] Take 0.5-1 Tabs by mouth every four (4) hours as needed for Pain (chronic) for up to 30 days. Indications: Pain  
  
 * HYDROcodone-acetaminophen  mg tablet Commonly known as:  [place] Take half to 1 tablet up to 5 times daily as needed for chronic, severe pain. Indications: Pain  
  
 * HYDROcodone-acetaminophen  mg tablet Commonly known as:  [place] Take half to 1 tablet up to 5 times daily as needed for chronic, severe pain. Indications: Pain  
  
 * HYDROcodone-acetaminophen  mg tablet Commonly known as:  [place] Take half to 1 tablet up to 5 times daily as needed for chronic, severe pain. Indications: Pain  
  
 losartan-hydroCHLOROthiazide 100-25 mg per tablet Commonly known as:  HYZAAR  
take 1 tablet by mouth once daily MULTI FOR HIM PO Take  by mouth.  
  
 naloxone 4 mg/actuation nasal spray Commonly known as:  NARCAN  
4 mg by Nasal route once as needed (opioid overdose) for up to 1 dose. May substitute 2 mg syringe if insurance requires  
  
 prednisoLONE acetate 1 % ophthalmic suspension Commonly known as:  PRED FORTE Administer 1 Drop to right eye four (4) times daily. Syringe (Disposable) 3 mL Syrg Pt to use with Testosterone injections. TENS Units Pamela López Use as directed  
  
 vardenafil 20 mg tablet Commonly known as:  LEVITRA Take 20 mg by mouth as needed. * Notice: This list has 4 medication(s) that are the same as other medications prescribed for you. Read the directions carefully, and ask your doctor or other care provider to review them with you. Prescriptions Printed Refills HYDROcodone-acetaminophen (NORCO)  mg tablet 0 Sig: Take half to 1 tablet up to 5 times daily as needed for chronic, severe pain. Indications: Pain Class: Print HYDROcodone-acetaminophen (NORCO)  mg tablet 0 Sig: Take half to 1 tablet up to 5 times daily as needed for chronic, severe pain. Indications: Pain Class: Print HYDROcodone-acetaminophen (NORCO)  mg tablet 0 Sig: Take half to 1 tablet up to 5 times daily as needed for chronic, severe pain. Indications: Pain Class: Print Follow-up Instructions Return in about 3 months (around 9/18/2018). Patient Instructions 1. Continue current plan with no evidence of addiction or diversion. Stable on current medication without adverse events. 2. Refill hydrocodone 10/325 mg. Take half to 1 tablet up to 5 times daily as needed. 3. Please discuss alternative medications regarding Valium with your PCP as soon as possible as we were no longer be able to prescribe opioid medications while you are concurrently using benzodiazepines. 4. Naloxone 4 mg nasal spray for opioid induced respiratory depression emergency only. 5. Discussed risks of addiction, dependency, and opioid induced hyperalgesia. Please remember to call at least 4-5 business days prior to your medication refill. Return to clinic in 3 months. Please call and cancel your appointment and pain management agreement if you do decide to transfer your care. Introducing [facility] & [facility] SERVICES! Dear Abraham: Thank you for requesting a [employer] account. Our records indicate that you already have an active [employer] account. You can access your account anytime at [email or web address]. [email or web address] Did you know that you can access your hospital and ER discharge instructions at any time in [employer]? You can also review all of your test results from your hospital stay or ER visit. Additional Information If you have questions, please visit the Frequently Asked Questions section of the [employer] website at [email or web address] Remember, [employer] is NOT to be used for urgent needs. For medical emergencies, dial 911. Now available from your iPhone and Android! Please provide this summary of care documentation to your next provider. Your primary care clinician is listed as Rosemarie Tao. If you have any questions after today's visit, please call [phone].

## 2018-06-18 NOTE — PATIENT INSTRUCTIONS
1. Continue current plan with no evidence of addiction or diversion. Stable on current medication without adverse events. 2. Refill hydrocodone 10/325 mg. Take half to 1 tablet up to 5 times daily as needed. 3. Please discuss alternative medications regarding Valium with your PCP as soon as possible as we were no longer be able to prescribe opioid medications while you are concurrently using benzodiazepines. 4. Naloxone 4 mg nasal spray for opioid induced respiratory depression emergency only. 5. Discussed risks of addiction, dependency, and opioid induced hyperalgesia. Please remember to call at least 4-5 business days prior to your medication refill. Return to clinic in 3 months. Please call and cancel your appointment and pain management agreement if you do decide to transfer your care.

## 2018-06-18 NOTE — PROGRESS NOTES
Nursing Notes    Patient presents to the office today in follow-up. Patient rates his pain at 2/10 on the numerical pain scale. Reviewed medications with counts as follows:    Rx Date filled Qty Dispensed Pill Count Last Dose Short   Norco 10 mg 06/01/18 150 63 This am no         Comments: Patient is here today for a follow up appt today he states his pain level today is a 2  He states he has seen his pcm and labs were taken there . POC UDS was not performed in office today    Any new labs or imaging since last appointment? YES    Have you been to an emergency room (ER) or urgent care clinic since your last visit? NO            Have you been hospitalized since your last visit? NO     If yes, where, when, and reason for visit? Have you seen or consulted any other health care providers outside of the [address] [place] [address]  since your last visit? YES   pcm   If yes, where, when, and reason for visit? Mr. Bibiana Olea has a reminder for a \"due or due soon\" health maintenance. I have asked that he contact his primary care provider for follow-up on this health maintenance.

## 2018-06-18 NOTE — PROGRESS NOTES
HISTORY OF PRESENT ILLNESS  Abraham Dixon is a 60 y.o. male    HPI: Mr. Jan Dixon  returns today for f/u of chronic pain due to lumbar spondylosis, lumbar postlaminectomy syndrome, chronic right lower radiculopathy, and right hip osteoarthritis. H/o lumbar surgery with Dr. Parveen Sevilla 2014. Mr. Jan Dixon continues unchanged since last visit. He continues to do well with his current treatment plan which has been offering significant pain control. We discussed changes to our practice. I explained to him that moving forward we will be focusing on a more conservative and non-opioid plan of care. This will result in changes to his current treatment plan. I have agreed to continue with his hydrocodone today but he understands it we will begin tapering his hydrocodone next visit. He currently receives Valium from his PCP. I have asked him to discuss alternative treatments regarding Valium as soon as possible as we were no longer be able to prescribe opioids while he is concurrently using benzodiazepine. He verbally agrees. He did express his concerns with these changes and states that he most likely will be transferring his care. I have asked him to schedule appointment in 3 months and to call and cancel his appointment and pain management agreement if he does decide to transfer his care. Current medication management includes hydrocodone 10/325 mg 5/day as needed. Receives Valium from outside provider. Medications are helping with pain control and quality of life. His pain is 1-2/10 with medication and 7-8/10 without. Pt describes pain as aching. Current treatment is helping to improve general activity, mood, walking, sleep, enjoyment of life    Mr. Jan Dixon is tolerating medications well, with no side effects noted. He is able to stay more active with less discomfort with these current doses. The patient reports an average of 75% pain relief with current treatment/medications.    He is informed of side effects, risks, and benefits of this regimen, and emphasizes that he derives a significant improvement in functionality and quality of life, and notes that non-opioid medications and therapies in the past have not offered significant benefit. He  is otherwise doing well with no other complaints today. He denies any adverse events including nausea, vomiting, dizziness, increased constipation, hallucinations, or seizures. Because the patient's current regimen places him/her at increased risk for possible overdose, a prescription for naloxone nasal spray has been provided. The patient understands that this medication is only to be used in the setting of a possible overdose and that inadvertent use of this medication could precipitate overt withdrawal.    MME: 50  COMM: 3  OSWESTRY: 46%  Last UDS reviewed           Allergies   Allergen Reactions    Lisinopril Other (comments)     ED       Past Surgical History:   Procedure Laterality Date    CHEST SURGERY PROCEDURE UNLISTED      resection of a RUL lung lesion for recurring polyps 1980s    HX CATARACT REMOVAL  09/2014    Dr Micky Caballero right     HX COLONOSCOPY      negative Dr. Janelle Mariano 2005, 2010; Dr Chantal Seth 11/15 hemorrhoids    Em Scott      Dr Ehsan Kee 10/17, L 2/18    HX Josephine Finch  2012    Dr. Sukhdev Lynch right buttock    HX LUMBAR LAMINECTOMY           Review of Systems   Constitutional: Negative for chills and fever. HENT: Negative for congestion and sore throat. Eyes: Negative for blurred vision and double vision. Respiratory: Negative for cough, shortness of breath and wheezing. Cardiovascular: Negative for chest pain and palpitations. Gastrointestinal: Negative for abdominal pain, constipation, diarrhea, heartburn, nausea and vomiting. Genitourinary: Negative. Musculoskeletal: Positive for back pain and neck pain. Negative for joint pain. Neurological: Negative for dizziness, seizures and loss of consciousness. Endo/Heme/Allergies: Does not bruise/bleed easily. Psychiatric/Behavioral: Negative for depression. The patient is not nervous/anxious and does not have insomnia. Physical Exam   Constitutional: He is oriented to person, place, and time and well-developed, well-nourished, and in no distress. No distress. HENT:   Head: Normocephalic and atraumatic. Eyes: EOM are normal.   Pulmonary/Chest: Effort normal.   Neurological: He is alert and oriented to person, place, and time. Skin: Skin is dry. No rash noted. No erythema. Psychiatric: Mood, memory, affect and judgment normal.   Nursing note and vitals reviewed. ASSESSMENT:    1. Chronic pain syndrome    2. Spondylosis of lumbar region without myelopathy or radiculopathy    3. Lumbar radiculitis           [place] Prescription Monitoring Program was reviewed which does not demonstrate aberrancies and/or inconsistencies with regard to the historical prescribing of controlled medications to this patient by other providers. PLAN / Pt Instructions:  1. Continue current plan with no evidence of addiction or diversion. Stable on current medication without adverse events. 2. Refill hydrocodone 10/325 mg. Take half to 1 tablet up to 5 times daily as needed. 3. Please discuss alternative medications regarding Valium with your PCP as soon as possible as we were no longer be able to prescribe opioid medications while you are concurrently using benzodiazepines. 4. Naloxone 4 mg nasal spray for opioid induced respiratory depression emergency only. 5. Discussed risks of addiction, dependency, and opioid induced hyperalgesia. Please remember to call at least 4-5 business days prior to your medication refill. Return to clinic in 3 months. Please call and cancel your appointment and pain management agreement if you do decide to transfer your care.         Medications Ordered Today   Medications    HYDROcodone-acetaminophen (NORCO)  mg tablet Sig: Take half to 1 tablet up to 5 times daily as needed for chronic, severe pain. Indications: Pain     Dispense:  150 Tab     Refill:  0    HYDROcodone-acetaminophen (NORCO)  mg tablet     Sig: Take half to 1 tablet up to 5 times daily as needed for chronic, severe pain. Indications: Pain     Dispense:  150 Tab     Refill:  0    HYDROcodone-acetaminophen (NORCO)  mg tablet     Sig: Take half to 1 tablet up to 5 times daily as needed for chronic, severe pain. Indications: Pain     Dispense:  150 Tab     Refill:  0           DISPOSITION     Pain medications are prescribed with the objective of pain relief and improved physical and psychosocial function in this patient.  Pain Meds and Quality Of Life have been reviewed. Nonpharmacologic therapy and non-opioid pharmacologic therapy have been considered. Opioid therapy is only prescribed if the expected benefits are anticipated to outweigh risks.  Counseled patient on proper use of prescribed medications.  Reviewed with patient self-help tools, home exercise, and lifestyle changes to assist the patient in self-management of symptoms.  Reviewed with patient the treatment plan, goals of treatment plan, and limitations of treatment plan, to include the potential for side effects from medications and procedures. If side effects occur, it is the responsibility of the patient to inform the clinic so that a change in the treatment plan can be made in a safe manner. The patient is advised that stopping prescribed medication may cause an increase in symptoms and possible medication withdrawal symptoms. The patient is informed an emergency room evaluation may be necessary if this occurs. Spent 25 minutes with patient today which more than 50% of that time was spent on counseling and coordination of care. Gail Beal 6/18/2018        Note: Please excuse any typographical errors.  Voice recognition software was used for this note and may cause mistakes.

## 2018-06-21 DIAGNOSIS — F43.9 STRESS: ICD-10-CM

## 2018-06-22 RX ORDER — DIAZEPAM 10 MG/1
10 TABLET ORAL
Qty: 30 TAB | Refills: 1 | OUTPATIENT
Start: 2018-06-22 | End: 2018-09-17 | Stop reason: SDUPTHER

## 2018-06-22 NOTE — TELEPHONE ENCOUNTER
From: Abraham Avendano  To: Amy Su MD  Sent: 6/21/2018 8:27 PM EDT  Subject: Medication Renewal Request    Original authorizing provider: MD Abraham Durham MATT Mazariegosmartinez would like a refill of the following medications:  diazePAM (VALIUM) 10 mg tablet Amy Su MD]    Preferred pharmacy: [address] [place] [address] Ave:

## 2018-06-27 RX ORDER — SYRINGE, DISPOSABLE, 3 ML
SYRINGE, EMPTY DISPOSABLE MISCELLANEOUS
Qty: 25 SYRINGE | Refills: 1 | Status: SHIPPED | OUTPATIENT
Start: 2018-06-27

## 2018-06-27 NOTE — TELEPHONE ENCOUNTER
From: Abraham Paige  To: Laura Steele MD  Sent: 6/26/2018 6:31 PM EDT  Subject: Medication Renewal Request    Original authorizing provider: MD Abraham Barraganbrandon  would like a refill of the following medications:  Syringe, Disposable, 3 mL syrg Laura Steele MD]    Preferred pharmacy: [place] [id number], [address]:

## 2018-06-29 RX ORDER — NEEDLES, DISPOSABLE 25GX5/8"
NEEDLE, DISPOSABLE MISCELLANEOUS
Qty: 25 EACH | Refills: 1 | Status: SHIPPED | OUTPATIENT
Start: 2018-06-29 | End: 2018-11-06 | Stop reason: SDUPTHER

## 2018-07-25 ENCOUNTER — OFFICE VISIT (OUTPATIENT)
Dept: PAIN MANAGEMENT | Age: 63
End: 2018-07-25

## 2018-07-25 VITALS
HEIGHT: 70 IN | RESPIRATION RATE: 14 BRPM | TEMPERATURE: 98.9 F | WEIGHT: 192 LBS | DIASTOLIC BLOOD PRESSURE: 94 MMHG | SYSTOLIC BLOOD PRESSURE: 175 MMHG | HEART RATE: 77 BPM | BODY MASS INDEX: 27.49 KG/M2

## 2018-07-25 DIAGNOSIS — M54.16 LUMBAR RADICULITIS: ICD-10-CM

## 2018-07-25 DIAGNOSIS — M47.816 SPONDYLOSIS OF LUMBAR REGION WITHOUT MYELOPATHY OR RADICULOPATHY: ICD-10-CM

## 2018-07-25 DIAGNOSIS — G89.4 CHRONIC PAIN SYNDROME: ICD-10-CM

## 2018-07-25 DIAGNOSIS — G89.4 CHRONIC PAIN SYNDROME: Primary | ICD-10-CM

## 2018-07-25 RX ORDER — HYDROCODONE BITARTRATE AND ACETAMINOPHEN 10; 325 MG/1; MG/1
TABLET ORAL
Qty: 150 TAB | Refills: 0 | Status: SHIPPED | OUTPATIENT
Start: 2018-07-28 | End: 2018-09-17 | Stop reason: SDUPTHER

## 2018-07-25 NOTE — TELEPHONE ENCOUNTER
Nursing Notes    Patient presents to the office today in follow-up. Patient rates his pain at 2/10 on the numerical pain scale. Reviewed medications with counts as follows:    Rx Date filled Qty Dispensed Pill Count Last Dose Short   Norco  mg tab 6/29/18 150 20 today no                                        PHQ over the last two weeks 7/25/2018   Little interest or pleasure in doing things Not at all   Feeling down, depressed, irritable, or hopeless Not at all   Total Score PHQ 2 0         Comments:     Patient BP elevated this visit. 175/94. Patient denies chest pain, SOB, or difficulty breathing. Patient states they took their BP medication today, and is being monitored by another provider for their BP. Patient also states he will contact his provider regarding today's BP reading. Provider made aware and education provided to patient. POC UDS was not performed in office today    Any new labs or imaging since last appointment? NO    Have you been to an emergency room (ER) or urgent care clinic since your last visit? NO            Have you been hospitalized since your last visit? NO     If yes, where, when, and reason for visit? Have you seen or consulted any other health care providers outside of the [address] [place] [address]  since your last visit? NO     If yes, where, when, and reason for visit? Mr. Angela Arenas has a reminder for a \"due or due soon\" health maintenance. I have asked that he contact his primary care provider for follow-up on this health maintenance. Patient identity verified using 2 patient identifiers. Group education topic covered: Nurse Visit  Questions regarding group topic? None    Aberrances present on  report pulled? ( If so, please describe.)  no    Patient contact number: [phone]    What percentage of relief does the pain medication(s) provide? (Between 0-100) Patient states pain is relieved by 90%.     Are you able to perform your daily tasks when taking your medication? (Such as: Personal Hygiene, Cooking, Cleaning) : yes    Are you having any side effects from your medication? (Ex; nausea, vomiting,constipation) no  What relieves the side effects? N/A    Questions regarding one one one discussion?  None

## 2018-07-25 NOTE — PATIENT INSTRUCTIONS

## 2018-07-25 NOTE — PROGRESS NOTES
Nursing Notes    Patient presents to the office today in follow-up. Patient rates his pain at 2/10 on the numerical pain scale. Reviewed medications with counts as follows:    Rx Date filled Qty Dispensed Pill Count Last Dose Short   Norco  mg tab 6/29/18 150 20 today no                                        PHQ over the last two weeks 7/25/2018   Little interest or pleasure in doing things Not at all   Feeling down, depressed, irritable, or hopeless Not at all   Total Score PHQ 2 0         Comments:     Patient BP elevated this visit. 175/94. Patient denies chest pain, SOB, or difficulty breathing. Patient states they took their BP medication today, and is being monitored by another provider for their BP. Patient also states he will contact his provider regarding today's BP reading. Provider made aware and education provided to patient. POC UDS was not performed in office today    Any new labs or imaging since last appointment? NO    Have you been to an emergency room (ER) or urgent care clinic since your last visit? NO            Have you been hospitalized since your last visit? NO     If yes, where, when, and reason for visit? Have you seen or consulted any other health care providers outside of the [address] [place] [address]  since your last visit? NO     If yes, where, when, and reason for visit? Mr. Noelle Ibarra has a reminder for a \"due or due soon\" health maintenance. I have asked that he contact his primary care provider for follow-up on this health maintenance. Patient identity verified using 2 patient identifiers. Group education topic covered: Nurse Visit  Questions regarding group topic? None    Aberrances present on  report pulled? ( If so, please describe.)  no    Patient contact number: [phone]    What percentage of relief does the pain medication(s) provide? (Between 0-100) Patient states pain is relieved by 90%.     Are you able to perform your daily tasks when taking your medication? (Such as: Personal Hygiene, Cooking, Cleaning) : yes    Are you having any side effects from your medication? (Ex; nausea, vomiting,constipation) no  What relieves the side effects? N/A    Questions regarding one one one discussion?  None

## 2018-08-15 ENCOUNTER — TELEPHONE (OUTPATIENT)
Dept: PAIN MANAGEMENT | Age: 63
End: 2018-08-15

## 2018-08-15 NOTE — TELEPHONE ENCOUNTER
Patient called in for script refill 08/15/18 9:17am    1. Name,  verified  2.  norco  3   [phone]  4.  90  5.   Yes  6.  none

## 2018-08-22 ENCOUNTER — TELEPHONE (OUTPATIENT)
Dept: PAIN MANAGEMENT | Age: 63
End: 2018-08-22

## 2018-08-22 DIAGNOSIS — M47.816 SPONDYLOSIS OF LUMBAR REGION WITHOUT MYELOPATHY OR RADICULOPATHY: ICD-10-CM

## 2018-08-22 DIAGNOSIS — G89.4 CHRONIC PAIN SYNDROME: ICD-10-CM

## 2018-08-22 DIAGNOSIS — M54.16 LUMBAR RADICULITIS: ICD-10-CM

## 2018-08-22 RX ORDER — HYDROCODONE BITARTRATE AND ACETAMINOPHEN 10; 325 MG/1; MG/1
TABLET ORAL
Qty: 150 TAB | Refills: 0 | Status: SHIPPED | OUTPATIENT
Start: 2018-08-22 | End: 2018-09-17 | Stop reason: SDUPTHER

## 2018-08-22 NOTE — TELEPHONE ENCOUNTER
Abraham Leavitt has called requesting a refill of their controlled medication Norco for the management of chronic pain syndrome    Last office visit date: 06/18/18    Date last  was pulled and reviewed : 08/22/18    Was the patient compliant when the above report was pulled? yes    Analgesia: The patient states that he receives 90% of pain relief from the medication    Aberrancies: There are no aberrancies noted at this time     ADL's: The patient states he is able to perform his adls while on the  Medication     Adverse Reaction: There are no adverse reactions noted     Provider's last note and plan of care reviewed? yes  Request forwarded to provider for review.

## 2018-09-17 ENCOUNTER — OFFICE VISIT (OUTPATIENT)
Dept: PAIN MANAGEMENT | Age: 63
End: 2018-09-17

## 2018-09-17 VITALS
TEMPERATURE: 97 F | BODY MASS INDEX: 27.14 KG/M2 | WEIGHT: 189.6 LBS | SYSTOLIC BLOOD PRESSURE: 140 MMHG | OXYGEN SATURATION: 97 % | RESPIRATION RATE: 16 BRPM | HEART RATE: 76 BPM | DIASTOLIC BLOOD PRESSURE: 86 MMHG | HEIGHT: 70 IN

## 2018-09-17 DIAGNOSIS — Z79.899 ENCOUNTER FOR LONG-TERM (CURRENT) USE OF HIGH-RISK MEDICATION: Primary | ICD-10-CM

## 2018-09-17 DIAGNOSIS — M54.16 LUMBAR RADICULITIS: ICD-10-CM

## 2018-09-17 DIAGNOSIS — M47.816 SPONDYLOSIS OF LUMBAR REGION WITHOUT MYELOPATHY OR RADICULOPATHY: ICD-10-CM

## 2018-09-17 DIAGNOSIS — F43.9 STRESS: ICD-10-CM

## 2018-09-17 DIAGNOSIS — G89.4 CHRONIC PAIN SYNDROME: ICD-10-CM

## 2018-09-17 LAB
ALCOHOL UR POC: NORMAL
AMPHETAMINES UR POC: NEGATIVE
BARBITURATES UR POC: NORMAL
BENZODIAZEPINES UR POC: NORMAL
BUPRENORPHINE UR POC: NEGATIVE
CANNABINOIDS UR POC: NEGATIVE
CARISOPRODOL UR POC: NORMAL
COCAINE UR POC: NEGATIVE
FENTANYL UR POC: NORMAL
MDMA/ECSTASY UR POC: NORMAL
METHADONE UR POC: NEGATIVE
METHAMPHETAMINE UR POC: NORMAL
METHYLPHENIDATE UR POC: NORMAL
OPIATES UR POC: NEGATIVE
OXYCODONE UR POC: NORMAL
PHENCYCLIDINE UR POC: NORMAL
PROPOXYPHENE UR POC: NORMAL
TRAMADOL UR POC: NORMAL
TRICYCLICS UR POC: NORMAL

## 2018-09-17 RX ORDER — HYDROCODONE BITARTRATE AND ACETAMINOPHEN 10; 325 MG/1; MG/1
TABLET ORAL
Qty: 140 TAB | Refills: 0 | Status: SHIPPED | OUTPATIENT
Start: 2018-09-24 | End: 2019-07-09

## 2018-09-17 RX ORDER — HYDROCODONE BITARTRATE AND ACETAMINOPHEN 10; 325 MG/1; MG/1
TABLET ORAL
Qty: 130 TAB | Refills: 0 | Status: SHIPPED | OUTPATIENT
Start: 2018-10-23 | End: 2018-10-03 | Stop reason: SDUPTHER

## 2018-09-17 RX ORDER — HYDROCODONE BITARTRATE AND ACETAMINOPHEN 10; 325 MG/1; MG/1
TABLET ORAL
Qty: 120 TAB | Refills: 0 | Status: SHIPPED | OUTPATIENT
Start: 2018-11-22 | End: 2018-10-03 | Stop reason: SDUPTHER

## 2018-09-17 NOTE — PROGRESS NOTES
HISTORY OF PRESENT ILLNESS  Abraham Madden is a 60 y.o. male    HPI: Mr. Adia Madden  returns today for f/u of chronic pain due to lumbar spondylosis, lumbar postlaminectomy syndrome, chronic right lower radiculopathy, and right hip osteoarthritis. H/o lumbar surgery with Dr. Holly Jerez 2014 with good improvement. PT and lumbar injections with no help. Currently has a Tens unit with no help. Mr. Adia Madden continues unchanged since last visit. He continues to do well with his current treatment plan which has been offering significant pain control. We did have a lengthy conversation last visit regarding changes to our practice. We will continue tapering his hydrocodone as previously discussed. He has been doing well with his tapering plan so far. He is nervous about completely discontinuing hydrocodone and thinks that he may need to transfer his care but has not made a formal decision at this time. I have asked him to call and cancel his appointment and pain management agreement if he does decide to transfer his care. We did discuss some options in the office today. He has tried injections, physical therapy, and currently uses a TENS unit with no improvement. We did discuss further options moving forward. I will have him follow-up in 3 months for further evaluation and recommendation or sooner if needed. He does note that he has discontinued Valium and will continue to follow-up with his PCP regarding alternative treatments for Valium as well as treatment for sleep. Current medication management includes hydrocodone 10/325 mg 5/day as needed. Medications are helping with pain control and quality of life. His pain is 1-2/10 with medication and 7-8/10 without. Pt describes pain as aching. Current treatment is helping to improve general activity, mood, walking, sleep, enjoyment of life    Mr. Adia Madden is tolerating medications well, with no side effects noted.  He is able to stay more active with less discomfort with these current doses. The patient reports an average of 75% pain relief with current treatment/medications. He is informed of side effects, risks, and benefits of this regimen, and emphasizes that he derives a significant improvement in functionality and quality of life, and notes that non-opioid medications and therapies in the past have not offered significant benefit. He  is otherwise doing well with no other complaints today. He denies any adverse events including nausea, vomiting, dizziness, increased constipation, hallucinations, or seizures. Because the patient's current regimen places him/her at increased risk for possible overdose, a prescription for naloxone nasal spray has been provided. The patient understands that this medication is only to be used in the setting of a possible overdose and that inadvertent use of this medication could precipitate overt withdrawal.    MME: 50  COMM: 3  OSWESTRY: 46%  Last UDS reviewed         Allergies   Allergen Reactions    Lisinopril Other (comments)     ED       Past Surgical History:   Procedure Laterality Date    CHEST SURGERY PROCEDURE UNLISTED      resection of a RUL lung lesion for recurring polyps 1980s    HX CATARACT REMOVAL  09/2014    Dr Espinosa Res right     HX COLONOSCOPY      negative Dr. Rafiq Franco 2005, 2010; Dr Medellin Marking 11/15 hemorrhoids    Demi Lemme      Dr Mary Whatley 10/17, L 2/18    HX Rafaela Taylor  2012    Dr. Alesia Meredith right buttock    HX LUMBAR LAMINECTOMY           Review of Systems   Constitutional: Negative for chills and fever. HENT: Negative for congestion and sore throat. Eyes: Negative for blurred vision and double vision. Respiratory: Negative for cough, shortness of breath and wheezing. Cardiovascular: Negative for chest pain and palpitations. Gastrointestinal: Negative for abdominal pain, constipation, diarrhea, heartburn, nausea and vomiting. Genitourinary: Negative. Musculoskeletal: Positive for back pain and neck pain. Negative for joint pain. Neurological: Negative for dizziness, seizures and loss of consciousness. Endo/Heme/Allergies: Does not bruise/bleed easily. Psychiatric/Behavioral: Negative for depression. The patient is not nervous/anxious and does not have insomnia. Physical Exam   Constitutional: He is oriented to person, place, and time and well-developed, well-nourished, and in no distress. No distress. HENT:   Head: Normocephalic and atraumatic. Eyes: EOM are normal.   Pulmonary/Chest: Effort normal.   Musculoskeletal:        Lumbar back: He exhibits tenderness. Neurological: He is alert and oriented to person, place, and time. Skin: Skin is dry. No rash noted. No erythema. Psychiatric: Mood, memory, affect and judgment normal.   Nursing note and vitals reviewed. ASSESSMENT:    1. Encounter for long-term (current) use of high-risk medication    2. Chronic pain syndrome    3. Spondylosis of lumbar region without myelopathy or radiculopathy    4. Lumbar radiculitis           [place] Prescription Monitoring Program was reviewed which does not demonstrate aberrancies and/or inconsistencies with regard to the historical prescribing of controlled medications to this patient by other providers. PLAN / Pt Instructions:  1. Modify current plan with no evidence of addiction or diversion. Stable on current medication without adverse events. 2. Refill and adjust hydrocodone 10/325 mg up to 5 times daily as needed no more than #140 per month ×1 month, then adjust down to #130 per month ×1 month, then adjust down to 4 times daily as needed until next visit  3. Please discuss further sleep treatment with your PCP as soon as possible. Please remember to avoid benzodiazepines  4. Naloxone 4 mg nasal spray for opioid induced respiratory depression emergency only. 5. Discussed risks of addiction, dependency, and opioid induced hyperalgesia. Please remember to call at least 5 business days prior to your medication refill. Return to clinic in 3 months. Please call and cancel your appointment and pain management agreement if you do decide to transfer your care. Medications Ordered Today   Medications    HYDROcodone-acetaminophen (NORCO)  mg tablet     Sig: Take half to 1 tablet up to 4-5 times daily as needed, no more than #140 per month for chronic, severe pain. Indications: Pain     Dispense:  140 Tab     Refill:  0    HYDROcodone-acetaminophen (NORCO)  mg tablet     Sig: Take half to 1 tablet up to 4-5 times daily as needed for chronic, severe pain. Indications: Pain     Dispense:  130 Tab     Refill:  0    HYDROcodone-acetaminophen (NORCO)  mg tablet     Sig: Take half to 1 tablet up to 4 times daily as needed for chronic, severe pain. Indications: Pain     Dispense:  120 Tab     Refill:  0       DISPOSITION     Pain medications are prescribed with the objective of pain relief and improved physical and psychosocial function in this patient.  Pain Meds and Quality Of Life have been reviewed. Nonpharmacologic therapy and non-opioid pharmacologic therapy have been considered. Opioid therapy is only prescribed if the expected benefits are anticipated to outweigh risks.  Counseled patient on proper use of prescribed medications.  Reviewed with patient self-help tools, home exercise, and lifestyle changes to assist the patient in self-management of symptoms.  Reviewed with patient the treatment plan, goals of treatment plan, and limitations of treatment plan, to include the potential for side effects from medications and procedures. If side effects occur, it is the responsibility of the patient to inform the clinic so that a change in the treatment plan can be made in a safe manner.  The patient is advised that stopping prescribed medication may cause an increase in symptoms and possible medication withdrawal symptoms. The patient is informed an emergency room evaluation may be necessary if this occurs. Spent 25 minutes with patient today which more than 50% of that time was spent on counseling and coordination of care. Gail Suazo 9/17/2018        Note: Please excuse any typographical errors. Voice recognition software was used for this note and may cause mistakes.

## 2018-09-17 NOTE — MR AVS SNAPSHOT
[address] [facility] [address] 
[phone] Patient: Alina Purcell MRN:  [id number] Visit Information Date & Time Provider Department Dept. Phone Encounter #  
 9/17/2018  8:20 AM [facility] [address] [place] [address] for Pain Management [phone] Follow-up Instructions Return in about 3 months (around 12/17/2018). Your Appointments 11/12/2018 10:40 AM  
Follow Up with NICOLE Martinez [address] [place] [address] for Pain Management (GABY SCHEDULING) Appt Note: Return in about 3 months (around 11/13/2018 30 [facility] [address]  
[phone] [address]  
  
    
 12/10/2018  8:00 AM  
Office Visit with Dusty Zavala MD  
[facility]) Appt Note: ov 6mos. rd  
 [address] [facility], [address] [address] [address] [place]  
  
   
 [address], [address] Upcoming Health Maintenance Date Due Influenza Age 5 to Adult 8/1/2018 COLONOSCOPY 11/16/2020 DTaP/Tdap/Td series (2 - Td) 11/14/2026 Allergies as of 9/17/2018  Review Complete On: 9/17/2018 By: NICOLE Martinez Severity Noted Reaction Type Reactions Lisinopril    Other (comments) ED Current Immunizations  Reviewed on 12/2/2017 Name Date H1N1 FLU VACCINE 11/11/2009 Influenza Vaccine 11/1/2017 Influenza Vaccine (Quad) PF 11/14/2016, 11/4/2015 Influenza Vaccine Split 11/21/2012 Influenza Vaccine Whole 10/28/2009 Tdap 11/14/2016 Zoster 7/6/2012  9:12 AM  
  
 Not reviewed this visit You Were Diagnosed With   
  
 Codes Comments Encounter for long-term (current) use of high-risk medication    -  Primary ICD-10-CM: R30.398 ICD-9-CM: V58.69 Chronic pain syndrome     ICD-10-CM: G89.4 ICD-9-CM: 338.4 Spondylosis of lumbar region without myelopathy or radiculopathy     ICD-10-CM: M47.816 ICD-9-CM: 721.3 Lumbar radiculitis     ICD-10-CM: M54.16 
ICD-9-CM: 724.4 Vitals BP Pulse Temp Resp Height(growth percentile) Weight(growth percentile) 140/86 (BP 1 Location: Right arm, BP Patient Position: Sitting) 76 97 °F (36.1 °C) 16 5' 10\" (1.778 m) 189 lb 9.6 oz (86 kg) SpO2 BMI Smoking Status 97% 27.2 kg/m2 Former Smoker BMI and BSA Data Body Mass Index Body Surface Area  
 27.2 kg/m 2 2.06 m 2 Preferred Pharmacy Pharmacy Name Phone [address], [address] [place] [address] [phone] Your Updated Medication List  
  
   
This list is accurate as of 9/17/18  9:08 AM.  Always use your most recent med list.  
  
  
  
  
 atorvastatin 10 mg tablet Commonly known as:  LIPITOR  
take 1 tablet by mouth once daily * BD REGULAR BEVEL NEEDLES 18 gauge x 1 1/2\" Ndle Generic drug:  Needle (Disp) 18 G  
USE TO DRAW UP TESTOSTERONE AND THEN CHANGE NEEDLE TO GIVE TESTOSTERONE * BD REGULAR BEVEL NEEDLES 18 gauge x 1 1/2\" Ndle Generic drug:  Needle (Disp) 18 G  
USE TO DRAW UP TESTOSTERONE AND THEN CHANGE NEEDLE TO GIVE TESTOSTERONE  
  
 CIALIS 5 mg tablet Generic drug:  tadalafil  
take 1 tablet by mouth once daily DEPO-TESTOSTERONE 100 mg/mL injection Generic drug:  testosterone cypionate  
inject 100 milligrams intramuscularly every 10 days  
  
 diazePAM 10 mg tablet Commonly known as:  VALIUM Take 1 Tab by mouth every eight (8) hours as needed for Anxiety. Max Daily Amount: 30 mg. FISH OIL 1,000 mg Cap Generic drug:  omega-3 fatty acids-vitamin e Take 1 Cap by mouth.  
  
 * HYDROcodone-acetaminophen  mg tablet Commonly known as:  Barnet Cuff Take 0.5-1 Tabs by mouth every four (4) hours as needed for Pain (chronic) for up to 30 days. Indications: Pain * HYDROcodone-acetaminophen  mg tablet Commonly known as:  Jacquelinerogers Mathis Take half to 1 tablet up to 5 times daily as needed for chronic, severe pain. Indications: Pain  
  
 * HYDROcodone-acetaminophen  mg tablet Commonly known as:  Jacqueline Del Take half to 1 tablet up to 4-5 times daily as needed, no more than #140 per month for chronic, severe pain. Indications: Pain Start taking on:  9/24/2018  
  
 * HYDROcodone-acetaminophen  mg tablet Commonly known as:  Jacqueline Del Take half to 1 tablet up to 4-5 times daily as needed for chronic, severe pain. Indications: Pain Start taking on:  10/23/2018  
  
 * HYDROcodone-acetaminophen  mg tablet Commonly known as:  Jacqueline Del Take half to 1 tablet up to 4 times daily as needed for chronic, severe pain. Indications: Pain Start taking on:  11/22/2018  
  
 losartan-hydroCHLOROthiazide 100-25 mg per tablet Commonly known as:  HYZAAR  
take 1 tablet by mouth once daily MULTI FOR HIM PO Take  by mouth.  
  
 naloxone 4 mg/actuation nasal spray Commonly known as:  NARCAN  
4 mg by Nasal route once as needed (opioid overdose) for up to 1 dose. May substitute 2 mg syringe if insurance requires  
  
 prednisoLONE acetate 1 % ophthalmic suspension Commonly known as:  PRED FORTE Administer 1 Drop to right eye four (4) times daily. Syringe (Disposable) 3 mL Syrg Pt to use with Testosterone injections. TENS Units Estephanie Graciela Use as directed  
  
 vardenafil 20 mg tablet Commonly known as:  LEVITRA Take 20 mg by mouth as needed. * Notice: This list has 7 medication(s) that are the same as other medications prescribed for you. Read the directions carefully, and ask your doctor or other care provider to review them with you. Prescriptions Printed Refills HYDROcodone-acetaminophen (NORCO)  mg tablet 0 Starting on: 9/24/2018 Sig: Take half to 1 tablet up to 4-5 times daily as needed, no more than #140 per month for chronic, severe pain. Indications: Pain Class: Print HYDROcodone-acetaminophen (NORCO)  mg tablet 0 Starting on: 10/23/2018 Sig: Take half to 1 tablet up to 4-5 times daily as needed for chronic, severe pain. Indications: Pain Class: Print HYDROcodone-acetaminophen (NORCO)  mg tablet 0 Starting on: 11/22/2018 Sig: Take half to 1 tablet up to 4 times daily as needed for chronic, severe pain. Indications: Pain Class: Print We Performed the Following AMB POC DRUG SCREEN () [ [facility]] DRUG SCREEN [[id number] Custom] Follow-up Instructions Return in about 3 months (around 12/17/2018). Patient Instructions 1. Modify current plan with no evidence of addiction or diversion. Stable on current medication without adverse events. 2. Refill and adjust hydrocodone 10/325 mg up to 5 times daily as needed no more than #140 per month ×1 month, then adjust down to #130 per month ×1 month, then adjust down to 4 times daily as needed until next visit 3. Please discuss further sleep treatment with your PCP as soon as possible. Please remember to avoid benzodiazepines 4. Naloxone 4 mg nasal spray for opioid induced respiratory depression emergency only. 5. Discussed risks of addiction, dependency, and opioid induced hyperalgesia. Please remember to call at least 5 business days prior to your medication refill. Return to clinic in 3 months. Please call and cancel your appointment and pain management agreement if you do decide to transfer your care. Introducing \[facility]\"" & HEALTH SERVICES! Dear Abraham: Thank you for requesting a [employer] account. Our records indicate that you already have an active [employer] account. You can access your account anytime at [email or web address] Did you know that you can access your hospital and ER discharge instructions at any time in [employer]? You can also review all of your test results from your hospital stay or ER visit. Additional Information If you have questions, please visit the Frequently Asked Questions section of the [employer] website at [email or web address] Remember, [employer] is NOT to be used for urgent needs. For medical emergencies, dial 911. Now available from your iPhone and Android! Please provide this summary of care documentation to your next provider. Your primary care clinician is listed as Pamela Diaz. If you have any questions after today's visit, please call [phone].

## 2018-09-17 NOTE — PATIENT INSTRUCTIONS
1. Modify current plan with no evidence of addiction or diversion. Stable on current medication without adverse events. 2. Refill and adjust hydrocodone 10/325 mg up to 5 times daily as needed no more than #140 per month ×1 month, then adjust down to #130 per month ×1 month, then adjust down to 4 times daily as needed until next visit  3. Please discuss further sleep treatment with your PCP as soon as possible. Please remember to avoid benzodiazepines  4. Naloxone 4 mg nasal spray for opioid induced respiratory depression emergency only. 5. Discussed risks of addiction, dependency, and opioid induced hyperalgesia. Please remember to call at least 5 business days prior to your medication refill. Return to clinic in 3 months. Please call and cancel your appointment and pain management agreement if you do decide to transfer your care.

## 2018-09-17 NOTE — PROGRESS NOTES
Nursing Notes    Patient presents to the office today in follow-up. Patient rates his pain at 4/10 on the numerical pain scale. Reviewed medications with counts as follows:    Rx Date filled Qty Dispensed Pill Count Last Dose Short   Norco 10 mg 08/25/18 150 38 This am no       Last opioid agreement 01/19/18  Last urine drug screen 06/11/18    Comments: Patient is here today for a follow up appt today he states he has a pain level today is a 4  PHQ 9 was done patient denies any depression     POC UDS was performed in office today per verbal order per [facility]    Any new labs or imaging since last appointment? NO    Have you been to an emergency room (ER) or urgent care clinic since your last visit? NO            Have you been hospitalized since your last visit? NO     If yes, where, when, and reason for visit? Have you seen or consulted any other health care providers outside of the [address] [place] [address]  since your last visit? YES   Dentist   If yes, where, when, and reason for visit? Mr. Arely Johnson has a reminder for a \"due or due soon\" health maintenance. I have asked that he contact his primary care provider for follow-up on this health maintenance.

## 2018-09-18 RX ORDER — DIAZEPAM 10 MG/1
10 TABLET ORAL
Qty: 30 TAB | Refills: 1 | OUTPATIENT
Start: 2018-09-18 | End: 2018-12-10

## 2018-09-18 NOTE — TELEPHONE ENCOUNTER
From: Abraham Power  To: Amanda Velasquez MD  Sent: 9/17/2018 5:49 PM EDT  Subject: Medication Renewal Request    Original authorizing provider: MD Abraham Black would like a refill of the following medications:  diazePAM (VALIUM) 10 mg tablet Amanda Velasquez MD]    Preferred pharmacy: [address] [place] [address] Ave:

## 2018-10-03 ENCOUNTER — OFFICE VISIT (OUTPATIENT)
Dept: INTERNAL MEDICINE CLINIC | Age: 63
End: 2018-10-03

## 2018-10-03 VITALS
TEMPERATURE: 98.8 F | BODY MASS INDEX: 28.06 KG/M2 | WEIGHT: 196 LBS | SYSTOLIC BLOOD PRESSURE: 114 MMHG | HEIGHT: 70 IN | DIASTOLIC BLOOD PRESSURE: 80 MMHG | RESPIRATION RATE: 14 BRPM | HEART RATE: 88 BPM | OXYGEN SATURATION: 97 %

## 2018-10-03 DIAGNOSIS — M47.816 SPONDYLOSIS OF LUMBAR REGION WITHOUT MYELOPATHY OR RADICULOPATHY: ICD-10-CM

## 2018-10-03 DIAGNOSIS — M54.16 LUMBAR RADICULITIS: ICD-10-CM

## 2018-10-03 DIAGNOSIS — G89.4 CHRONIC PAIN SYNDROME: Primary | ICD-10-CM

## 2018-10-03 DIAGNOSIS — Z23 ENCOUNTER FOR IMMUNIZATION: ICD-10-CM

## 2018-10-03 NOTE — MR AVS SNAPSHOT
[address] [facility] 
 
 
 [address], [address] [place] [address] [facility] 
[phone] Patient: Deneen Thomas MRN:  [id number] Visit Information Date & Time Provider Department Dept. Phone Encounter #  
 10/3/2018  9:00 AM Renetta Graham MD [phone] [id number] Your Appointments 12/10/2018  8:00 AM  
Office Visit with Renetta Graham MD  
[facility]: ov [address]mos. rd  
 [address], [address] [facility] [address] [place]  
  
   
 [address], [address]  
  
    
 12/11/2018  8:00 AM  
Office Visit with NICOLE Nuñez [address] for Pain Management (GABY SCHEDULING) Appt Note: Return in about 3 months (around 12/17/2018; Return in about 3 months (around 12/17/2018 30 [facility] [address] [phone] [facility] [address] Upcoming Health Maintenance Date Due Shingrix Vaccine Age 50> (1 of 2) 10/3/2004 COLONOSCOPY 11/16/2020 DTaP/Tdap/Td series (2 - Td) 11/14/2026 Allergies as of 10/3/2018  Review Complete On: 10/3/2018 By: Joe Bunch LPN Severity Noted Reaction Type Reactions Lisinopril    Other (comments) ED Current Immunizations  Reviewed on 12/2/2017 Name Date H1N1 FLU VACCINE 11/11/2009 Influenza Vaccine 11/1/2017 Influenza Vaccine (Quad) PF 10/3/2018, 11/14/2016, 11/4/2015 Influenza Vaccine Split 11/21/2012 Influenza Vaccine Whole 10/28/2009 Tdap 11/14/2016 Zoster 7/6/2012  9:12 AM  
  
 Not reviewed this visit You Were Diagnosed With   
  
 Codes Comments Encounter for immunization    -  Primary ICD-10-CM: G57 ICD-9-CM: V03.89 Vitals BP Pulse Temp Resp Height(growth percentile) Weight(growth percentile) 114/80 88 98.8 °F (37.1 °C) (Oral) 14 5' 10\" (1.778 m) 196 lb (88.9 kg) SpO2 BMI Smoking Status 97% 28.12 kg/m2 Former Smoker Vitals History BMI and BSA Data Body Mass Index Body Surface Area  
 28.12 kg/m 2 2.1 m 2 Preferred Pharmacy Pharmacy Name Phone [address], [address] [place] [address] [phone] Your Updated Medication List  
  
   
This list is accurate as of 10/3/18  9:39 AM.  Always use your most recent med list.  
  
  
  
  
 atorvastatin 10 mg tablet Commonly known as:  LIPITOR  
take 1 tablet by mouth once daily * BD REGULAR BEVEL NEEDLES 18 gauge x 1 1/2\" Ndle Generic drug:  Needle (Disp) 18 G  
USE TO DRAW UP TESTOSTERONE AND THEN CHANGE NEEDLE TO GIVE TESTOSTERONE * BD REGULAR BEVEL NEEDLES 18 gauge x 1 1/2\" Ndle Generic drug:  Needle (Disp) 18 G  
USE TO DRAW UP TESTOSTERONE AND THEN CHANGE NEEDLE TO GIVE TESTOSTERONE  
  
 CIALIS 5 mg tablet Generic drug:  tadalafil  
take 1 tablet by mouth once daily DEPO-TESTOSTERONE 100 mg/mL injection Generic drug:  testosterone cypionate  
inject 100 milligrams intramuscularly every 10 days  
  
 diazePAM 10 mg tablet Commonly known as:  VALIUM Take 1 Tab by mouth every eight (8) hours as needed for Anxiety. Max Daily Amount: 30 mg. FISH OIL 1,000 mg Cap Generic drug:  omega-3 fatty acids-vitamin e Take 1 Cap by mouth.  
  
 * HYDROcodone-acetaminophen  mg tablet Commonly known as:  Ky Brule Take 0.5-1 Tabs by mouth every four (4) hours as needed for Pain (chronic) for up to 30 days. Indications: Pain  
  
 * HYDROcodone-acetaminophen  mg tablet Commonly known as:  Ky Brule Take half to 1 tablet up to 5 times daily as needed for chronic, severe pain. Indications: Pain  
  
 * HYDROcodone-acetaminophen  mg tablet Commonly known as:  Ky Brule  
 Take half to 1 tablet up to 4-5 times daily as needed, no more than #140 per month for chronic, severe pain. Indications: Pain  
  
 losartan-hydroCHLOROthiazide 100-25 mg per tablet Commonly known as:  HYZAAR  
take 1 tablet by mouth once daily MULTI FOR HIM PO Take  by mouth.  
  
 naloxone 4 mg/actuation nasal spray Commonly known as:  NARCAN  
4 mg by Nasal route once as needed (opioid overdose) for up to 1 dose. May substitute 2 mg syringe if insurance requires  
  
 prednisoLONE acetate 1 % ophthalmic suspension Commonly known as:  PRED FORTE Administer 1 Drop to right eye four (4) times daily. Syringe (Disposable) 3 mL Syrg Pt to use with Testosterone injections. TENS Units Donold Lowing Use as directed  
  
 vardenafil 20 mg tablet Commonly known as:  LEVITRA Take 20 mg by mouth as needed. * Notice: This list has 5 medication(s) that are the same as other medications prescribed for you. Read the directions carefully, and ask your doctor or other care provider to review them with you. We Performed the Following INFLUENZA VIRUS VAC QUAD,SPLIT,PRESV FREE SYRINGE IM [id number] CPT(R)] Patient Instructions Vaccine Information Statement Influenza (Flu) Vaccine (Inactivated or Recombinant): What you need to know Many Vaccine Information Statements are available in [place]oc[place] and other languages. See www.immunize.org/vis Hojas de Información Sobre Vacunas están disponibles en Español y en muchos otros idiomas. Visite www.immunize.org/vis 1. Why get vaccinated? Influenza (flu) is a contagious disease that spreads around the [place] every year, usually between October and May. Flu is caused by influenza viruses, and is spread mainly by coughing, sneezing, and close contact. Anyone can get flu. Flu strikes suddenly and can last several days. Symptoms vary by age, but can include: 
 fever/chills  sore throat  muscle aches  fatigue  cough  headache  runny or stuffy nose Flu can also lead to pneumonia and blood infections, and cause diarrhea and seizures in children. If you have a medical condition, such as heart or lung disease, flu can make it worse. Flu is more dangerous for some people. Infants and young children, people 72years of age and older, pregnant women, and people with certain health conditions or a weakened immune system are at greatest risk. Each year thousands of people in the [facility] die from flu, and many more are hospitalized. Flu vaccine can: 
 keep you from getting flu, 
 make flu less severe if you do get it, and 
 keep you from spreading flu to your family and other people. 2. Inactivated and recombinant flu vaccines A dose of flu vaccine is recommended every flu season. Children 6 months through 6years of age may need two doses during the same flu season. Everyone else needs only one dose each flu season. Some inactivated flu vaccines contain a very small amount of a mercury-based preservative called thimerosal. Studies have not shown thimerosal in vaccines to be harmful, but flu vaccines that do not contain thimerosal are available. There is no live flu virus in flu shots. They cannot cause the flu. There are many flu viruses, and they are always changing. Each year a new flu vaccine is made to protect against three or four viruses that are likely to cause disease in the upcoming flu season. But even when the vaccine doesnt exactly match these viruses, it may still provide some protection Flu vaccine cannot prevent: 
 flu that is caused by a virus not covered by the vaccine, or 
 illnesses that look like flu but are not. It takes about 2 weeks for protection to develop after vaccination, and protection lasts through the flu season. 3. Some people should not get this vaccine Tell the person who is giving you the vaccine:  If you have any severe, life-threatening allergies. If you ever had a life-threatening allergic reaction after a dose of flu vaccine, or have a severe allergy to any part of this vaccine, you may be advised not to get vaccinated. Most, but not all, types of flu vaccine contain a small amount of egg protein.  If you ever had Guillain-Barré Syndrome (also called GBS). Some people with a history of GBS should not get this vaccine. This should be discussed with your doctor.  If you are not feeling well. It is usually okay to get flu vaccine when you have a mild illness, but you might be asked to come back when you feel better. 4. Risks of a vaccine reaction With any medicine, including vaccines, there is a chance of reactions. These are usually mild and go away on their own, but serious reactions are also possible. Most people who get a flu shot do not have any problems with it. Minor problems following a flu shot include:  
 soreness, redness, or swelling where the shot was given  hoarseness  sore, red or itchy eyes  cough  fever  aches  headache  itching  fatigue If these problems occur, they usually begin soon after the shot and last 1 or 2 days. More serious problems following a flu shot can include the following:  There may be a small increased risk of Guillain-Barré Syndrome (GBS) after inactivated flu vaccine. This risk has been estimated at 1 or 2 additional cases per million people vaccinated. This is much lower than the risk of severe complications from flu, which can be prevented by flu vaccine.  Young children who get the flu shot along with pneumococcal vaccine (PCV13) and/or DTaP vaccine at the same time might be slightly more likely to have a seizure caused by fever. Ask your doctor for more information. Tell your doctor if a child who is getting flu vaccine has ever had a seizure. Problems that could happen after any injected vaccine:  People sometimes faint after a medical procedure, including vaccination. Sitting or lying down for about 15 minutes can help prevent fainting, and injuries caused by a fall. Tell your doctor if you feel dizzy, or have vision changes or ringing in the ears.  Some people get severe pain in the shoulder and have difficulty moving the arm where a shot was given. This happens very rarely.  Any medication can cause a severe allergic reaction. Such reactions from a vaccine are very rare, estimated at about 1 in a million doses, and would happen within a few minutes to a few hours after the vaccination. As with any medicine, there is a very remote chance of a vaccine causing a serious injury or death. The safety of vaccines is always being monitored. For more information, visit: www.cdc.gov/vaccinesafety/ 
 
 
6. The National Vaccine Injury Compensation Program 
 
The [facility] Vaccine Injury Compensation Program (VICP) is a federal program that was created to compensate people who may have been injured by certain vaccines. Persons who believe they may have been injured by a vaccine can learn about the program and about filing a claim by calling 8-395.857.8716 or visiting the [employer] website at www.[facility].gov/vaccinecompensation. There is a time limit to file a claim for compensation. 7. How can I learn more?  Ask your healthcare provider. He or she can give you the vaccine package insert or suggest other sources of information.  Call your local or state health department.  Contact the Centers for Disease Control and Prevention (CDC): 
- Call 6-357.140.5034 (1-209-HZQ-INFO) or 
- Visit CDCs website at www.cdc.gov/flu Vaccine Information Statement Inactivated Influenza Vaccine 8/7/2015 
42 [facility] [id number]-[id number] [facility] and Inside Secure Centers for Disease Control and Prevention Office Use Only [facility] & HEALTH SERVICES! Dear Abraham: Thank you for requesting a [employer] account. Our records indicate that you already have an active [employer] account. You can access your account anytime at [email or web address]. Did you know that you can access your hospital and ER discharge instructions at any time in [employer]? You can also review all of your test results from your hospital stay or ER visit. Additional Information If you have questions, please visit the Frequently Asked Questions section of the [employer] website at [email or web address]. Remember, [employer] is NOT to be used for urgent needs. For medical emergencies, dial 911. Now available from your iPhone and Android! Please provide this summary of care documentation to your next provider. Your primary care clinician is listed as Gabrielle Amaya. If you have any questions after today's visit, please call [phone].

## 2018-10-03 NOTE — PROGRESS NOTES
58 y.o. WHITE OR  male who presents for evaluation. He is just here to talk about the pain mgmt situation. He is not really wanting to come off the narcotics which is the new approach at [facility]. We talked about referring him to Dr Vianca Bolton office or even [facility] and he is open to that. He's using about 4 tabs on average a day now and certain days are good, other days not enough to hold off his pain. He's never had any discrepancies noted and has realistic outlook with his situation Past Medical History:  
Diagnosis Date  Chronic back pain Dr. Haritha Guido  Dyslipidemia  Erectile dysfunction  Fatty liver US 2002, serologies negative; fib-4 1.06 from 3/14  
 FHx: colon cancer  H/O bone scan 3/14  
 negative outside of [facility]  
 HTN (hypertension)  Hypogonadism male   
 negative pituitary MRI 2013 [facility] Internal hemorrhoids 11/15 Dr Coral Sultana  Lower urinary tract symptoms (LUTS) 5/16  
 mild  Lumbago MRI 1/14 and CT 3/14 w lumbar spinal stenosis severe  Lumbar post-laminectomy syndrome  Lumbar spinal stenosis  Prediabetes 2 hr gtt 124 from 10/11  Rosacea  S/P lumbar spinal fusion  Spasm of muscle  Status post lumbar laminectomy  Thoracic or lumbosacral neuritis or radiculitis, unspecified Current Outpatient Prescriptions Medication Sig  
 diazePAM (VALIUM) 10 mg tablet Take 1 Tab by mouth every eight (8) hours as needed for Anxiety. Max Daily Amount: 30 mg.  
 HYDROcodone-acetaminophen (NORCO)  mg tablet Take half to 1 tablet up to 4-5 times daily as needed, no more than #140 per month for chronic, severe pain. Indications: Pain  BD REGULAR BEVEL NEEDLES 18 gauge x 1 1/2\" ndle USE TO DRAW UP TESTOSTERONE AND THEN CHANGE NEEDLE TO GIVE TESTOSTERONE  Syringe, Disposable, 3 mL syrg Pt to use with Testosterone injections.  DEPO-TESTOSTERONE 100 mg/mL injection inject 100 milligrams intramuscularly every 10 days  atorvastatin (LIPITOR) 10 mg tablet take 1 tablet by mouth once daily  vardenafil (LEVITRA) 20 mg tablet Take 20 mg by mouth as needed.  losartan-hydroCHLOROthiazide (HYZAAR) 100-25 mg per tablet take 1 tablet by mouth once daily  BD REGULAR BEVEL NEEDLES 18 gauge x 1 1/2\" ndle USE TO DRAW UP TESTOSTERONE AND THEN CHANGE NEEDLE TO GIVE TESTOSTERONE  TENS Units georgie Use as directed  omega-3 fatty acids-vitamin e (FISH OIL) 1,000 mg Cap Take 1 Cap by mouth.  MULTIVITS/IRON FUM/FA/D3/LYCOP (MULTI FOR HIM PO) Take  by mouth.  HYDROcodone-acetaminophen (NORCO)  mg tablet Take half to 1 tablet up to 5 times daily as needed for chronic, severe pain. Indications: Pain  prednisoLONE acetate (PRED FORTE) 1 % ophthalmic suspension Administer 1 Drop to right eye four (4) times daily.  naloxone 4 mg/actuation spry 4 mg by Nasal route once as needed (opioid overdose) for up to 1 dose. May substitute 2 mg syringe if insurance requires  HYDROcodone-acetaminophen (NORCO)  mg tablet Take 0.5-1 Tabs by mouth every four (4) hours as needed for Pain (chronic) for up to 30 days. Indications: Pain  CIALIS 5 mg tablet take 1 tablet by mouth once daily No current facility-administered medications for this visit. Allergies Allergen Reactions  Lisinopril Other (comments) ED Visit Vitals  /80  Pulse 88  Temp 98.8 °F (37.1 °C) (Oral)  Resp 14  
 Ht 5' 10\" (1.778 m)  Wt 196 lb (88.9 kg)  SpO2 97%  BMI 28.12 kg/m2  
no exam was done Assessment and plan: 1. Chronic pain. Will refer to Dr Al Banuelos group Above conditions discussed at length and patient vocalized understanding. All questions answered to patient satisfaction

## 2018-10-03 NOTE — PATIENT INSTRUCTIONS
Vaccine Information Statement Influenza (Flu) Vaccine (Inactivated or Recombinant): What you need to know Many Vaccine Information Statements are available in Romanian and other languages. See www.immunize.org/vis Hojas de Información Sobre Vacunas están disponibles en Español y en muchos otros idiomas. Visite www.immunize.org/vis 1. Why get vaccinated? Influenza (flu) is a contagious disease that spreads around the [place] every year, usually between October and May. Flu is caused by influenza viruses, and is spread mainly by coughing, sneezing, and close contact. Anyone can get flu. Flu strikes suddenly and can last several days. Symptoms vary by age, but can include: 
 fever/chills  sore throat  muscle aches  fatigue  cough  headache  runny or stuffy nose Flu can also lead to pneumonia and blood infections, and cause diarrhea and seizures in children. If you have a medical condition, such as heart or lung disease, flu can make it worse. Flu is more dangerous for some people. Infants and young children, people 72years of age and older, pregnant women, and people with certain health conditions or a weakened immune system are at greatest risk. Each year thousands of people in the [facility] die from flu, and many more are hospitalized. Flu vaccine can: 
 keep you from getting flu, 
 make flu less severe if you do get it, and 
 keep you from spreading flu to your family and other people. 2. Inactivated and recombinant flu vaccines A dose of flu vaccine is recommended every flu season. Children 6 months through 6years of age may need two doses during the same flu season. Everyone else needs only one dose each flu season.   
 
 
Some inactivated flu vaccines contain a very small amount of a mercury-based preservative called thimerosal. Studies have not shown thimerosal in vaccines to be harmful, but flu vaccines that do not contain thimerosal are available. There is no live flu virus in flu shots. They cannot cause the flu. There are many flu viruses, and they are always changing. Each year a new flu vaccine is made to protect against three or four viruses that are likely to cause disease in the upcoming flu season. But even when the vaccine doesnt exactly match these viruses, it may still provide some protection Flu vaccine cannot prevent: 
 flu that is caused by a virus not covered by the vaccine, or 
 illnesses that look like flu but are not. It takes about 2 weeks for protection to develop after vaccination, and protection lasts through the flu season. 3. Some people should not get this vaccine Tell the person who is giving you the vaccine:  If you have any severe, life-threatening allergies. If you ever had a life-threatening allergic reaction after a dose of flu vaccine, or have a severe allergy to any part of this vaccine, you may be advised not to get vaccinated. Most, but not all, types of flu vaccine contain a small amount of egg protein.  If you ever had Guillain-Barré Syndrome (also called GBS). Some people with a history of GBS should not get this vaccine. This should be discussed with your doctor.  If you are not feeling well. It is usually okay to get flu vaccine when you have a mild illness, but you might be asked to come back when you feel better. 4. Risks of a vaccine reaction With any medicine, including vaccines, there is a chance of reactions. These are usually mild and go away on their own, but serious reactions are also possible. Most people who get a flu shot do not have any problems with it. Minor problems following a flu shot include:  
 soreness, redness, or swelling where the shot was given  hoarseness  sore, red or itchy eyes  cough  fever  aches  headache  itching  fatigue If these problems occur, they usually begin soon after the shot and last 1 or 2 days. More serious problems following a flu shot can include the following:  There may be a small increased risk of Guillain-Barré Syndrome (GBS) after inactivated flu vaccine. This risk has been estimated at 1 or 2 additional cases per million people vaccinated. This is much lower than the risk of severe complications from flu, which can be prevented by flu vaccine.  Young children who get the flu shot along with pneumococcal vaccine (PCV13) and/or DTaP vaccine at the same time might be slightly more likely to have a seizure caused by fever. Ask your doctor for more information. Tell your doctor if a child who is getting flu vaccine has ever had a seizure. Problems that could happen after any injected vaccine:  People sometimes faint after a medical procedure, including vaccination. Sitting or lying down for about 15 minutes can help prevent fainting, and injuries caused by a fall. Tell your doctor if you feel dizzy, or have vision changes or ringing in the ears.  Some people get severe pain in the shoulder and have difficulty moving the arm where a shot was given. This happens very rarely.  Any medication can cause a severe allergic reaction. Such reactions from a vaccine are very rare, estimated at about 1 in a million doses, and would happen within a few minutes to a few hours after the vaccination. As with any medicine, there is a very remote chance of a vaccine causing a serious injury or death. The safety of vaccines is always being monitored. For more information, visit: www.cdc.gov/vaccinesafety/ 
 
5. What if there is a serious reaction? What should I look for?  Look for anything that concerns you, such as signs of a severe allergic reaction, very high fever, or unusual behavior.  
 
Signs of a severe allergic reaction can include hives, swelling of the face and throat, difficulty breathing, a fast heartbeat, dizziness, and weakness  usually within a few minutes to a few hours after the vaccination. What should I do?  If you think it is a severe allergic reaction or other emergency that cant wait, call 9-1-1 and get the person to the nearest hospital. Otherwise, call your doctor.  Reactions should be reported to the Vaccine Adverse Event Reporting System (VAERS). Your doctor should file this report, or you can do it yourself through  the VAERS web site at www.vaers. [facility].gov, or by calling 8-934.595.3874. VAERS does not give medical advice. 6. The National Vaccine Injury Compensation Program 
 
The [facility] Vaccine Injury Compensation Program (VICP) is a federal program that was created to compensate people who may have been injured by certain vaccines. Persons who believe they may have been injured by a vaccine can learn about the program and about filing a claim by calling 6-198.195.9619 or visiting the [facility] [employer] website at www.[facility].gov/vaccinecompensation. There is a time limit to file a claim for compensation. 7. How can I learn more?  Ask your healthcare provider. He or she can give you the vaccine package insert or suggest other sources of information.  Call your local or state health department.  Contact the Centers for Disease Control and Prevention (CDC): 
- Call 8-106.139.1802 (1-800-CDC-INFO) or 
- Visit CDCs website at www.cdc.gov/flu Vaccine Information Statement Inactivated Influenza Vaccine 8/7/2015 
42 SUDEEP Ellison 314QY-27 Department of [facility] and [employer] Centers for Disease Control and Prevention Office Use Only

## 2018-10-03 NOTE — PROGRESS NOTES
1. Have you been to the ER, urgent care clinic or hospitalized since your last visit? NO.  
 
2. Have you seen or consulted any other health care providers outside of the [address] [place] [address] since your last visit (Include any pap smears or colon screening)? NO Do you have an Advanced Directive? YES Chief Complaint Patient presents with  Medication Evaluation Warden Koyanagi is a 58 y.o. male who presents for routine immunizations. Per verbal order from [address]. Influenza given in left deltoid. He denies any symptoms , reactions or allergies that would exclude them from being immunized today. Risks and adverse reactions were discussed and the VIS was given to them. All questions were addressed. He was observed for 15 min post injection. There were no reactions observed.  
 
Jose Singletary LPN

## 2018-10-17 ENCOUNTER — TELEPHONE (OUTPATIENT)
Dept: PAIN MANAGEMENT | Age: 63
End: 2018-10-17

## 2018-10-17 NOTE — TELEPHONE ENCOUNTER
Pt called in for a prescription refill on 10/16/18. Please give pt a call at [phone] when the prescription is ready for .       Medication:Norco  Relief:95%  Daily Task:Yes  Side Effects:No

## 2018-10-17 NOTE — TELEPHONE ENCOUNTER
[facility] has called requesting a refill of their controlled medication, norco, for the management of his chronic back pain. Last office visit date: 09/17/18, last opioid agreement was 12/19/17, last UDS was   09/17/18    Date last  was pulled and reviewed : 10/17/18, last fill date for norco was 09/24/18. Was the patient compliant when the above report was pulled? Yes. Pt also on valium from an outside office    Analgesia: pt reports a 95% pain relief with his current opioid medication regimen     Aberrancies: none noted     ADL's: pt reports that he is able to perform his normal daily duties because he is taking his medication. Adverse Reaction: none reported by the pt at this time. Provider's last note and plan of care reviewed? Yes, pre-printed prescription  Request forwarded to provider for review.

## 2018-10-17 NOTE — TELEPHONE ENCOUNTER
Please remind patient that it is contraindicated to have concurrent benzodiazepines and opioid treatment. Please remind him to avoid use of benzodiazepines. Please remind him that there is no driving and no alcohol use while on opioids. Reviewed. OK to distribute prescription.

## 2018-10-17 NOTE — TELEPHONE ENCOUNTER
Pt was contacted about his prescription refill request. He was identified using 2 pt identifiers. The pt was advised of all of the things that the provider had mentioned in his reply. The pt verbalized understanding and has no questions. He states that he very rarely takes the valium and only uses this for a muscle relaxer. He was made aware that his prescription is done and ready for . No questions from the pt at this time.

## 2018-10-21 DIAGNOSIS — I10 ESSENTIAL HYPERTENSION: ICD-10-CM

## 2018-10-22 RX ORDER — LOSARTAN POTASSIUM AND HYDROCHLOROTHIAZIDE 25; 100 MG/1; MG/1
TABLET ORAL
Qty: 90 TAB | Refills: 3 | Status: SHIPPED | OUTPATIENT
Start: 2018-10-22 | End: 2019-10-10 | Stop reason: SDUPTHER

## 2018-11-05 DIAGNOSIS — E29.1 HYPOGONADISM IN MALE: ICD-10-CM

## 2018-11-05 RX ORDER — TESTOSTERONE CYPIONATE 100 MG/ML
INJECTION, SOLUTION INTRAMUSCULAR
Qty: 10 ML | Refills: 5 | Status: SHIPPED | OUTPATIENT
Start: 2018-11-05 | End: 2019-05-10 | Stop reason: SDUPTHER

## 2018-11-06 ENCOUNTER — TELEPHONE (OUTPATIENT)
Dept: INTERNAL MEDICINE CLINIC | Age: 63
End: 2018-11-06

## 2018-12-04 ENCOUNTER — HOSPITAL ENCOUNTER (OUTPATIENT)
Dept: LAB | Age: 63
Discharge: HOME | End: 2018-12-04
Payer: COMMERCIAL

## 2018-12-04 DIAGNOSIS — R73.01 IFG (IMPAIRED FASTING GLUCOSE): ICD-10-CM

## 2018-12-04 DIAGNOSIS — E29.1 HYPOGONADISM MALE: ICD-10-CM

## 2018-12-04 DIAGNOSIS — E78.5 DYSLIPIDEMIA: ICD-10-CM

## 2018-12-04 LAB
ALBUMIN SERPL-MCNC: 4 G/DL (ref 3.4–5)
ALBUMIN/GLOB SERPL: 1.3 {RATIO} (ref 0.8–1.7)
ALP SERPL-CCNC: 69 U/L (ref 45–117)
ALT SERPL-CCNC: 55 U/L (ref 16–61)
ANION GAP SERPL CALC-SCNC: 9 MMOL/L (ref 3–18)
AST SERPL-CCNC: 31 U/L (ref 15–37)
BILIRUB SERPL-MCNC: 1.1 MG/DL (ref 0.2–1)
BUN SERPL-MCNC: 17 MG/DL (ref 7–18)
BUN/CREAT SERPL: 17 (ref 12–20)
CALCIUM SERPL-MCNC: 9.2 MG/DL (ref 8.5–10.1)
CHLORIDE SERPL-SCNC: 104 MMOL/L (ref 100–108)
CO2 SERPL-SCNC: 30 MMOL/L (ref 21–32)
CREAT SERPL-MCNC: 1.01 MG/DL (ref 0.6–1.3)
GLOBULIN SER CALC-MCNC: 3.1 G/DL (ref 2–4)
GLUCOSE SERPL-MCNC: 108 MG/DL (ref 74–99)
HBA1C MFR BLD: 5.5 % (ref 4.2–5.6)
POTASSIUM SERPL-SCNC: 4.2 MMOL/L (ref 3.5–5.5)
PROT SERPL-MCNC: 7.1 G/DL (ref 6.4–8.2)
SODIUM SERPL-SCNC: 143 MMOL/L (ref 136–145)

## 2018-12-04 PROCEDURE — 36415 COLL VENOUS BLD VENIPUNCTURE: CPT

## 2018-12-04 PROCEDURE — 83036 HEMOGLOBIN GLYCOSYLATED A1C: CPT

## 2018-12-04 PROCEDURE — 84403 ASSAY OF TOTAL TESTOSTERONE: CPT

## 2018-12-04 PROCEDURE — 80053 COMPREHEN METABOLIC PANEL: CPT

## 2018-12-05 LAB — TESTOST SERPL-MCNC: 466 NG/DL (ref 264–916)

## 2018-12-09 NOTE — PROGRESS NOTES
58 y.o. WHITE OR  male who presents for f/u He continues to see pain mgmt and the meds continue to allow him to function with adls. He's active but no set exercise routine. Denied any cardiovascular complaints. bp has been running high of late although when he's having a good day, running in thew 120s over 70s No GI or  complaints. Continues on testosterone replacement and is happy w the results. No polyuria, polydipsia, nocturia, vision change, not checking sugars at this time. No success with attempts at wt loss Vitals 12/10/2018 10/3/2018 9/17/2018 7/25/2018 Weight 194 lb 196 lb 189 lb 9.6 oz 192 lb  
 
IF 12/18 Past Medical History:  
Diagnosis Date  Chronic back pain Dr. Mely Vega  Dyslipidemia  Erectile dysfunction  Fatty liver US 2002, serologies negative; fib-4 1.06 from 3/14  
 FHx: colon cancer  H/O bone scan 3/14  
 negative outside of [facility]  
 HTN (hypertension)  Hypogonadism male   
 negative pituitary MRI 2013 [facility] Internal hemorrhoids 11/15 Dr Juan Adame  Lower urinary tract symptoms (LUTS) 5/16  
 mild  Lumbago MRI 1/14 and CT 3/14 w lumbar spinal stenosis severe  Lumbar post-laminectomy syndrome  Lumbar spinal stenosis  Overweight (BMI 25.0-29. 9) IF 12/18 start weight 194 lbs  Prediabetes 2 hr gtt 124 from 10/11  Rosacea  S/P lumbar spinal fusion  Spasm of muscle  Status post lumbar laminectomy  Thoracic or lumbosacral neuritis or radiculitis, unspecified Past Surgical History:  
Procedure Laterality Date  CHEST SURGERY PROCEDURE UNLISTED    
 resection of a RUL lung lesion for recurring polyps 1980s  HX CATARACT REMOVAL  09/2014 Dr Wong Peng right  HX COLONOSCOPY    
 negative Dr. Octavio Gonsales 2005, 2010; Dr Neva Redd 11/15 hemorrhoids  HX CORNEAL TRANSPLANT Dr Michael Montesinos 10/17, L 2/18 [address] RESECTION  2012 Dr. Jose Su right buttock  HX LUMBAR LAMINECTOMY Social History Socioeconomic History  Marital status:  Spouse name: Not on file  Number of children: 4  
 Years of education: Not on file  Highest education level: Not on file Social Needs  Financial resource strain: Not on file  Food insecurity - worry: Not on file  Food insecurity - inability: Not on file  Transportation needs - medical: Not on file  Transportation needs - non-medical: Not on file Occupational History  Occupation: sup [employer] works Tobacco Use  Smoking status: Former Smoker Types: Cigarettes  Smokeless tobacco: Never Used Substance and Sexual Activity  Alcohol use: Yes Alcohol/week: 0.0 oz  
  Comment: social  
 Drug use: No  
 Sexual activity: Not on file Other Topics Concern  Not on file Social History Narrative  Not on file Allergies Allergen Reactions  Lisinopril Other (comments) ED Current Outpatient Medications Medication Sig  Needle, Disp, 18 G (BD REGULAR BEVEL NEEDLES) 18 gauge x 1 1/2\" ndle USE TO DRAW UP TESTOSTERONE AND THEN CHANGE NEEDLE TO GIVE TESTOSTERONE  testosterone cypionate (DEPO-TESTOSTERONE) 100 mg/mL injection inject 100 milligrams intramuscularly every 10 days  losartan-hydroCHLOROthiazide (HYZAAR) 100-25 mg per tablet take 1 tablet by mouth once daily  Syringe, Disposable, 3 mL syrg Pt to use with Testosterone injections.  HYDROcodone-acetaminophen (NORCO)  mg tablet Take half to 1 tablet up to 5 times daily as needed for chronic, severe pain. Indications: Pain  atorvastatin (LIPITOR) 10 mg tablet take 1 tablet by mouth once daily  vardenafil (LEVITRA) 20 mg tablet Take 20 mg by mouth as needed.  BD REGULAR BEVEL NEEDLES 18 gauge x 1 1/2\" ndle USE TO DRAW UP TESTOSTERONE AND THEN CHANGE NEEDLE TO GIVE TESTOSTERONE  naloxone 4 mg/actuation spry 4 mg by Nasal route once as needed (opioid overdose) for up to 1 dose. May substitute 2 mg syringe if insurance requires  omega-3 fatty acids-vitamin e (FISH OIL) 1,000 mg Cap Take 1 Cap by mouth.  MULTIVITS/IRON FUM/FA/D3/LYCOP (MULTI FOR HIM PO) Take  by mouth.  HYDROcodone-acetaminophen (NORCO)  mg tablet Take half to 1 tablet up to 4-5 times daily as needed, no more than #140 per month for chronic, severe pain. Indications: Pain  
 HYDROcodone-acetaminophen (NORCO)  mg tablet Take 0.5-1 Tabs by mouth every four (4) hours as needed for Pain (chronic) for up to 30 days. Indications: Pain No current facility-administered medications for this visit. REVIEW OF SYSTEMS: colo 11/15 Dr Gustabo Crenshaw, sees Dr Emily Way Ophtho  no vision change or eye pain Oral  no mouth pain, tongue or tooth problems Ears  no hearing loss, ear pain, fullness, no swallowing problems Cardiac  no CP, PND, orthopnea, edema, palpitations or syncope Chest  no breast masses Resp  no wheezing, chronic coughing, dyspnea GI  no heartburn, nausea, vomiting, change in bowel habits, bleeding, hemorrhoids Urinary  no dysuria, hematuria, flank pain, urgency, frequency Endo - no polyuria, polydipsia, nocturia, hot flashes Visit Vitals /62 Pulse 89 Temp 98.6 °F (37 °C) (Oral) Resp 14 Ht 5' 10\" (1.778 m) Wt 194 lb (88 kg) SpO2 98% BMI 27.84 kg/m² Affect is appropriate. Mood stable No apparent distress HEENT --Anicteric sclerae. No thyromegaly, JVD, or bruits. Lungs --Clear to auscultation, normal percussion. Heart --Regular rate and rhythm, no murmurs, rubs, gallops, or clicks. Chest wall --Nontender to palpation. PMI normal. 
Abdomen -- Soft and nontender, no hepatosplenomegaly or masses. Extremities -- Without cyanosis, clubbing, edema. 2+ pulses equally and bilaterally. LABS From 10/11 showed        hba1c 5.8, ldl-p 1008, chol 197, tg 200, hdl 45, ldl-c 112,                   psa 0.70, 2 hr  From 6/12  showed  gluc 104, cr 0.94, gfr 90,  alt 20, hba1c 5.7, ldl-p 2204, chol 197, tg 225, hdl 45, ldl-c 107,  tsh 0.62 From 11/12 showed gluc 100, cr 0.94, gfr 89                                ldl-p 1356                       test 316 From 5/13  showed  gluc 95,   cr 0.86                         hba1c 5.6, ldl-p 1183,  chol 147, tg 171, hdl 53, ldl-c 60,            test 313 From 5/13 showed                                test 196, lh 3.0, psa 0.60 From 11/13 showed        hba1c 5.5,     chol 164, tg 165, hdl 47, ldl-c 84 From 3/14 showed   gluc 91,   cr 0.83, gfr 96,  alt 42,      chol 151, tg 160, hdl 42, ldl-c 77,   wbc 7.7,   hb 15.2, plt 249,          psa 0.60, esr 2, spep neg, cea 0.9, crp 1.30, IL-6 1.2 From 5/14 showed   gluc 156, cr 1.02, gfr>60, alt 61,                 wbc 12.8, hb 14.6, plt 231, b12 722, fol>24(on pred) From 10/14 showed gluc 101, cr 0.90, gfr 93,  alt 45, hba1c 5.7 From 5/15 showed        hba1c 5.7,     chol 155, tg 157, hdl 41, ldl-c 83,   wbc 6.6,   hb 14.6, plt 191, test 103,         psa 0.80 From 11/15 showed        hba1c 5.6,     chol 152, tg 159, hdl 49, ldl-c 81,          test 228 From 5/16 showed           chol 142, tg 118, hdl 38, ldl-c 80,   wbc 6.8,   hb 15.7, plt 203, test 395,         psa 0.9 From 11/16 showed gluc 103, cr 1.14, gfr>60, alt 73,                 test 346 From 5/17 showed           chol 139, tg 182, hdl 45, ldl-c 58,   wbc 7.2,   hb 15.7, plt 217,          psa 0.60, hep c neg From 11/17 showed gluc 92,  cr 1.00l, gfr>60, alt 59, hba1c 5.4,                test 884 From 6/18 showed          chol 153, tg 163, hdl 44, ldl-c 76,   wbc 6.1,   hb 15.3, plt 206, test 382,         psa 0.70 Results for orders placed or performed during the hospital encounter of 12/04/18 METABOLIC PANEL, COMPREHENSIVE Result Value Ref Range Sodium 143 136 - 145 mmol/L Potassium 4.2 3.5 - 5.5 mmol/L  Chloride 104 100 - 108 mmol/L  
 CO2 30 21 - 32 mmol/L Anion gap 9 3.0 - 18 mmol/L Glucose 108 (H) 74 - 99 mg/dL BUN 17 7.0 - 18 MG/DL Creatinine 1.01 0.6 - 1.3 MG/DL  
 BUN/Creatinine ratio 17 12 - 20 GFR est AA >60 >60 ml/min/1.73m2 GFR est non-AA >60 >60 ml/min/1.73m2 Calcium 9.2 8.5 - 10.1 MG/DL Bilirubin, total 1.1 (H) 0.2 - 1.0 MG/DL  
 ALT (SGPT) 55 16 - 61 U/L  
 AST (SGOT) 31 15 - 37 U/L Alk. phosphatase 69 45 - 117 U/L Protein, total 7.1 6.4 - 8.2 g/dL Albumin 4.0 3.4 - 5.0 g/dL Globulin 3.1 2.0 - 4.0 g/dL A-G Ratio 1.3 0.8 - 1.7 TESTOSTERONE, TOTAL, ADULT MALE Result Value Ref Range Testosterone 466 264 - 916 ng/dL HEMOGLOBIN A1C W/O EAG Result Value Ref Range Hemoglobin A1c 5.5 4.2 - 5.6 % Patient Active Problem List  
Diagnosis Code  Dyslipidemia E78.5  S/P lumbar spinal fusion Z98.1  Hypogonadism male E29.1  Erectile dysfunction N52.9  
 IFG (impaired fasting glucose) R73.01  
 Primary hypertension I10  Chronic pain syndrome G89.4  Spondylosis of lumbar region without myelopathy or radiculopathy M47.816  
 Encounter for long-term (current) drug use Z79.899  Lumbar radiculitis M54.16  
 Overweight (BMI 25.0-29. 9) E66.3 ASSESSMENT AND PLAN: 
1. Hypertension. Continue current regimen. He wants to lose weight before making any med changes 2. Fatty liver. Wt loss, exercise, abstinence reiterated 3. Dyslipidemia. Continue current regimen. 4.  FH colon ca. F/U colo 2020 5. Prediabetes. Wt loss as below 6. ED. Continue prn levitra 7. Hypogonadism. Continue current regimen. 8.  Chronic pain. F/U pain clinic 9. Overweight. Intermittent fasting discussed at length. Explained the concepts in detail. Went over possible physiologic changes that could occur and how that would possibly impact his situation in a positive way. Discussed 16:8 program in particular.   We also went over the differences between hunger and nina hypoglycemia. He will do some more research and consider implementing in the near future, standard handout given RTC 6/19 Above conditions discussed at length and patient vocalized understanding. All questions answered to patient satisfaction

## 2018-12-10 ENCOUNTER — OFFICE VISIT (OUTPATIENT)
Dept: INTERNAL MEDICINE CLINIC | Age: 63
End: 2018-12-10

## 2018-12-10 VITALS
SYSTOLIC BLOOD PRESSURE: 152 MMHG | RESPIRATION RATE: 14 BRPM | WEIGHT: 194 LBS | TEMPERATURE: 98.6 F | BODY MASS INDEX: 27.77 KG/M2 | HEIGHT: 70 IN | HEART RATE: 89 BPM | OXYGEN SATURATION: 98 % | DIASTOLIC BLOOD PRESSURE: 62 MMHG

## 2018-12-10 DIAGNOSIS — E66.3 OVERWEIGHT (BMI 25.0-29.9): ICD-10-CM

## 2018-12-10 DIAGNOSIS — R73.01 IFG (IMPAIRED FASTING GLUCOSE): ICD-10-CM

## 2018-12-10 DIAGNOSIS — E78.5 DYSLIPIDEMIA: Primary | ICD-10-CM

## 2018-12-10 DIAGNOSIS — E29.1 HYPOGONADISM MALE: ICD-10-CM

## 2018-12-10 DIAGNOSIS — I10 ESSENTIAL HYPERTENSION: ICD-10-CM

## 2018-12-10 DIAGNOSIS — N52.9 ERECTILE DYSFUNCTION, UNSPECIFIED ERECTILE DYSFUNCTION TYPE: ICD-10-CM

## 2018-12-10 DIAGNOSIS — G89.4 CHRONIC PAIN SYNDROME: ICD-10-CM

## 2018-12-10 NOTE — PROGRESS NOTES
1. Have you been to the ER, urgent care clinic or hospitalized since your last visit? NO.  
 
2. Have you seen or consulted any other health care providers outside of the [address] [place] [address] since your last visit (Include any pap smears or colon screening)? NO Do you have an Advanced Directive? NO Would you like information on Advanced Directives? NO Chief Complaint Patient presents with  Cholesterol Problem 6 month follow up with labs

## 2019-01-14 ENCOUNTER — TELEPHONE (OUTPATIENT)
Dept: INTERNAL MEDICINE CLINIC | Age: 64
End: 2019-01-14

## 2019-01-14 DIAGNOSIS — G47.00 INSOMNIA, UNSPECIFIED TYPE: Primary | ICD-10-CM

## 2019-01-14 RX ORDER — TRAZODONE HYDROCHLORIDE 50 MG/1
50 TABLET ORAL
Qty: 30 TAB | Refills: 5 | Status: SHIPPED | OUTPATIENT
Start: 2019-01-14 | End: 2019-10-21

## 2019-01-14 NOTE — TELEPHONE ENCOUNTER
Regarding: RE:Celso After Visit Follow-Up Message. Contact: [phone]  ----- Message from [address] [place] [address] [address], Generic sent at 1/12/2019  6:46 PM EST -----    Once again Manuel Perales, and just to let you know eating just twice a day is working, as far as my weight, I have lost 8lbs. in 5 weeks, still learning what to eat and what not to eat. We will see in June when I get to see you again, but I will stick with it, working great and so far easy to do.  ----- Message -----  From: Clarisse Osorio MD  Sent: 1/10/2019  5:04 PM EST  To: Abraham Patterson  Subject: RE:Celso After Visit Follow-Up Message. Has trazodone been tried in the past - may actually do well on this    ----- Message -----     From: Abraham Patterson     Sent: 1/4/2019  1:35 PM EST       To: Clarisse Osorio MD  Subject: RE:Celso After Visit Follow-Up Message. Dr Cong Ramirez --    What other sleep aid would you recommend that I can take that will go well with the [address]?    ----- Message -----  From: Clarisse Osorio MD  Sent: 12/14/2018  4:57 PM EST  To: Abraham Patterson  Subject: Lane Duran After Visit Follow-Up Message. Ideally you don't take them together but we understand the reasoning  Pain mgmt doesn't want anything concurrent with the pain meds that can suppress the breathing.      ----- Message -----     From: Abraham Patterson     Sent: 12/13/2018  6:32 PM EST       To: Clarisse Osorio MD  Subject: RE:Celso After Visit Follow-Up Message. I  forgot to ask Dr. Cong Ramirez about taking a 10 mg valium at times, only at night when I have a problem sleeping. It is my back that causes this issue, and makes the next day not so good. Pain Management does not like taking valium & Norco, I do understand. I am asking is there anything out there that would maybe work the same way and be alright? I've tried sleeping med. and did not work very good for me, can't remember why.  The valium does work, after taking one, within a few minutes I fall to sleep for the rest of the night, works for me but not the system :-) Hope this is easy I don't mean to be a pain. . I do feel the new way to eat will do wonders for some folks. I have lost 4 lbs in 3 days, easy so far. THANKS for ALL you DO!  ----- Message -----  From: Paola Conroy MD  Sent: 12/13/2018 12:00 AM EST  To: Abraham Haddad  Subject: [facility] After Visit Follow-Up Message. Barbara Mr. Skinner Citizen,    Thank you for your recent visit to [address] [address] [address] Your health is important to us and we are privileged to be your healthcare provider and partner. If you have follow-up questions regarding your treatment plan from todays visit, please contact us through the [employer] Patient Portal by replying to this message. In the near future, you may receive a survey about your experience with us. We hope you will take the time to let us know how we did so we can continue to improve the care you receive.       Thank you,    Internist Shelia [facility]

## 2019-01-14 NOTE — TELEPHONE ENCOUNTER
Regarding: RE:José Miguelt After Visit Follow-Up Message. Contact: [phone]  ----- Message from [address] [place] [address] [address], [facility] sent at 1/12/2019  7:40 PM EST -----    Would you call in some trazodone for me to try? [facility] [address] [facility]. [phone]  ----- Message -----  From: Marcin Witt MD  Sent: 1/10/2019  5:04 PM EST  To: Abraham Castro  Subject: RE:MyChart After Visit Follow-Up Message. Has trazodone been tried in the past - may actually do well on this    ----- Message -----     From: Abraham Castro     Sent: 1/4/2019  1:35 PM EST       To: Marcin Witt MD  Subject: RE:José Miguelt After Visit Follow-Up Message. Dr Marisol Jha --    What other sleep aid would you recommend that I can take that will go well with the [address]?    ----- Message -----  From: Marcin Witt MD  Sent: 12/14/2018  4:57 PM EST  To: Abraham Castro  Subject: Segovia Low After Visit Follow-Up Message. Ideally you don't take them together but we understand the reasoning  Pain mgmt doesn't want anything concurrent with the pain meds that can suppress the breathing.      ----- Message -----     From: Abraham Castro     Sent: 12/13/2018  6:32 PM EST       To: Marcin Witt MD  Subject: RE:MyCrait After Visit Follow-Up Message. I  forgot to ask Dr. Marisol Jha about taking a 10 mg valium at times, only at night when I have a problem sleeping. It is my back that causes this issue, and makes the next day not so good. Pain Management does not like taking valium & Norco, I do understand. I am asking is there anything out there that would maybe work the same way and be alright? I've tried sleeping med. and did not work very good for me, can't remember why. The valium does work, after taking one, within a few minutes I fall to sleep for the rest of the night, works for me but not the system :-) Hope this is easy I don't mean to be a pain. . I do feel the new way to eat will do wonders for some folks.  I have lost 4 lbs in 3 days, easy so far. THANKS for ALL you DO!  ----- Message -----  From: Amy Su MD  Sent: 12/13/2018 12:00 AM EST  To: Abraham Avendano  Subject: [employer] After Visit Follow-Up Message. Barbara Mr. Kym Weller,    Thank you for your recent visit to [address] [place] [address]. Your health is important to us and we are privileged to be your healthcare provider and partner. If you have follow-up questions regarding your treatment plan from todays visit, please contact us through the [employer] Patient Portal by replying to this message. In the near future, you may receive a survey about your experience with us. We hope you will take the time to let us know how we did so we can continue to improve the care you receive.       Thank you,    Internist Shelia [facility]

## 2019-02-06 ENCOUNTER — TELEPHONE (OUTPATIENT)
Dept: INTERNAL MEDICINE CLINIC | Age: 64
End: 2019-02-06

## 2019-02-06 NOTE — TELEPHONE ENCOUNTER
PA was approved (That I can see on cover my meds) but it may be handled/faxed by other staff members

## 2019-02-06 NOTE — TELEPHONE ENCOUNTER
Brianna Daniels at [facility], needs prior auth on testosterone.  [phone] is number to call for the approval.

## 2019-02-06 NOTE — TELEPHONE ENCOUNTER
Completed the questions on Cover My Meds. Addendum: What I sent to Cover My Meds was changed, disregard my message. His Prior Auth was sent by other staff.

## 2019-04-17 RX ORDER — ATORVASTATIN CALCIUM 10 MG/1
TABLET, FILM COATED ORAL
Qty: 90 TAB | Refills: 3 | Status: SHIPPED | OUTPATIENT
Start: 2019-04-17 | End: 2020-06-18

## 2019-04-22 ENCOUNTER — TELEPHONE (OUTPATIENT)
Dept: INTERNAL MEDICINE CLINIC | Age: 64
End: 2019-04-22

## 2019-04-22 DIAGNOSIS — G47.00 INSOMNIA, UNSPECIFIED TYPE: Primary | ICD-10-CM

## 2019-04-22 RX ORDER — TEMAZEPAM 15 MG/1
15 CAPSULE ORAL
Qty: 30 CAP | Refills: 2 | Status: SHIPPED | OUTPATIENT
Start: 2019-04-22 | End: 2019-09-15 | Stop reason: SDUPTHER

## 2019-04-22 NOTE — TELEPHONE ENCOUNTER
Regarding: Prescription Question  Contact: [phone]  ----- Message from [address] [place] [address] [address], Generic sent at 4/19/2019  6:30 PM EDT -----    Barbara. Padmaja Gearing Padmaja Gearing I want to let Dr. Natalie Andrade know that eating twice a day is AWESOME for me. Can't wait for blood work in June, I have loss 15 lbs. so far and BP nos. have gone DOWN. Doesn't sound like much, but for a small frame person like me it is awesome, but my issue is not sleeping very well. I feel like I have this \"get-up-& go\" energy at 1:00 AM, but feel-it the next day and a nap during the day . .I have not every been good at that. Trazodone isn't working, across the [facility] isn't either. I would like to try something that would let me get at least 8 hours of sleep. this doesn't happen anymore, but when I do get 5 or 6 hours I feel GREAT the next day. I will come in and see him if needed.  THANK YOU ALL for what you DO!!!!!

## 2019-05-10 DIAGNOSIS — E29.1 HYPOGONADISM IN MALE: ICD-10-CM

## 2019-05-10 RX ORDER — TESTOSTERONE CYPIONATE 100 MG/ML
100 INJECTION, SOLUTION INTRAMUSCULAR
Qty: 10 ML | Refills: 5 | OUTPATIENT
Start: 2019-05-10 | End: 2019-11-26 | Stop reason: SDUPTHER

## 2019-05-10 NOTE — TELEPHONE ENCOUNTER
[facility] date for Depo-Testosterone as 19 for a 90 d/s. Last Visit: 12/10/18 with MD John Gómez  Next Appointment: 19 with MD John Gómez  Previous Refill Encounter(s): 18 #10 with 5 refills    Requested Prescriptions     Pending Prescriptions Disp Refills    testosterone cypionate (DEPO-TESTOSTERONE) 100 mg/mL injection 10 mL 5     Si mg by IntraMUSCular route every ten (10) days.

## 2019-06-07 ENCOUNTER — TELEPHONE (OUTPATIENT)
Dept: INTERNAL MEDICINE CLINIC | Age: 64
End: 2019-06-07

## 2019-06-07 DIAGNOSIS — R73.01 IFG (IMPAIRED FASTING GLUCOSE): ICD-10-CM

## 2019-06-07 DIAGNOSIS — I10 ESSENTIAL HYPERTENSION: ICD-10-CM

## 2019-06-07 DIAGNOSIS — E66.3 OVERWEIGHT (BMI 25.0-29.9): ICD-10-CM

## 2019-06-07 DIAGNOSIS — E29.1 HYPOGONADISM IN MALE: Primary | ICD-10-CM

## 2019-06-07 DIAGNOSIS — E78.5 DYSLIPIDEMIA: ICD-10-CM

## 2019-06-07 NOTE — TELEPHONE ENCOUNTER
Regarding: Test Results Question  Contact: [phone]  ----- Message from [address] [place] [address] [address], Generic sent at 6/6/2019  7:10 PM EDT -----    Just wondering if you will or have sent in a new request for blood work? The reason I am asking is in the past the request was only good for six months and with a later appointment it will be past that date. I can come to your office and do the blood work If you would like. Just let me know, I just want Dr. Bobo Lim to see how good or not so good I am doing.       Thank You Very Much!!!!!

## 2019-06-28 ENCOUNTER — HOSPITAL ENCOUNTER (OUTPATIENT)
Dept: LAB | Age: 64
Discharge: HOME | End: 2019-06-28
Payer: COMMERCIAL

## 2019-06-28 DIAGNOSIS — R73.01 IFG (IMPAIRED FASTING GLUCOSE): ICD-10-CM

## 2019-06-28 DIAGNOSIS — E66.3 OVERWEIGHT (BMI 25.0-29.9): ICD-10-CM

## 2019-06-28 DIAGNOSIS — E29.1 HYPOGONADISM IN MALE: ICD-10-CM

## 2019-06-28 DIAGNOSIS — I10 ESSENTIAL HYPERTENSION: ICD-10-CM

## 2019-06-28 DIAGNOSIS — E78.5 DYSLIPIDEMIA: ICD-10-CM

## 2019-06-28 LAB
ANION GAP SERPL CALC-SCNC: 4 MMOL/L (ref 3–18)
BUN SERPL-MCNC: 17 MG/DL (ref 7–18)
BUN/CREAT SERPL: 17 (ref 12–20)
CALCIUM SERPL-MCNC: 9.2 MG/DL (ref 8.5–10.1)
CHLORIDE SERPL-SCNC: 105 MMOL/L (ref 100–108)
CHOLEST SERPL-MCNC: 156 MG/DL
CO2 SERPL-SCNC: 32 MMOL/L (ref 21–32)
CREAT SERPL-MCNC: 0.98 MG/DL (ref 0.6–1.3)
ERYTHROCYTE [DISTWIDTH] IN BLOOD BY AUTOMATED COUNT: 12.9 % (ref 11.6–14.5)
GLUCOSE SERPL-MCNC: 100 MG/DL (ref 74–99)
HBA1C MFR BLD: 5.4 % (ref 4.2–5.6)
HCT VFR BLD AUTO: 44.1 % (ref 36–48)
HDLC SERPL-MCNC: 51 MG/DL (ref 40–60)
HDLC SERPL: 3.1 {RATIO} (ref 0–5)
HGB BLD-MCNC: 15.2 G/DL (ref 13–16)
LDLC SERPL CALC-MCNC: 79.8 MG/DL (ref 0–100)
LIPID PROFILE,FLP: NORMAL
MCH RBC QN AUTO: 31 PG (ref 24–34)
MCHC RBC AUTO-ENTMCNC: 34.5 G/DL (ref 31–37)
MCV RBC AUTO: 90 FL (ref 74–97)
PLATELET # BLD AUTO: 212 K/UL (ref 135–420)
PMV BLD AUTO: 11.1 FL (ref 9.2–11.8)
POTASSIUM SERPL-SCNC: 4.4 MMOL/L (ref 3.5–5.5)
PSA SERPL-MCNC: 0.8 NG/ML (ref 0–4)
RBC # BLD AUTO: 4.9 M/UL (ref 4.7–5.5)
SODIUM SERPL-SCNC: 141 MMOL/L (ref 136–145)
TRIGL SERPL-MCNC: 126 MG/DL (ref ?–150)
VLDLC SERPL CALC-MCNC: 25.2 MG/DL
WBC # BLD AUTO: 6.7 K/UL (ref 4.6–13.2)

## 2019-06-28 PROCEDURE — 80061 LIPID PANEL: CPT

## 2019-06-28 PROCEDURE — 80048 BASIC METABOLIC PNL TOTAL CA: CPT

## 2019-06-28 PROCEDURE — 36415 COLL VENOUS BLD VENIPUNCTURE: CPT

## 2019-06-28 PROCEDURE — 85027 COMPLETE CBC AUTOMATED: CPT

## 2019-06-28 PROCEDURE — 84403 ASSAY OF TOTAL TESTOSTERONE: CPT

## 2019-06-28 PROCEDURE — 84153 ASSAY OF PSA TOTAL: CPT

## 2019-06-28 PROCEDURE — 83036 HEMOGLOBIN GLYCOSYLATED A1C: CPT

## 2019-06-29 LAB — TESTOST SERPL-MCNC: 333 NG/DL (ref 264–916)

## 2019-07-04 NOTE — PROGRESS NOTES
58 y.o. WHITE OR  male who presents for RPE    He continues to see pain mgmt and the meds continue to allow him to function with adls. He's active and doing the [employer] 4x/week on average. No cardiovascular complaints. He brings in readings for review and averaging in the 120-130s over 70s. Interestingly, he has elev readings in the office and also at pain mgmt. asymptomatic    No GI or  complaints. Continues on testosterone replacement and is happy w the results. No polyuria, polydipsia, nocturia, vision change, not checking sugars at this time. He started IF at the visit and is very pleased with the 11% wt loss he has garnered thus far    Vitals 7/9/2019 12/10/2018 10/3/2018 9/17/2018 7/25/2018   Weight 171 lb 194 lb 196 lb 189 lb 9.6 oz 192 lb     He dopes report about 6+ weeks of pain/discomfort in the left trapezius area. He doesn't think it's a muscle spasm, no radiation into the left arm, no associated weakness, paresthesia    IF 12/18    Past Medical History:   Diagnosis Date    Chronic back pain     Dr. Irving Royal Dyslipidemia     Erectile dysfunction     Fatty liver     US 2002, serologies negative; fib-4 1.06 from 3/14    FHx: colon cancer     H/O bone scan 3/14    negative outside of djd    HTN (hypertension)     Hypogonadism male     negative pituitary MRI 2013    Internal hemorrhoids 11/15    Dr Roddy Andre    Lower urinary tract symptoms (LUTS) 5/16    mild    Lumbago     MRI 1/14 and CT 3/14 w lumbar spinal stenosis severe    Lumbar post-laminectomy syndrome     Lumbar spinal stenosis     Overweight (BMI 25.0-29. 9)     IF 12/18 start weight 194 lbs    Prediabetes     2 hr gtt 124 from 10/11    Rosacea     S/P lumbar spinal fusion     Spasm of muscle     Status post lumbar laminectomy     Thoracic or lumbosacral neuritis or radiculitis, unspecified      Past Surgical History:   Procedure Laterality Date    CHEST SURGERY PROCEDURE UNLISTED      resection of a RUL lung lesion for recurring polyps 1980s    HX CATARACT REMOVAL  09/2014    Dr Karmen Walters right     HX COLONOSCOPY      negative Dr. Gonsalo Ruiz 2005, 2010; Dr Carline Cabot 11/15 hemorrhoids   [facility]      Dr Everardo Crespo 10/17, L 2/18    Kinjal Din  2012    Dr. Shannan Jama right buttock    HX LUMBAR LAMINECTOMY       Social History     Socioeconomic History    Marital status:      Spouse name: Not on file    Number of children: 3    Years of education: Not on file    Highest education level: Not on file   Occupational History    Occupation: [employer]   Social Needs    Financial resource strain: Not on file    Food insecurity:     Worry: Not on file     Inability: Not on file   Avvo needs:     Medical: Not on file     Non-medical: Not on file   Tobacco Use    Smoking status: Former Smoker     Types: Cigarettes    Smokeless tobacco: Never Used   Substance and Sexual Activity    Alcohol use:  Yes     Alcohol/week: 0.0 oz     Comment: social    Drug use: No    Sexual activity: Not on file   Lifestyle    Physical activity:     Days per week: Not on file     Minutes per session: Not on file    Stress: Not on file   Relationships    Social connections:     Talks on phone: Not on file     Gets together: Not on file     Attends Voodoo service: Not on file     Active member of club or organization: Not on file     Attends meetings of clubs or organizations: Not on file     Relationship status: Not on file    Intimate partner violence:     Fear of current or ex partner: Not on file     Emotionally abused: Not on file     Physically abused: Not on file     Forced sexual activity: Not on file   Other Topics Concern    Not on file   Social History Narrative    Not on file     Allergies   Allergen Reactions    Lisinopril Other (comments)     ED      Current Outpatient Medications   Medication Sig    vardenafil (LEVITRA) 20 mg tablet Take 20 mg by mouth as needed (ED).  predniSONE (STERAPRED DS) 10 mg dose pack See administration instruction per 10mg dose pack    testosterone cypionate (DEPO-TESTOSTERONE) 100 mg/mL injection 100 mg by IntraMUSCular route every ten (10) days.  temazepam (RESTORIL) 15 mg capsule Take 1 Cap by mouth nightly as needed for Sleep. Max Daily Amount: 15 mg.    atorvastatin (LIPITOR) 10 mg tablet take 1 tablet by mouth once daily    traZODone (DESYREL) 50 mg tablet Take 1 Tab by mouth nightly.  Needle, Disp, 18 G (BD REGULAR BEVEL NEEDLES) 18 gauge x 1 1/2\" ndle USE TO DRAW UP TESTOSTERONE AND THEN CHANGE NEEDLE TO GIVE TESTOSTERONE    losartan-hydroCHLOROthiazide (HYZAAR) 100-25 mg per tablet take 1 tablet by mouth once daily    Syringe, Disposable, 3 mL syrg Pt to use with Testosterone injections.  HYDROcodone-acetaminophen (NORCO)  mg tablet Take half to 1 tablet up to 5 times daily as needed for chronic, severe pain. Indications: Pain    BD REGULAR BEVEL NEEDLES 18 gauge x 1 1/2\" ndle USE TO DRAW UP TESTOSTERONE AND THEN CHANGE NEEDLE TO GIVE TESTOSTERONE    omega-3 fatty acids-vitamin e (FISH OIL) 1,000 mg Cap Take 1 Cap by mouth.  MULTIVITS/IRON FUM/FA/D3/LYCOP (MULTI FOR HIM PO) Take  by mouth.  naloxone 4 mg/actuation spry 4 mg by Nasal route once as needed (opioid overdose) for up to 1 dose. May substitute 2 mg syringe if insurance requires    HYDROcodone-acetaminophen (NORCO)  mg tablet Take 0.5-1 Tabs by mouth every four (4) hours as needed for Pain (chronic) for up to 30 days. Indications: Pain     No current facility-administered medications for this visit.       REVIEW OF SYSTEMS: colo 11/15 Dr Diana Hay, sees Dr Shen Manual no vision change or eye pain  Oral  no mouth pain, tongue or tooth problems  Ears  no hearing loss, ear pain, fullness, no swallowing problems  Cardiac  no CP, PND, orthopnea, edema, palpitations or syncope  Chest  no breast masses  Resp  no wheezing, chronic coughing, dyspnea  GI  no heartburn, nausea, vomiting, change in bowel habits, bleeding, hemorrhoids  Urinary  no dysuria, hematuria, flank pain, urgency, frequency  Endo - no polyuria, polydipsia, nocturia, hot flashes    Visit Vitals  /90   Pulse 74   Temp 98.6 °F (37 °C) (Oral)   Resp 14   Ht 5' 10\" (1.778 m)   Wt 171 lb (77.6 kg)   SpO2 95%   BMI 24.54 kg/m²     Affect is appropriate. Mood stable  No apparent distress  HEENT --Anicteric sclerae. No thyromegaly, JVD, or bruits. Neck supple w from. No clear areas of soft tissue or bony tenderness in the neck/trapezius area  Lungs --Clear to auscultation, normal percussion. Heart --Regular rate and rhythm, no murmurs, rubs, gallops, or clicks. Chest wall --Nontender to palpation. PMI normal.  Abdomen -- Soft and nontender, no hepatosplenomegaly or masses. Extremities -- Without cyanosis, clubbing, edema. 2+ pulses equally and bilaterally.   Rectal showed guaiac neg brown stool, normal tone  Prostate without asymmetry, nodularity, tenderness, about 30 gm    LABS  From 10/11 showed        hba1c 5.8, ldl-p 1008, chol 197, tg 200, hdl 45, ldl-c 112,                   psa 0.70, 2 hr   From 6/12  showed  gluc 104, cr 0.94, gfr 90,  alt 20, hba1c 5.7, ldl-p 2204, chol 197, tg 225, hdl 45, ldl-c 107,  tsh 0.62  From 11/12 showed gluc 100, cr 0.94, gfr 89                                ldl-p 1356                       test 316  From 5/13  showed  gluc 95,   cr 0.86                         hba1c 5.6, ldl-p 1183,  chol 147, tg 171, hdl 53, ldl-c 60,            test 313  From 5/13 showed                                test 196, lh 3.0, psa 0.60  From 11/13 showed        hba1c 5.5,     chol 164, tg 165, hdl 47, ldl-c 84  From 3/14 showed   gluc 91,   cr 0.83, gfr 96,  alt 42,      chol 151, tg 160, hdl 42, ldl-c 77,   wbc 7.7,   hb 15.2, plt 249,          psa 0.60, esr 2, spep neg, cea 0.9, crp 1.30, IL-6 1.2  From 5/14 showed   gluc 156, cr 1.02, gfr>60, alt 61,                 wbc 12.8, hb 14.6, plt 231, b12 722, fol>24(on pred)  From 10/14 showed gluc 101, cr 0.90, gfr 93,  alt 45, hba1c 5.7  From 5/15 showed        hba1c 5.7,     chol 155, tg 157, hdl 41, ldl-c 83,   wbc 6.6,   hb 14.6, plt 191, test 103,         psa 0.80  From 11/15 showed        hba1c 5.6,     chol 152, tg 159, hdl 49, ldl-c 81,          test 228  From 5/16 showed           chol 142, tg 118, hdl 38, ldl-c 80,   wbc 6.8,   hb 15.7, plt 203, test 395,         psa 0.9  From 11/16 showed gluc 103, cr 1.14, gfr>60, alt 73,                 test 346  From 5/17 showed           chol 139, tg 182, hdl 45, ldl-c 58,   wbc 7.2,   hb 15.7, plt 217,          psa 0.60, hep c neg  From 11/17 showed gluc 92,  cr 1.00l, gfr>60, alt 59, hba1c 5.4,                test 884  From 6/18 showed          chol 153, tg 163, hdl 44, ldl-c 76,   wbc 6.1,   hb 15.3, plt 206, test 382,         psa 0.70  From 12/18 showed gluc 108, cr 1.01, gfr>60, alt 55, hba1c 5.5,                test 466    Results for orders placed or performed during the hospital encounter of 06/28/19   CBC W/O DIFF   Result Value Ref Range    WBC 6.7 4.6 - 13.2 K/uL    RBC 4.90 4.70 - 5.50 M/uL    HGB 15.2 13.0 - 16.0 g/dL    HCT 44.1 36.0 - 48.0 %    MCV 90.0 74.0 - 97.0 FL    MCH 31.0 24.0 - 34.0 PG    MCHC 34.5 31.0 - 37.0 g/dL    RDW 12.9 11.6 - 14.5 %    PLATELET 261 387 - 044 K/uL    MPV 11.1 9.2 - 11.8 FL   HEMOGLOBIN A1C W/O EAG   Result Value Ref Range    Hemoglobin A1c 5.4 4.2 - 5.6 %   TESTOSTERONE, TOTAL, ADULT MALE   Result Value Ref Range    Testosterone 333 264 - 916 ng/dL   LIPID PANEL   Result Value Ref Range    LIPID PROFILE          Cholesterol, total 156 <200 MG/DL    Triglyceride 126 <150 MG/DL    HDL Cholesterol 51 40 - 60 MG/DL    LDL, calculated 79.8 0 - 100 MG/DL    VLDL, calculated 25.2 MG/DL    CHOL/HDL Ratio 3.1 0 - 5.0     METABOLIC PANEL, BASIC   Result Value Ref Range    Sodium 141 136 - 145 mmol/L    Potassium 4.4 3.5 - 5.5 mmol/L    Chloride 105 100 - 108 mmol/L    CO2 32 21 - 32 mmol/L    Anion gap 4 3.0 - 18 mmol/L    Glucose 100 (H) 74 - 99 mg/dL    BUN 17 7.0 - 18 MG/DL    Creatinine 0.98 0.6 - 1.3 MG/DL    BUN/Creatinine ratio 17 12 - 20      GFR est AA >60 >60 ml/min/1.73m2    GFR est non-AA >60 >60 ml/min/1.73m2    Calcium 9.2 8.5 - 10.1 MG/DL   PSA, DIAGNOSTIC (PROSTATE SPECIFIC AG)   Result Value Ref Range    Prostate Specific Ag 0.8 0.0 - 4.0 ng/mL     Patient Active Problem List   Diagnosis Code    Dyslipidemia E78.5    S/P lumbar spinal fusion Z98.1    Hypogonadism male E29.1    Erectile dysfunction N52.9    IFG (impaired fasting glucose) R73.01    Primary hypertension I10    Chronic pain syndrome G89.4    Spondylosis of lumbar region without myelopathy or radiculopathy M47.816    Encounter for long-term (current) drug use Z79.899    Lumbar radiculitis M54.16     ASSESSMENT AND PLAN:  1. Hypertension. Continue current regimen. Possibly white coat? He will check outside using other machines  2. Fatty liver. Wt loss, exercise, abstinence reiterated  3. Dyslipidemia. Continue current regimen. 4.  FH colon ca. F/U colo 2020  5. Prediabetes. Wt loss as below  6. ED. Refilled levitra  7. Hypogonadism. Continue current regimen. 8.  Chronic pain. F/U pain clinic  9. Overweight. Congratulated him on the wt loss  10. Cervical radiculitis? Empiric steroid, call w update      RTC 1/20    Above conditions discussed at length and patient vocalized understanding.   All questions answered to patient satisfaction

## 2019-07-09 ENCOUNTER — OFFICE VISIT (OUTPATIENT)
Dept: INTERNAL MEDICINE CLINIC | Age: 64
End: 2019-07-09

## 2019-07-09 VITALS
HEART RATE: 74 BPM | RESPIRATION RATE: 14 BRPM | TEMPERATURE: 98.6 F | SYSTOLIC BLOOD PRESSURE: 152 MMHG | DIASTOLIC BLOOD PRESSURE: 90 MMHG | WEIGHT: 171 LBS | BODY MASS INDEX: 24.48 KG/M2 | OXYGEN SATURATION: 95 % | HEIGHT: 70 IN

## 2019-07-09 DIAGNOSIS — M47.816 SPONDYLOSIS OF LUMBAR REGION WITHOUT MYELOPATHY OR RADICULOPATHY: ICD-10-CM

## 2019-07-09 DIAGNOSIS — E29.1 HYPOGONADISM MALE: ICD-10-CM

## 2019-07-09 DIAGNOSIS — R73.01 IFG (IMPAIRED FASTING GLUCOSE): ICD-10-CM

## 2019-07-09 DIAGNOSIS — E78.5 DYSLIPIDEMIA: ICD-10-CM

## 2019-07-09 DIAGNOSIS — G89.4 CHRONIC PAIN SYNDROME: ICD-10-CM

## 2019-07-09 DIAGNOSIS — M54.12 CERVICAL RADICULITIS: ICD-10-CM

## 2019-07-09 DIAGNOSIS — Z00.00 PHYSICAL EXAM: Primary | ICD-10-CM

## 2019-07-09 DIAGNOSIS — I10 ESSENTIAL HYPERTENSION: ICD-10-CM

## 2019-07-09 DIAGNOSIS — N52.9 ERECTILE DYSFUNCTION, UNSPECIFIED ERECTILE DYSFUNCTION TYPE: ICD-10-CM

## 2019-07-09 PROBLEM — E66.3 OVERWEIGHT (BMI 25.0-29.9): Status: RESOLVED | Noted: 2018-06-11 | Resolved: 2019-07-09

## 2019-07-09 RX ORDER — PREDNISONE 10 MG/1
TABLET ORAL
Qty: 21 TAB | Refills: 0 | Status: SHIPPED | OUTPATIENT
Start: 2019-07-09 | End: 2019-10-21

## 2019-07-09 RX ORDER — VARDENAFIL HYDROCHLORIDE 20 MG/1
20 TABLET ORAL AS NEEDED
Qty: 30 TAB | Refills: 11 | Status: SHIPPED | OUTPATIENT
Start: 2019-07-09 | End: 2020-05-14 | Stop reason: SDUPTHER

## 2019-07-09 NOTE — PROGRESS NOTES
Rajan Serna presents today for   Chief Complaint   Patient presents with    Physical     labs              Depression Screening:  3 most recent PHQ Screens 7/9/2019   Little interest or pleasure in doing things Not at all   Feeling down, depressed, irritable, or hopeless Not at all   Total Score PHQ 2 0       Learning Assessment:  Learning Assessment 6/18/2018   PRIMARY LEARNER Patient   HIGHEST LEVEL OF EDUCATION - PRIMARY LEARNER  [address] PRIMARY LEARNER NONE   CO-LEARNER CAREGIVER -   PRIMARY LANGUAGE ENGLISH    NEED -   LEARNER PREFERENCE PRIMARY DEMONSTRATION   LEARNING SPECIAL TOPICS -   ANSWERED BY patient   RELATIONSHIP SELF   ASSESSMENT COMMENT -       Abuse Screening:  Abuse Screening Questionnaire 7/9/2019   Do you ever feel afraid of your partner? N   Are you in a relationship with someone who physically or mentally threatens you? N   Is it safe for you to go home? Y       Fall Risk  Fall Risk Assessment, last 12 mths 7/9/2019   Able to walk? Yes   Fall in past 12 months? No           Coordination of Care:  1. Have you been to the ER, urgent care clinic since your last visit? Hospitalized since your last visit? no    2. Have you seen or consulted any other health care providers outside of the [address] [place] [address] since your last visit? Include any pap smears or colon screening.  no

## 2019-09-15 DIAGNOSIS — Z79.899 MEDICATION MANAGEMENT: Primary | ICD-10-CM

## 2019-09-15 DIAGNOSIS — G47.00 INSOMNIA, UNSPECIFIED TYPE: ICD-10-CM

## 2019-09-16 RX ORDER — TEMAZEPAM 15 MG/1
15 CAPSULE ORAL
Qty: 30 CAP | Refills: 2 | OUTPATIENT
Start: 2019-09-16 | End: 2019-10-21

## 2019-09-17 NOTE — TELEPHONE ENCOUNTER
Restoril called into [facility]'s voicemail (7:57am 9/17/19)      Do to regulations per Board of medicine in regards to controlled substances. Patient is required to do a urine drug screen  and a controlled substance agreement needs to be signed. Per verbal order from [address] a urine drug was ordered. Sent patient a [employer] message with above information.

## 2019-10-10 DIAGNOSIS — I10 ESSENTIAL HYPERTENSION: ICD-10-CM

## 2019-10-10 RX ORDER — LOSARTAN POTASSIUM AND HYDROCHLOROTHIAZIDE 25; 100 MG/1; MG/1
TABLET ORAL
Qty: 90 TAB | Refills: 3 | Status: SHIPPED | OUTPATIENT
Start: 2019-10-10 | End: 2020-01-22

## 2019-10-21 ENCOUNTER — OFFICE VISIT (OUTPATIENT)
Dept: ORTHOPEDIC SURGERY | Age: 64
End: 2019-10-21

## 2019-10-21 VITALS
RESPIRATION RATE: 15 BRPM | HEIGHT: 70 IN | TEMPERATURE: 98.7 F | OXYGEN SATURATION: 99 % | HEART RATE: 71 BPM | BODY MASS INDEX: 24.54 KG/M2 | SYSTOLIC BLOOD PRESSURE: 162 MMHG | DIASTOLIC BLOOD PRESSURE: 80 MMHG

## 2019-10-21 DIAGNOSIS — M54.2 NECK PAIN: ICD-10-CM

## 2019-10-21 DIAGNOSIS — G95.9 CERVICAL MYELOPATHY (HCC): ICD-10-CM

## 2019-10-21 DIAGNOSIS — Z98.1 HISTORY OF LUMBAR FUSION: ICD-10-CM

## 2019-10-21 DIAGNOSIS — M50.30 DDD (DEGENERATIVE DISC DISEASE), CERVICAL: Primary | ICD-10-CM

## 2019-10-21 NOTE — PATIENT INSTRUCTIONS
Cervical Disc Disease: Care Instructions Your Care Instructions Cervical disc disease results from damage, disease, or wear and tear to the discs between the bones (vertebra) in your neck. The discs act as shock absorbers for the spine and keep the spine flexible. When a disc is damaged, it can bulge out and press against the nerve roots or spinal cord. This is sometimes called a herniated or \"slipped disc. \" This pressure can cause pain and numbness or tingling in your arms and hands. It can also cause weakness in your legs. An accident can damage a disc and cause it to break open (rupture). Aging and hard physical work can also cause damage to cervical discs. The first treatments for cervical disc disease include physical therapy, special neck exercises, heat, and pain medicine. If these fail, your doctor may inject steroids and pain medicine into your neck. Surgery is usually done only if other treatments have not worked. Follow-up care is a key part of your treatment and safety. Be sure to make and go to all appointments, and call your doctor if you are having problems. It's also a good idea to know your test results and keep a list of the medicines you take. How can you care for yourself at home? · Take pain medicines exactly as directed. ? If the doctor gave you a prescription medicine for pain, take it as prescribed. ? If you are not taking a prescription pain medicine, ask your doctor if you can take an over-the-counter medicine. · Don't spend too long in one position. Take short breaks to move around and change positions. · Wear a seat belt and shoulder harness when you are in a car. · Sleep with a pillow under your head and neck that keeps your neck straight. · Follow your doctor's instructions for gentle neck-stretching exercises. · Do not smoke. Smoking can slow healing of your discs. If you need help quitting, talk to your doctor about stop-smoking programs and medicines. These can increase your chances of quitting for good. · Avoid strenuous work or exercise until your doctor says it is okay. When should you call for help? Call 911 anytime you think you may need emergency care. For example, call if: 
  · You are unable to move an arm or a leg at all.  
[facility] your doctor now or seek immediate medical care if: 
  · You have new or worse symptoms in your arms, legs, belly, or buttocks. Symptoms may include: 
? Numbness or tingling. ? Weakness. ? Pain.  
  · You lose bladder or bowel control.  
 Watch closely for changes in your health, and be sure to contact your doctor if: 
  · You do not get better as expected. Where can you learn more? Go to http://heldre-janeth.info/. Enter N118 in the search box to learn more about \"Cervical Disc Disease: Care Instructions. \" Current as of: June 26, 2019 Content Version: 12.2 © 4329-5403 [employer]. Care instructions adapted under license by [employer] (which disclaims liability or warranty for this information). If you have questions about a medical condition or this instruction, always ask your healthcare professional. [address] any warranty or liability for your use of this information.
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
## 2019-10-21 NOTE — PROGRESS NOTES
[facility] presents today for   Chief Complaint   Patient presents with    Neck Pain     NEW PATIENT       Is someone accompanying this pt? NO    Is the patient using any DME equipment during OV? NO    Depression Screening:  3 most recent PHQ Screens 10/21/2019   Little interest or pleasure in doing things Not at all   Feeling down, depressed, irritable, or hopeless Not at all   Total Score PHQ 2 0       Learning Assessment:  Learning Assessment 10/21/2019   PRIMARY LEARNER Patient   HIGHEST LEVEL OF EDUCATION - PRIMARY LEARNER  -   BARRIERS PRIMARY LEARNER -   CO-LEARNER CAREGIVER -   PRIMARY LANGUAGE ENGLISH    NEED -   LEARNER PREFERENCE PRIMARY DEMONSTRATION   LEARNING SPECIAL TOPICS -   ANSWERED BY PATIENT   RELATIONSHIP SELF   ASSESSMENT COMMENT -       Abuse Screening:  Abuse Screening Questionnaire 7/9/2019   Do you ever feel afraid of your partner? N   Are you in a relationship with someone who physically or mentally threatens you? N   Is it safe for you to go home? Y       Fall Risk  Fall Risk Assessment, last 12 mths 10/21/2019   Able to walk? Yes   Fall in past 12 months? No         Coordination of Care:  1. Have you been to the ER, urgent care clinic since your last visit? NO  Hospitalized since your last visit? NO    2. Have you seen or consulted any other health care providers outside of the [address] [place] [address] since your last visit? NO Include any pap smears or colon screening.  NO    Last  Checked 10/21/19

## 2019-10-22 ENCOUNTER — TELEPHONE (OUTPATIENT)
Dept: ORTHOPEDIC SURGERY | Age: 64
End: 2019-10-22

## 2019-10-22 NOTE — TELEPHONE ENCOUNTER
Pt called and stated that the MRI  Adam would like to know what kind of metal was used in his sx w/ MD Hamm Graces back on 5/6/14 . Needs to be advised on this before he can be caden for his MRI .        Joslny Brown can be reached at : [phone]

## 2019-10-22 NOTE — TELEPHONE ENCOUNTER
Left message with Malina informing that it was [employer]. Also contacted patient and informed him as well. Patient stated understanding, no further actions needed at this time.

## 2019-10-28 ENCOUNTER — DOCUMENTATION ONLY (OUTPATIENT)
Dept: ORTHOPEDIC SURGERY | Age: 64
End: 2019-10-28

## 2019-10-28 ENCOUNTER — HOSPITAL ENCOUNTER (OUTPATIENT)
Age: 64
Discharge: HOME | End: 2019-10-28
Attending: PHYSICAL MEDICINE & REHABILITATION
Payer: COMMERCIAL

## 2019-10-28 DIAGNOSIS — M50.30 DDD (DEGENERATIVE DISC DISEASE), CERVICAL: ICD-10-CM

## 2019-10-28 DIAGNOSIS — G95.9 CERVICAL MYELOPATHY (HCC): ICD-10-CM

## 2019-10-28 PROCEDURE — 72141 MRI NECK SPINE W/O DYE: CPT

## 2019-11-12 ENCOUNTER — HOSPITAL ENCOUNTER (OUTPATIENT)
Dept: LAB | Age: 64
Discharge: HOME | End: 2019-11-12
Payer: COMMERCIAL

## 2019-11-12 ENCOUNTER — OFFICE VISIT (OUTPATIENT)
Dept: ORTHOPEDIC SURGERY | Age: 64
End: 2019-11-12

## 2019-11-12 VITALS
OXYGEN SATURATION: 98 % | DIASTOLIC BLOOD PRESSURE: 96 MMHG | HEART RATE: 89 BPM | HEIGHT: 70 IN | SYSTOLIC BLOOD PRESSURE: 164 MMHG | BODY MASS INDEX: 24.77 KG/M2 | WEIGHT: 173 LBS

## 2019-11-12 DIAGNOSIS — M48.02 CERVICAL SPINAL STENOSIS: ICD-10-CM

## 2019-11-12 DIAGNOSIS — Z01.818 PRE-OP EVALUATION: ICD-10-CM

## 2019-11-12 DIAGNOSIS — G95.9 CERVICAL MYELOPATHY (HCC): Primary | ICD-10-CM

## 2019-11-12 LAB
ANION GAP SERPL CALC-SCNC: 5 MMOL/L (ref 3–18)
ATRIAL RATE: 67 BPM
BUN SERPL-MCNC: 15 MG/DL (ref 7–18)
BUN/CREAT SERPL: 15 (ref 12–20)
CALCIUM SERPL-MCNC: 9 MG/DL (ref 8.5–10.1)
CALCULATED P AXIS, ECG09: 64 DEGREES
CALCULATED R AXIS, ECG10: 53 DEGREES
CALCULATED T AXIS, ECG11: 62 DEGREES
CHLORIDE SERPL-SCNC: 103 MMOL/L (ref 100–111)
CO2 SERPL-SCNC: 32 MMOL/L (ref 21–32)
CREAT SERPL-MCNC: 0.97 MG/DL (ref 0.6–1.3)
DIAGNOSIS, 93000: NORMAL
ERYTHROCYTE [DISTWIDTH] IN BLOOD BY AUTOMATED COUNT: 12.7 % (ref 11.6–14.5)
GLUCOSE SERPL-MCNC: 89 MG/DL (ref 74–99)
HCT VFR BLD AUTO: 45.7 % (ref 36–48)
HGB BLD-MCNC: 15.7 G/DL (ref 13–16)
MCH RBC QN AUTO: 31 PG (ref 24–34)
MCHC RBC AUTO-ENTMCNC: 34.4 G/DL (ref 31–37)
MCV RBC AUTO: 90.3 FL (ref 74–97)
P-R INTERVAL, ECG05: 164 MS
PLATELET # BLD AUTO: 248 K/UL (ref 135–420)
PMV BLD AUTO: 10.6 FL (ref 9.2–11.8)
POTASSIUM SERPL-SCNC: 4.3 MMOL/L (ref 3.5–5.5)
Q-T INTERVAL, ECG07: 380 MS
QRS DURATION, ECG06: 100 MS
QTC CALCULATION (BEZET), ECG08: 401 MS
RBC # BLD AUTO: 5.06 M/UL (ref 4.7–5.5)
SODIUM SERPL-SCNC: 140 MMOL/L (ref 136–145)
VENTRICULAR RATE, ECG03: 67 BPM
WBC # BLD AUTO: 6.9 K/UL (ref 4.6–13.2)

## 2019-11-12 PROCEDURE — 93005 ELECTROCARDIOGRAM TRACING: CPT

## 2019-11-12 PROCEDURE — 85027 COMPLETE CBC AUTOMATED: CPT

## 2019-11-12 PROCEDURE — 80048 BASIC METABOLIC PNL TOTAL CA: CPT

## 2019-11-12 PROCEDURE — 36415 COLL VENOUS BLD VENIPUNCTURE: CPT

## 2019-11-12 NOTE — PROGRESS NOTES
[address] [facility]   Pre-Surgical Worksheet    Patient: Abraham Nava                         MRN: [id number]     Age:  71 y.o.,      Sex: male    YOB: 1955           CECE: November 12, 2019  PCP: Caryle Deck, MD    Allergies   Allergen Reactions    Lisinopril Other (comments)     ED         ICD-10-CM ICD-9-CM    1. Cervical myelopathy (HCC) G95.9 721.1    2. Cervical spinal stenosis M48.02 723.0        Surgery: ACDF C3/4, C4/5, C5/6. Pain Assessment   Pain Assessment  11/12/2019   Location of Pain Back;Neck   Severity of Pain 10   Quality of Pain Other (Comment)   Quality of Pain Comment stabbing, tingling, stabbing   Duration of Pain Persistent   Frequency of Pain Constant   Aggravating Factors Bending   Aggravating Factors Comment Sporadic   Limiting Behavior Yes   Relieving Factors NSAID   Result of Injury -       Visit Vitals  BP (!) 164/96 (BP 1 Location: Left arm, BP Patient Position: Sitting)   Pulse 89   Ht 5' 10\" (1.778 m)   Wt 173 lb (78.5 kg)   SpO2 98%   BMI 24.82 kg/m²       ADL Limits: Dressing    Spine Surgery?: Yes When 2013. Where Dr. Noris To    Spinal Injections?: Yes  When 2013. Where Dr. De Oliveira Ends. Physical Therapy?: No.    NSAID's?: NO    Pain Medications?: Yes  Type: [address] In Pain Management: YES, Where: Dr Deisy Salinas    Current Outpatient Medications   Medication Sig    losartan-hydroCHLOROthiazide (HYZAAR) 100-25 mg per tablet take 1 tablet by mouth once daily    vardenafil (LEVITRA) 20 mg tablet Take 20 mg by mouth as needed (ED).  testosterone cypionate (DEPO-TESTOSTERONE) 100 mg/mL injection 100 mg by IntraMUSCular route every ten (10) days.  atorvastatin (LIPITOR) 10 mg tablet take 1 tablet by mouth once daily    HYDROcodone-acetaminophen (NORCO)  mg tablet Take half to 1 tablet up to 5 times daily as needed for chronic, severe pain.   Indications: Pain    omega-3 fatty acids-vitamin e (FISH OIL) 1,000 mg Cap Take 1 Cap by mouth.    Needle, Disp, 18 G (BD REGULAR BEVEL NEEDLES) 18 gauge x 1 1/2\" ndle USE TO DRAW UP TESTOSTERONE AND THEN CHANGE NEEDLE TO GIVE TESTOSTERONE    Syringe, Disposable, 3 mL syrg Pt to use with Testosterone injections.  BD REGULAR BEVEL NEEDLES 18 gauge x 1 1/2\" ndle USE TO DRAW UP TESTOSTERONE AND THEN CHANGE NEEDLE TO GIVE TESTOSTERONE    naloxone 4 mg/actuation spry 4 mg by Nasal route once as needed (opioid overdose) for up to 1 dose. May substitute 2 mg syringe if insurance requires    HYDROcodone-acetaminophen (NORCO)  mg tablet Take 0.5-1 Tabs by mouth every four (4) hours as needed for Pain (chronic) for up to 30 days. Indications: Pain    MULTIVITS/IRON FUM/FA/D3/LYCOP (MULTI FOR HIM PO) Take  by mouth. No current facility-administered medications for this visit. Past Medical History:   Diagnosis Date    Chronic back pain     Dr. Ritika Sierra Dyslipidemia     Erectile dysfunction     Fatty liver     US 2002, serologies negative; fib-4 1.06 from 3/14    FHx: colon cancer     H/O bone scan 3/14    negative outside of djd    HTN (hypertension)     Hypogonadism male     negative pituitary MRI 2013    Internal hemorrhoids 11/15    Dr Antonio Gómez    Lower urinary tract symptoms (LUTS) 5/16    mild    Lumbago     MRI 1/14 and CT 3/14 w lumbar spinal stenosis severe    Lumbar post-laminectomy syndrome     Lumbar spinal stenosis     Overweight (BMI 25.0-29. 9)     IF 12/18 start weight 194 lbs    Prediabetes     2 hr gtt 124 from 10/11    Rosacea     S/P lumbar spinal fusion     Spasm of muscle     Status post lumbar laminectomy     Thoracic or lumbosacral neuritis or radiculitis, unspecified        Past Surgical History:   Procedure Laterality Date    CHEST SURGERY PROCEDURE UNLISTED      resection of a RUL lung lesion for recurring polyps 1980s    HX CATARACT REMOVAL  09/2014    Dr Herson Lloyd right     HX COLONOSCOPY      negative Dr. Chanda West 2005, 2010; Dr Juani Moise 11/15 hemorrhoids    HX CORNEAL TRANSPLANT      Dr Jasmine Cunningham 10/17, L 2/18    HX LIPOMA RESECTION  2012    Dr. Sneed Levels right buttock    HX LUMBAR FUSION  2014    L3/4 for cauda equina, Dr. Lillie Rojas History     Socioeconomic History    Marital status:      Spouse name: Not on file    Number of children: 3    Years of education: Not on file    Highest education level: Not on file   Occupational History    Occupation: [employer]   Tobacco Use    Smoking status: Former Smoker     Types: Cigarettes    Smokeless tobacco: Never Used   Substance and Sexual Activity    Alcohol use: Yes     Alcohol/week: 0.0 standard drinks     Comment: social    Drug use:  No

## 2019-11-12 NOTE — PROGRESS NOTES
[facility]  [address], [address]  [place], [address] [place] [address] Street  Phone: [phone]  Fax: [phone]  INITIAL CONSULTATION  Patient: Abraham Dias                MRN: [id number]       SSN: [id number]  YOB: 1955        AGE: 71 y.o. SEX: male  Body mass index is 24.82 kg/m². PCP: Alec Gardiner MD  11/12/19    No chief complaint on file. HISTORY OF PRESENT ILLNESS, RADIOGRAPHS, and PLAN:         HISTORY OF PRESENT ILLNESS:  Mr. Jyoti Carter is seen today at the request of Dr. Sam Verdugo and Dr. Shaylee Dobbs. Mr. Jyoti Carter is known to me. He is a 27-year-old gentleman who some years ago I did a decompression and fusion for a cauda equina syndrome from which he did well. He represents today with neck pain with radiating arm pain, numbness, and burning for months now. He has had multiple flares that are incapacitating. He gets weak and numb and especially has night pain. He denies bowel or bladder dysfunction, fever, chills, or night sweats, weight loss or weight gain. He gets shocking pains with head extension and flexion. He gets dysesthetic sensation in his hand but no gross weakness. PHYSICAL EXAMINATION:  He has no reflex change and no clonus, Real's, or Babinski. His gait appears to be reasonable. RADIOGRAPHS:  His MRI demonstrates a small, central disc at 3-4. C4-5 and C5-6 demonstrate uncinate spurring, disc protrusion, and cervical stenosis with some signal change in the cord around C5 on the right in his symptomatic side. ASSESSMENT/PLAN: I discussed the matter at length with him. Impressively, given the stenosis he has, he has no gross weakness, just severe pain. He clearly is having signal change in his cord and stenosis, acute on chronic progression with multiple flares. My recommendation would be a decompression and fusion. We certainly would have to do 4-5 and 5-6.   I think 3-4 has to be addressed because it is junctional with a central disc herniation. 6-7 is degenerative. Most likely, we can leave it alone. I discussed with him the risks, benefits, complications, and alternatives to surgery. The patient wishes to proceed. We will proceed with a cervical decompression and fusion once the appropriate approvals and clearances take place. cc: Cassandra Kyle M.D. Past Medical History:   Diagnosis Date    Chronic back pain     Dr. Moose Hernandes Dyslipidemia     Erectile dysfunction     Fatty liver     [place] 2002, serologies negative; fib-4 1.06 from 3/14    FHx: colon cancer     H/O bone scan 3/14    negative outside of djd    HTN (hypertension)     Hypogonadism male     negative pituitary MRI 2013    Internal hemorrhoids 11/15    Dr Elizabeth Rainey    Lower urinary tract symptoms (LUTS) 5/16    mild    Lumbago     MRI 1/14 and CT 3/14 w lumbar spinal stenosis severe    Lumbar post-laminectomy syndrome     Lumbar spinal stenosis     Overweight (BMI 25.0-29. 9)     IF 12/18 start weight 194 lbs    Prediabetes     2 hr gtt 124 from 10/11    Rosacea     S/P lumbar spinal fusion     Spasm of muscle     Status post lumbar laminectomy     Thoracic or lumbosacral neuritis or radiculitis, unspecified        Family History   Problem Relation Age of Onset    Colon Cancer Mother     Diabetes Mother     Cancer Father         H&N    Hypertension Father     Stroke Father        Current Outpatient Medications   Medication Sig Dispense Refill    losartan-hydroCHLOROthiazide (HYZAAR) 100-25 mg per tablet take 1 tablet by mouth once daily 90 Tab 3    vardenafil (LEVITRA) 20 mg tablet Take 20 mg by mouth as needed (ED). 30 Tab 11    testosterone cypionate (DEPO-TESTOSTERONE) 100 mg/mL injection 100 mg by IntraMUSCular route every ten (10) days.  10 mL 5    atorvastatin (LIPITOR) 10 mg tablet take 1 tablet by mouth once daily 90 Tab 3    HYDROcodone-acetaminophen (NORCO)  mg tablet Take half to 1 tablet up to 5 times daily as needed for chronic, severe pain. Indications: Pain 150 Tab 0    omega-3 fatty acids-vitamin e (FISH OIL) 1,000 mg Cap Take 1 Cap by mouth.  Needle, Disp, 18 G (BD REGULAR BEVEL NEEDLES) 18 gauge x 1 1/2\" ndle USE TO DRAW UP TESTOSTERONE AND THEN CHANGE NEEDLE TO GIVE TESTOSTERONE 25 Each 1    Syringe, Disposable, 3 mL syrg Pt to use with Testosterone injections. 25 Syringe 1    BD REGULAR BEVEL NEEDLES 18 gauge x 1 1/2\" ndle USE TO DRAW UP TESTOSTERONE AND THEN CHANGE NEEDLE TO GIVE TESTOSTERONE 25 Each 1    naloxone 4 mg/actuation spry 4 mg by Nasal route once as needed (opioid overdose) for up to 1 dose. May substitute 2 mg syringe if insurance requires 1 Package 0    HYDROcodone-acetaminophen (NORCO)  mg tablet Take 0.5-1 Tabs by mouth every four (4) hours as needed for Pain (chronic) for up to 30 days. Indications: Pain 180 Tab 0    MULTIVITS/IRON FUM/FA/D3/LYCOP (MULTI FOR HIM PO) Take  by mouth.          Allergies   Allergen Reactions    Lisinopril Other (comments)     ED       Past Surgical History:   Procedure Laterality Date    CHEST SURGERY PROCEDURE UNLISTED      resection of a RUL lung lesion for recurring polyps 1980s    HX CATARACT REMOVAL  09/2014    Dr Adelina Mccullough right     HX COLONOSCOPY      negative Dr. Jennie Tobar 2005, 2010; Dr Mani Venegas 11/15 hemorrhoids   Dary Mom      Dr Deya Son 10/17, L 2/18    Maryjane Serrano  2012    Dr. Allison Duran right buttock    HX LUMBAR FUSION  2014    L3/4 for cauda equina, Dr. Beryl Nichole       Past Medical History:   Diagnosis Date    Chronic back pain     Dr. Linda Wiggins Dyslipidemia     Erectile dysfunction     Fatty liver     [place] 2002, serologies negative; fib-4 1.06 from 3/14    FHx: colon cancer     H/O bone scan 3/14    negative outside of djd    HTN (hypertension)     Hypogonadism male     negative pituitary MRI 2013    Internal hemorrhoids 11/15    Dr Monique Russ urinary tract symptoms (LUTS) 5/16    mild  Lumbago     MRI 1/14 and CT 3/14 w lumbar spinal stenosis severe    Lumbar post-laminectomy syndrome     Lumbar spinal stenosis     Overweight (BMI 25.0-29. 9)     IF 12/18 start weight 194 lbs    Prediabetes     2 hr gtt 124 from 10/11    Rosacea     S/P lumbar spinal fusion     Spasm of muscle     Status post lumbar laminectomy     Thoracic or lumbosacral neuritis or radiculitis, unspecified        Social History     Socioeconomic History    Marital status:      Spouse name: Not on file    Number of children: 4    Years of education: Not on file    Highest education level: Not on file   Occupational History    Occupation: [employer]   Social Needs    Financial resource strain: Not on file    Food insecurity:     Worry: Not on file     Inability: Not on file   [employer] needs:     Medical: Not on file     Non-medical: Not on file   Tobacco Use    Smoking status: Former Smoker     Types: Cigarettes    Smokeless tobacco: Never Used   Substance and Sexual Activity    Alcohol use: Yes     Alcohol/week: 0.0 standard drinks     Comment: social    Drug use: No    Sexual activity: Not on file   Lifestyle    Physical activity:     Days per week: Not on file     Minutes per session: Not on file    Stress: Not on file   Relationships    Social connections:     Talks on phone: Not on file     Gets together: Not on file     Attends Congregational service: Not on file     Active member of club or organization: Not on file     Attends meetings of clubs or organizations: Not on file     Relationship status: Not on file    Intimate partner violence:     Fear of current or ex partner: Not on file     Emotionally abused: Not on file     Physically abused: Not on file     Forced sexual activity: Not on file   Other Topics Concern    Not on file   Social History Narrative    Not on file           REVIEW OF SYSTEMS:   CONSTITUTIONAL SYMPTOMS:  Negative. EYES:  Negative.    EARS, NOSE, THROAT AND MOUTH:  Negative. CARDIOVASCULAR:  Negative. RESPIRATORY:  Negative. GENITOURINARY: Per HPI. GASTROINTESTINAL:  Per HPI. INTEGUMENTARY (SKIN AND/OR BREAST):  Negative. MUSCULOSKELETAL: Per HPI.   ENDOCRINE/RHEUMATOLOGIC:  Negative. NEUROLOGICAL:  Per HPI. HEMATOLOGIC/LYMPHATIC:  Negative. ALLERGIC/IMMUNOLOGIC:  Negative. PSYCHIATRIC:  Negative. PHYSICAL EXAMINATION:   Visit Vitals  BP (!) 164/96 (BP 1 Location: Left arm, BP Patient Position: Sitting)   Pulse 89   Ht 5' 10\" (1.778 m)   Wt 173 lb (78.5 kg)   SpO2 98%   BMI 24.82 kg/m²    PAIN SCALE: 10 - Worst pain ever/10    CONSTITUTIONAL: The patient is in no apparent distress and is alert and oriented x 3. HEENT: Normocephalic. Hearing grossly intact. NECK: Supple and symmetric. no tenderness, or masses were felt. RESPIRATORY: No labored breathing. CARDIOVASCULAR: The carotid pulses were normal. Peripheral pulses were 2+. CHEST: Normal AP diameter and normal contour without any kyphoscoliosis. LYMPHATIC: No lymphadenopathy was appreciated in the neck, axillae or groin. SKIN:  Negative for scars, rashes, lesions, or ulcers on the right upper, right lower, left upper, left lower and trunk. NEUROLOGICAL: Alert and oriented x 3. Ambulation without assistive device. FWB. EXTREMITIES:  See musculoskeletal.  MUSCULOSKELETAL:   Head and Neck: Right sided neck pain. Negative for misalignment, asymmetry, crepitation, defects, tenderness masses or effusions.  Left Upper Extremity: Inspection, percussion and palpation performed. Reals sign is negative.  Right Upper Extremity: Weakness, pain. Inspection, percussion and palpation performed. Reals sign is negative.  Spine, Ribs and Pelvis: Inspection, percussion and palpation performed. Negative for misalignment, asymmetry, crepitation, defects, tenderness masses or effusions.  Left Lower Extremity: Inspection, percussion and palpation performed.    Negative straight leg raise.  Right Lower Extremity: Inspection, percussion and palpation performed. Negative straight leg raise. SPINE EXAM:     Cervical spine: Neck is midline. Normal muscle tone. No focal atrophy is noted. Lumbar spine: No rash, ecchymosis, or gross obliquity. No focal atrophy is noted. ASSESSMENT    ICD-10-CM ICD-9-CM    1. Cervical spinal stenosis M48.02 723.0        Written by Antonio Chavez, as dictated by Flower Cedeno MD.    I, Dr. Flower Cedeno MD, confirm that all documentation is accurate.

## 2019-11-13 ENCOUNTER — OFFICE VISIT (OUTPATIENT)
Dept: INTERNAL MEDICINE CLINIC | Age: 64
End: 2019-11-13

## 2019-11-13 VITALS
TEMPERATURE: 95.9 F | HEART RATE: 80 BPM | WEIGHT: 174 LBS | BODY MASS INDEX: 24.91 KG/M2 | RESPIRATION RATE: 14 BRPM | HEIGHT: 70 IN | OXYGEN SATURATION: 99 % | DIASTOLIC BLOOD PRESSURE: 82 MMHG | SYSTOLIC BLOOD PRESSURE: 151 MMHG

## 2019-11-13 DIAGNOSIS — I10 ESSENTIAL HYPERTENSION: Primary | ICD-10-CM

## 2019-11-13 DIAGNOSIS — Z01.818 PREOP EXAM FOR INTERNAL MEDICINE: ICD-10-CM

## 2019-11-13 DIAGNOSIS — M48.02 CERVICAL SPINAL STENOSIS: ICD-10-CM

## 2019-11-13 RX ORDER — AMLODIPINE BESYLATE 2.5 MG/1
2.5 TABLET ORAL DAILY
Qty: 30 TAB | Refills: 11 | Status: SHIPPED | OUTPATIENT
Start: 2019-11-13 | End: 2021-02-10

## 2019-11-13 RX ORDER — CYCLOBENZAPRINE HCL 10 MG
TABLET ORAL
Refills: 0 | COMMUNITY
Start: 2019-10-16 | End: 2019-12-13 | Stop reason: SDUPTHER

## 2019-11-13 NOTE — PROGRESS NOTES
71 y.o. WHITE OR  male who presents for med clearance    He will be undergoing cervical spine decompression and fusion by Dr Gris Paul on 12/5/19. He was active and doing the [employer] 4x/week on average. He then did something to his neck and has been having radiculitis for a few months now. Mri finally done which revealed spinal stenosis, he saw Dr Gris Paul and surgery recommended    He continues to see pain mgmt and the meds continue to allow him to function with adls. Of note, bp has been running high the last few visits. No cp, sob, pnd, edema, palpitations, syncope, headache, focal neuro complaints    No GI or  complaints. Continues on testosterone replacement and no complaints there. No polyuria, polydipsia, nocturia, vision change, not checking sugars at this time. IF 12/18    Past Medical History:   Diagnosis Date    Chronic back pain     Dr. Radha Tejeda Dyslipidemia     Erectile dysfunction     Fatty liver     [place] 2002, serologies negative; fib-4 1.06 from 3/14    FHx: colon cancer     H/O bone scan 3/14    negative outside of djd    HTN (hypertension)     Hypogonadism male     negative pituitary MRI 2013    Internal hemorrhoids 11/15    Dr Mercy Chao    Lower urinary tract symptoms (LUTS) 5/16    mild    Lumbago     MRI 1/14 and CT 3/14 w lumbar spinal stenosis severe    Lumbar post-laminectomy syndrome     Lumbar spinal stenosis     Overweight (BMI 25.0-29. 9)     IF 12/18 start weight 194 lbs    Prediabetes     2 hr gtt 124 from 10/11    Rosacea     S/P lumbar spinal fusion     Spasm of muscle     Status post lumbar laminectomy     Thoracic or lumbosacral neuritis or radiculitis, unspecified      Past Surgical History:   Procedure Laterality Date    CHEST SURGERY PROCEDURE UNLISTED      resection of a RUL lung lesion for recurring polyps 1980s    HX CATARACT REMOVAL  09/2014    Dr Carol Alcantara right     HX COLONOSCOPY      negative Dr. Marilyn Kirk 2005, 2010; Dr Jess Matthews 11/15 hemorrhoids    HX CORNEAL TRANSPLANT      Dr Yudith Oh 10/17, L 2/18    HX LIPOMA RESECTION  2012    Dr. Marium Guevara right buttock    HX LUMBAR FUSION  2014    L3/4 for cauda equina, Dr. Zuleta Talavera History     Socioeconomic History    Marital status:      Spouse name: Not on file    Number of children: 3    Years of education: Not on file    Highest education level: Not on file   Occupational History    Occupation: [employer]   Social Needs    Financial resource strain: Not on file    Food insecurity:     Worry: Not on file     Inability: Not on file   [employer] needs:     Medical: Not on file     Non-medical: Not on file   Tobacco Use    Smoking status: Former Smoker     Types: Cigarettes    Smokeless tobacco: Never Used   Substance and Sexual Activity    Alcohol use: Yes     Alcohol/week: 0.0 standard drinks     Comment: social    Drug use: No    Sexual activity: Not on file   Lifestyle    Physical activity:     Days per week: Not on file     Minutes per session: Not on file    Stress: Not on file   Relationships    Social connections:     Talks on phone: Not on file     Gets together: Not on file     Attends Sabianist service: Not on file     Active member of club or organization: Not on file     Attends meetings of clubs or organizations: Not on file     Relationship status: Not on file    Intimate partner violence:     Fear of current or ex partner: Not on file     Emotionally abused: Not on file     Physically abused: Not on file     Forced sexual activity: Not on file   Other Topics Concern    Not on file   Social History Narrative    Not on file     Allergies   Allergen Reactions    Lisinopril Other (comments)     ED      Current Outpatient Medications   Medication Sig    amLODIPine (NORVASC) 2.5 mg tablet Take 1 Tab by mouth daily.     losartan-hydroCHLOROthiazide (HYZAAR) 100-25 mg per tablet take 1 tablet by mouth once daily    Needle, Disp, 18 G (BD REGULAR BEVEL NEEDLES) 18 gauge x 1 1/2\" ndle USE TO DRAW UP TESTOSTERONE AND THEN CHANGE NEEDLE TO GIVE TESTOSTERONE    vardenafil (LEVITRA) 20 mg tablet Take 20 mg by mouth as needed (ED).  testosterone cypionate (DEPO-TESTOSTERONE) 100 mg/mL injection 100 mg by IntraMUSCular route every ten (10) days.  atorvastatin (LIPITOR) 10 mg tablet take 1 tablet by mouth once daily    Syringe, Disposable, 3 mL syrg Pt to use with Testosterone injections.  HYDROcodone-acetaminophen (NORCO)  mg tablet Take half to 1 tablet up to 5 times daily as needed for chronic, severe pain. Indications: Pain    BD REGULAR BEVEL NEEDLES 18 gauge x 1 1/2\" ndle USE TO DRAW UP TESTOSTERONE AND THEN CHANGE NEEDLE TO GIVE TESTOSTERONE    omega-3 fatty acids-vitamin e (FISH OIL) 1,000 mg Cap Take 1 Cap by mouth.  cyclobenzaprine (FLEXERIL) 10 mg tablet take 1 tablet by mouth three times a day if needed    naloxone 4 mg/actuation spry 4 mg by Nasal route once as needed (opioid overdose) for up to 1 dose. May substitute 2 mg syringe if insurance requires    HYDROcodone-acetaminophen (NORCO)  mg tablet Take 0.5-1 Tabs by mouth every four (4) hours as needed for Pain (chronic) for up to 30 days. Indications: Pain    MULTIVITS/IRON FUM/FA/D3/LYCOP (MULTI FOR HIM PO) Take  by mouth. No current facility-administered medications for this visit.       REVIEW OF SYSTEMS: colo 11/15 Dr Antonio Gómez, sees Dr Herson Lloyd  Ophtho - no vision change or eye pain  Oral - no mouth pain, tongue or tooth problems  Ears - no hearing loss, ear pain, fullness, no swallowing problems  Cardiac - no CP, PND, orthopnea, edema, palpitations or syncope  Chest - no breast masses  Resp - no wheezing, chronic coughing, dyspnea  GI - no heartburn, nausea, vomiting, change in bowel habits, bleeding, hemorrhoids  Urinary - no dysuria, hematuria, flank pain, urgency, frequency  Endo - no polyuria, polydipsia, nocturia, hot flashes    Visit Vitals  BP 151/82   Pulse 80   Temp 95.9 °F (35.5 °C) (Oral)   Resp 14   Ht 5' 10\" (1.778 m)   Wt 174 lb (78.9 kg)   SpO2 99%   BMI 24.97 kg/m²     Affect is appropriate. Mood stable  No apparent distress  HEENT --Anicteric sclerae. No thyromegaly, JVD, or bruits. Neck supple w from. No clear areas of soft tissue or bony tenderness in the neck/trapezius area  Lungs --Clear to auscultation, normal percussion. Heart --Regular rate and rhythm, no murmurs, rubs, gallops, or clicks. Chest wall --Nontender to palpation. PMI normal.  Abdomen -- Soft and nontender, no hepatosplenomegaly or masses. Extremities -- Without cyanosis, clubbing, edema. 2+ pulses equally and bilaterally.     LABS  From 10/11 showed        hba1c 5.8, ldl-p 1008, chol 197, tg 200, hdl 45, ldl-c 112,                   psa 0.70, 2 hr   From 6/12  showed  gluc 104, cr 0.94, gfr 90,  alt 20, hba1c 5.7, ldl-p 2204, chol 197, tg 225, hdl 45, ldl-c 107,  tsh 0.62  From 11/12 showed gluc 100, cr 0.94, gfr 89                                ldl-p 1356                             test 316  From 5/13  showed  gluc 95,   cr 0.86                         hba1c 5.6, ldl-p 1183,  chol 147, tg 171, hdl 53, ldl-c 60,            test 313  From 5/13 showed                                test 196, lh 3.0, psa 0.60  From 11/13 showed        hba1c 5.5,     chol 164, tg 165, hdl 47, ldl-c 84  From 3/14 showed   gluc 91,   cr 0.83, gfr 96,  alt 42,      chol 151, tg 160, hdl 42, ldl-c 77,   wbc 7.7,   hb 15.2, plt 249,          psa 0.60, esr 2, spep neg, cea 0.9, crp 1.30, IL-6 1.2  From 5/14 showed   gluc 156, cr 1.02, gfr>60, alt 61,                 wbc 12.8, hb 14.6, plt 231, b12 722, fol>24(on pred)  From 10/14 showed gluc 101, cr 0.90, gfr 93,  alt 45, hba1c 5.7  From 5/15 showed        hba1c 5.7,     chol 155, tg 157, hdl 41, ldl-c 83,   wbc 6.6,   hb 14.6, plt 191, test 103,         psa 0.80  From 11/15 showed        hba1c 5.6,     chol 152, tg 159, hdl 49, ldl-c 81,          test 228  From 5/16 showed           chol 142, tg 118, hdl 38, ldl-c 80,   wbc 6.8,   hb 15.7, plt 203, test 395,         psa 0.9  From 11/16 showed gluc 103, cr 1.14, gfr>60, alt 73,                 test 346  From 5/17 showed           chol 139, tg 182, hdl 45, ldl-c 58,   wbc 7.2,   hb 15.7, plt 217,          psa 0.60, hep c neg  From 11/17 showed gluc 92,  cr 1.00l, gfr>60, alt 59, hba1c 5.4,                test 884  From 6/18 showed          chol 153, tg 163, hdl 44, ldl-c 76,   wbc 6.1,   hb 15.3, plt 206, test 382,         psa 0.70  From 12/18 showed gluc 108, cr 1.01, gfr>60, alt 55, hba1c 5.5,                test 466  From 7/19 showed   gluc 100, cr 0.98, gfr>60,       chol 156, tg 126, hdl 51, ldl-c 80,   wbc 6.7,   hb 15.2, plt 212, test 333,         psa 0.80    PREOP  From 11/19 showed gluc 89, cr 0.97, gfr>60, wbc 6.9, hb 15.7, plt 248    Results for orders placed or performed during the hospital encounter of 11/12/19   EKG, 12 LEAD, INITIAL   Result Value Ref Range    Ventricular Rate 67 BPM    Atrial Rate 67 BPM    P-R Interval 164 ms    QRS Duration 100 ms    Q-T Interval 380 ms    QTC Calculation (Bezet) 401 ms    Calculated P Axis 64 degrees    Calculated R Axis 53 degrees    Calculated T Axis 62 degrees    Diagnosis       Normal sinus rhythm  Normal ECG  When compared with ECG of 06-MAY-2014 22:51,  No significant change was found  Confirmed by Achilles Runner, MD, Deb Sykes ([id number]) on 11/12/2019 12:45:27 PM       Patient Active Problem List   Diagnosis Code    Dyslipidemia E78.5    S/P lumbar spinal fusion Z98.1    Hypogonadism male E29.1    Erectile dysfunction N52.9    IFG (impaired fasting glucose) R73.01    Primary hypertension I10    Chronic pain syndrome G89.4    Spondylosis of lumbar region without myelopathy or radiculopathy M47.816    Encounter for long-term (current) drug use Z79.899    Cervical spinal stenosis M48.02     ASSESSMENT AND PLAN:  1. Hypertension.   Added amlodipine and he will call in readings, sfx discussed  2. Fatty liver. Wt loss, exercise, abstinence reiterated  3. Dyslipidemia. Continue current regimen. 4.  FH colon ca. F/U colo 2020  5. Prediabetes. Wt loss as below  6. ED. Prn levitra  7. Hypogonadism. Continue current regimen. 8.  Chronic pain. F/U pain clinic  9. Overweight. Congratulated him on the wt loss  10. Spine. He is felt to be average risk for the planned procedure, no contraindications noted. Routine postop prophylaxis per protocol. RTC 1/20    Above conditions discussed at length and patient vocalized understanding.   All questions answered to patient satisfaction

## 2019-11-13 NOTE — PROGRESS NOTES
Clemente Alfaro presents today for   Chief Complaint   Patient presents with    Pre-op Exam     neck surgery scheduled for 12-5-19 with DR. Hatch              Depression Screening:  3 most recent PHQ Screens 10/21/2019   Little interest or pleasure in doing things Not at all   Feeling down, depressed, irritable, or hopeless Not at all   Total Score PHQ 2 0       Learning Assessment:  Learning Assessment 11/13/2019   PRIMARY LEARNER Patient   HIGHEST LEVEL OF EDUCATION - PRIMARY LEARNER  SOME COLLEGE   BARRIERS PRIMARY LEARNER NONE   CO-LEARNER CAREGIVER No   PRIMARY LANGUAGE ENGLISH    NEED -   LEARNER PREFERENCE PRIMARY DEMONSTRATION   LEARNING SPECIAL TOPICS -   ANSWERED BY patient   RELATIONSHIP SELF   ASSESSMENT COMMENT -       Abuse Screening:  Abuse Screening Questionnaire 7/9/2019   Do you ever feel afraid of your partner? N   Are you in a relationship with someone who physically or mentally threatens you? N   Is it safe for you to go home? Y       Fall Risk  Fall Risk Assessment, last 12 mths 10/21/2019   Able to walk? Yes   Fall in past 12 months? No           Coordination of Care:  1. Have you been to the ER, urgent care clinic since your last visit? Hospitalized since your last visit? no    2. Have you seen or consulted any other health care providers outside of the [address] [place] [address] since your last visit? Include any pap smears or colon screening. yes      Advance Directive:  1. Do you have an advance directive in place? Patient Reply:no    2. If not, would you like material regarding how to put one in place?  Patient Reply: no

## 2019-11-26 DIAGNOSIS — E29.1 HYPOGONADISM IN MALE: ICD-10-CM

## 2019-11-26 NOTE — TELEPHONE ENCOUNTER
[facility]  reports [facility] last [facility] date for Testosterone as 8/15/19 for a 90 d/s. Last Visit: 19 with MD Dirk Rodriguez  Next Appointment: 20 with MD Dirk Rodriguez  Previous Refill Encounter(s): 5/10/19 #10 with 5 refills    Requested Prescriptions     Pending Prescriptions Disp Refills    testosterone cypionate (DEPO-TESTOSTERONE) 100 mg/mL injection 10 mL 5     Si mg by IntraMUSCular route every ten (10) days.

## 2019-11-27 RX ORDER — TESTOSTERONE CYPIONATE 100 MG/ML
100 INJECTION, SOLUTION INTRAMUSCULAR
Qty: 10 ML | Refills: 5 | Status: SHIPPED | OUTPATIENT
Start: 2019-11-27 | End: 2020-01-07

## 2019-12-02 NOTE — H&P
Pre-Admission History and Physical    Patient: Abraham Kong   MRN: [id number]   SSN: [id number]   YOB: 1955   Age: 71 y.o. Sex: male     Patient scheduled for: ACDF C3/4, C4/5, C5/6. Date of surgery: 12/5/19. [facility] Surgeon: Rell Murphy MD    HPI:  Carlota Perry is a 71 y.o. male with a decompression and fusion for a cauda equina syndrome from which he did well. He represents today with neck pain with radiating arm pain, numbness, and burning for months now. He has had multiple flares that are incapacitating. He gets weak and numb and especially has night pain. He denies bowel or bladder dysfunction, fever, chills, or night sweats, weight loss or weight gain. He gets shocking pains with head extension and flexion. He gets dysesthetic sensation in his hand but no gross weakness. He reports a pain level of 10/10. MRI demonstrates a small, central disc at 3-4. C4-5 and C5-6 demonstrate uncinate spurring, disc protrusion, and cervical stenosis with some signal change in the cord around C5 on the right in his symptomatic side. This patient has failed the presurgical conservative treatments  including spinal block injections and medications. Pain has impacted the patient's functional ability to dress without pain. He is being admitted for surgical intervention.          Past Medical History:   Diagnosis Date    Arthritis     Chronic back pain     Dr. Rosemarie Posada Dyslipidemia     Erectile dysfunction     Fatty liver     US 2002, serologies negative; fib-4 1.06 from 3/14    FHx: colon cancer     H/O bone scan 3/14    negative outside of djd    HTN (hypertension)     Hypogonadism male     negative pituitary MRI 2013    Internal hemorrhoids 11/15    Dr Mohinder Cruz    Lower urinary tract symptoms (LUTS) 5/16    mild    Lumbago     MRI 1/14 and CT 3/14 w lumbar spinal stenosis severe    Lumbar post-laminectomy syndrome     Lumbar spinal stenosis  Overweight (BMI 25.0-29. 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 Catrachito Dimas 2005, 2010; Dr Raya Yoo 11/15 hemorrhoids   [place]      Dr Jenni Felix 10/17, L 2/18    [facility]  2012    Dr. Zenaida Ruth right buttock    HX LUMBAR FUSION  2014    L3/4 for cauda equina, Dr. Pacheco Padron     Family History   Problem Relation Age of Onset    Colon Cancer Mother     Diabetes Mother     Cancer Father         H&N    Hypertension Father     Stroke Father      Allergies   Allergen Reactions    Lisinopril Other (comments)     ED     Current Outpatient Medications   Medication Sig Dispense Refill    testosterone cypionate (DEPO-TESTOSTERONE) 100 mg/mL injection 100 mg by IntraMUSCular route every ten (10) days. 10 mL 5    cyclobenzaprine (FLEXERIL) 10 mg tablet take 1 tablet by mouth three times a day if needed  0    amLODIPine (NORVASC) 2.5 mg tablet Take 1 Tab by mouth daily.  30 Tab 11    losartan-hydroCHLOROthiazide (HYZAAR) 100-25 mg per tablet take 1 tablet by mouth once daily 90 Tab 3    Needle, Disp, 18 G (BD REGULAR BEVEL NEEDLES) 18 gauge x 1 1/2\" ndle USE TO DRAW UP TESTOSTERONE AND THEN CHANGE NEEDLE TO GIVE TESTOSTERONE 25 Each 1    vardenafil (LEVITRA) 20 mg tablet Take 20 mg by mouth as needed (ED). 30 Tab 11    atorvastatin (LIPITOR) 10 mg tablet take 1 tablet by mouth once daily 90 Tab 3    Syringe, Disposable, 3 mL syrg Pt to use with Testosterone injections. 25 Syringe 1    BD REGULAR BEVEL NEEDLES 18 gauge x 1 1/2\" ndle USE TO DRAW UP TESTOSTERONE AND THEN CHANGE NEEDLE TO GIVE TESTOSTERONE 25 Each 1    naloxone 4 mg/actuation spry 4 mg by Nasal route once as needed (opioid overdose) for up to 1 dose. May substitute 2 mg syringe if insurance requires 1 Package 0    HYDROcodone-acetaminophen (NORCO)  mg tablet Take 0.5-1 Tabs by mouth every four (4) hours as needed for Pain (chronic) for up to 30 days. Indications: Pain 180 Tab 0       ROS:  Denies chills, fever,night sweats,  bowel or bladder dysfunction, unexplained weight loss/weight gain, chest pain, sob or anxiety. Physical Examination    Gen: Well developed, well nourished 71 y.o. male   BP (!) 164/96 (BP 1 Location: Left arm, BP Patient Position: Sitting)   Pulse 89   Ht 5' 10\" (1.778 m)   Wt 173 lb (78.5 kg)   SpO2 98%   BMI 24.82 kg/m²    PAIN SCALE: 10 - Worst pain ever/10     CONSTITUTIONAL: The patient is in no apparent distress and is alert and oriented x 3. HEENT: Normocephalic. Hearing grossly intact. NECK: Supple and symmetric. no tenderness, or masses were felt. RESPIRATORY: No labored breathing. CARDIOVASCULAR: The carotid pulses were normal. Peripheral pulses were 2+. CHEST: Normal AP diameter and normal contour without any kyphoscoliosis. LYMPHATIC: No lymphadenopathy was appreciated in the neck, axillae or groin. SKIN:  Negative for scars, rashes, lesions, or ulcers on the right upper, right lower, left upper, left lower and trunk.    NEUROLOGICAL: Alert and oriented x 3.  Ambulation without assistive device. FWB. EXTREMITIES:  See musculoskeletal.  MUSCULOSKELETAL:  · Head and Neck: Right sided neck pain. Negative for misalignment, asymmetry, crepitation, defects, tenderness masses or effusions. · Left Upper Extremity: Inspection, percussion and palpation performed. Reals sign is negative. · Right Upper Extremity: Weakness, pain. Inspection, percussion and palpation performed. Reals sign is negative. · Spine, Ribs and Pelvis: Inspection, percussion and palpation performed. Negative for misalignment, asymmetry, crepitation, defects, tenderness masses or effusions. · Left Lower Extremity: Inspection, percussion and palpation performed. Negative straight leg raise. · Right Lower Extremity: Inspection, percussion and palpation performed. Negative straight leg raise.        SPINE EXAM:      Cervical spine: Neck is midline. Normal muscle tone. No focal atrophy is noted.     Lumbar spine: No rash, ecchymosis, or gross obliquity. No focal atrophy is noted.          Assessment and Plan    Due to the pt's persistent symptoms unrelieved by conservative measure Abraham Walsh is being admitted [facility] to undergo surgical intervention. The post-operative plan of care consists of physical therapy, home health and a 2 week f/u office visit. We are pending medical clearance by Dr Shannan Quiroz. The risks, benefits, complications and alternatives to surgery have been discussed in detail with the patient. The patient understands and agrees to proceed.      Bucky Arroyo NP-C dictating for Emily Licea MD

## 2019-12-04 ENCOUNTER — ANESTHESIA EVENT (OUTPATIENT)
Dept: SURGERY | Age: 64
DRG: 472 | End: 2019-12-04
Payer: COMMERCIAL

## 2019-12-05 ENCOUNTER — ANESTHESIA (OUTPATIENT)
Dept: SURGERY | Age: 64
DRG: 472 | End: 2019-12-05
Payer: COMMERCIAL

## 2019-12-05 ENCOUNTER — APPOINTMENT (OUTPATIENT)
Dept: GENERAL RADIOLOGY | Age: 64
DRG: 472 | End: 2019-12-05
Attending: ORTHOPAEDIC SURGERY
Payer: COMMERCIAL

## 2019-12-05 ENCOUNTER — HOSPITAL ENCOUNTER (INPATIENT)
Age: 64
LOS: 1 days | Discharge: HOME HEALTH | DRG: 472 | End: 2019-12-06
Attending: ORTHOPAEDIC SURGERY | Admitting: ORTHOPAEDIC SURGERY
Payer: COMMERCIAL

## 2019-12-05 DIAGNOSIS — Z98.1 S/P CERVICAL SPINAL FUSION: Primary | ICD-10-CM

## 2019-12-05 DIAGNOSIS — M47.22 CERVICAL SPONDYLOSIS WITH MYELOPATHY AND RADICULOPATHY: ICD-10-CM

## 2019-12-05 DIAGNOSIS — M47.12 CERVICAL SPONDYLOSIS WITH MYELOPATHY AND RADICULOPATHY: ICD-10-CM

## 2019-12-05 PROBLEM — G95.9 CERVICAL MYELOPATHY WITH CERVICAL RADICULOPATHY (HCC): Status: ACTIVE | Noted: 2019-12-05

## 2019-12-05 PROBLEM — M54.12 CERVICAL MYELOPATHY WITH CERVICAL RADICULOPATHY (HCC): Status: ACTIVE | Noted: 2019-12-05

## 2019-12-05 PROCEDURE — 0RT30ZZ RESECTION OF CERVICAL VERTEBRAL DISC, OPEN APPROACH: ICD-10-PCS | Performed by: ORTHOPAEDIC SURGERY

## 2019-12-05 PROCEDURE — 77030019908 HC STETH ESOPH SIMS -A: Performed by: ANESTHESIOLOGY

## 2019-12-05 PROCEDURE — 77030012893: Performed by: ORTHOPAEDIC SURGERY

## 2019-12-05 PROCEDURE — 77030031139 HC SUT VCRL2 J&J -A: Performed by: ORTHOPAEDIC SURGERY

## 2019-12-05 PROCEDURE — 74011000250 HC RX REV CODE- 250: Performed by: NURSE ANESTHETIST, CERTIFIED REGISTERED

## 2019-12-05 PROCEDURE — 77030012012 HC AIRWY OP SFT TELE -A: Performed by: ANESTHESIOLOGY

## 2019-12-05 PROCEDURE — 74011000250 HC RX REV CODE- 250

## 2019-12-05 PROCEDURE — 74011250636 HC RX REV CODE- 250/636: Performed by: NURSE ANESTHETIST, CERTIFIED REGISTERED

## 2019-12-05 PROCEDURE — 77030027960 HC SPCR CERV VIKOS K2M -G1: Performed by: ORTHOPAEDIC SURGERY

## 2019-12-05 PROCEDURE — 65270000029 HC RM PRIVATE

## 2019-12-05 PROCEDURE — 77030012406 HC DRN WND PENRS BARD -A: Performed by: ORTHOPAEDIC SURGERY

## 2019-12-05 PROCEDURE — 77030004402 HC BUR NEUR STRY -C: Performed by: ORTHOPAEDIC SURGERY

## 2019-12-05 PROCEDURE — 77030026438 HC STYL ET INTUB CARD -A: Performed by: ANESTHESIOLOGY

## 2019-12-05 PROCEDURE — 77030027138 HC INCENT SPIROMETER -A: Performed by: ORTHOPAEDIC SURGERY

## 2019-12-05 PROCEDURE — 77030040361 HC SLV COMPR DVT MDII -B: Performed by: ORTHOPAEDIC SURGERY

## 2019-12-05 PROCEDURE — C1713 ANCHOR/SCREW BN/BN,TIS/BN: HCPCS | Performed by: ORTHOPAEDIC SURGERY

## 2019-12-05 PROCEDURE — 74011250636 HC RX REV CODE- 250/636

## 2019-12-05 PROCEDURE — 77030010512 HC APPL CLP LIG J&J -C: Performed by: ORTHOPAEDIC SURGERY

## 2019-12-05 PROCEDURE — 01N10ZZ RELEASE CERVICAL NERVE, OPEN APPROACH: ICD-10-PCS | Performed by: ORTHOPAEDIC SURGERY

## 2019-12-05 PROCEDURE — 77030029099 HC BN WAX SSPC -A: Performed by: ORTHOPAEDIC SURGERY

## 2019-12-05 PROCEDURE — 76010000131 HC OR TIME 2 TO 2.5 HR: Performed by: ORTHOPAEDIC SURGERY

## 2019-12-05 PROCEDURE — 0RG20K0 FUSION OF 2 OR MORE CERVICAL VERTEBRAL JOINTS WITH NONAUTOLOGOUS TISSUE SUBSTITUTE, ANTERIOR APPROACH, ANTERIOR COLUMN, OPEN APPROACH: ICD-10-PCS | Performed by: ORTHOPAEDIC SURGERY

## 2019-12-05 PROCEDURE — 74011250637 HC RX REV CODE- 250/637: Performed by: NURSE ANESTHETIST, CERTIFIED REGISTERED

## 2019-12-05 PROCEDURE — 74011250636 HC RX REV CODE- 250/636: Performed by: ORTHOPAEDIC SURGERY

## 2019-12-05 PROCEDURE — 77030013567 HC DRN WND RESERV BARD -A: Performed by: ORTHOPAEDIC SURGERY

## 2019-12-05 PROCEDURE — 76210000000 HC OR PH I REC 2 TO 2.5 HR: Performed by: ORTHOPAEDIC SURGERY

## 2019-12-05 PROCEDURE — 77030018390 HC SPNG HEMSTAT2 J&J -B: Performed by: ORTHOPAEDIC SURGERY

## 2019-12-05 PROCEDURE — 77030039266 HC ADH SKN EXOFIN S2SG -A: Performed by: ORTHOPAEDIC SURGERY

## 2019-12-05 PROCEDURE — 77030034475 HC MISC IMPL SPN: Performed by: ORTHOPAEDIC SURGERY

## 2019-12-05 PROCEDURE — 74011000258 HC RX REV CODE- 258: Performed by: NURSE ANESTHETIST, CERTIFIED REGISTERED

## 2019-12-05 PROCEDURE — 74011000272 HC RX REV CODE- 272: Performed by: ORTHOPAEDIC SURGERY

## 2019-12-05 PROCEDURE — 97161 PT EVAL LOW COMPLEX 20 MIN: CPT

## 2019-12-05 PROCEDURE — L0120 CERV FLEX N/ADJ FOAM PRE OTS: HCPCS | Performed by: ORTHOPAEDIC SURGERY

## 2019-12-05 PROCEDURE — 74011250637 HC RX REV CODE- 250/637: Performed by: ORTHOPAEDIC SURGERY

## 2019-12-05 PROCEDURE — 74011000250 HC RX REV CODE- 250: Performed by: ORTHOPAEDIC SURGERY

## 2019-12-05 PROCEDURE — 74011250636 HC RX REV CODE- 250/636: Performed by: ANESTHESIOLOGY

## 2019-12-05 PROCEDURE — 77030011267 HC ELECTRD BLD COVD -A: Performed by: ORTHOPAEDIC SURGERY

## 2019-12-05 PROCEDURE — 77030040922 HC BLNKT HYPOTHRM STRY -A: Performed by: ORTHOPAEDIC SURGERY

## 2019-12-05 PROCEDURE — 76060000035 HC ANESTHESIA 2 TO 2.5 HR: Performed by: ORTHOPAEDIC SURGERY

## 2019-12-05 PROCEDURE — 74011250636 HC RX REV CODE- 250/636: Performed by: NURSE PRACTITIONER

## 2019-12-05 DEVICE — Z DUP USE 2717610 GRAFT SPNL W14XH8XL11MM CERV LORD W PLUG VIKOS: Type: IMPLANTABLE DEVICE | Site: SPINE CERVICAL | Status: FUNCTIONAL

## 2019-12-05 DEVICE — SCREW SPNL L16MM DIA4MM SELF STARTING VAR ANT CERV TI OZARK: Type: IMPLANTABLE DEVICE | Site: SPINE CERVICAL | Status: FUNCTIONAL

## 2019-12-05 RX ORDER — HYDROMORPHONE HYDROCHLORIDE 2 MG/ML
INJECTION, SOLUTION INTRAMUSCULAR; INTRAVENOUS; SUBCUTANEOUS
Status: COMPLETED
Start: 2019-12-05 | End: 2019-12-05

## 2019-12-05 RX ORDER — SODIUM CHLORIDE, SODIUM LACTATE, POTASSIUM CHLORIDE, CALCIUM CHLORIDE 600; 310; 30; 20 MG/100ML; MG/100ML; MG/100ML; MG/100ML
50 INJECTION, SOLUTION INTRAVENOUS CONTINUOUS
Status: DISCONTINUED | OUTPATIENT
Start: 2019-12-05 | End: 2019-12-05 | Stop reason: HOSPADM

## 2019-12-05 RX ORDER — SODIUM CHLORIDE 0.9 % (FLUSH) 0.9 %
5-40 SYRINGE (ML) INJECTION AS NEEDED
Status: DISCONTINUED | OUTPATIENT
Start: 2019-12-05 | End: 2019-12-06 | Stop reason: HOSPADM

## 2019-12-05 RX ORDER — NALOXONE HYDROCHLORIDE 0.4 MG/ML
0.1 INJECTION, SOLUTION INTRAMUSCULAR; INTRAVENOUS; SUBCUTANEOUS AS NEEDED
Status: DISCONTINUED | OUTPATIENT
Start: 2019-12-05 | End: 2019-12-05 | Stop reason: HOSPADM

## 2019-12-05 RX ORDER — CEFAZOLIN SODIUM 2 G/50ML
2 SOLUTION INTRAVENOUS
Status: COMPLETED | OUTPATIENT
Start: 2019-12-05 | End: 2019-12-05

## 2019-12-05 RX ORDER — GLYCOPYRROLATE 0.2 MG/ML
INJECTION INTRAMUSCULAR; INTRAVENOUS AS NEEDED
Status: DISCONTINUED | OUTPATIENT
Start: 2019-12-05 | End: 2019-12-05 | Stop reason: HOSPADM

## 2019-12-05 RX ORDER — ACETAMINOPHEN 500 MG
1000 TABLET ORAL ONCE
Status: COMPLETED | OUTPATIENT
Start: 2019-12-05 | End: 2019-12-05

## 2019-12-05 RX ORDER — ACETAMINOPHEN 500 MG
1000 TABLET ORAL EVERY 6 HOURS
Status: DISCONTINUED | OUTPATIENT
Start: 2019-12-05 | End: 2019-12-06 | Stop reason: HOSPADM

## 2019-12-05 RX ORDER — OXYCODONE HYDROCHLORIDE 5 MG/1
5-10 TABLET ORAL
Status: DISCONTINUED | OUTPATIENT
Start: 2019-12-05 | End: 2019-12-06 | Stop reason: HOSPADM

## 2019-12-05 RX ORDER — NALOXONE HYDROCHLORIDE 0.4 MG/ML
0.4 INJECTION, SOLUTION INTRAMUSCULAR; INTRAVENOUS; SUBCUTANEOUS AS NEEDED
Status: DISCONTINUED | OUTPATIENT
Start: 2019-12-05 | End: 2019-12-06 | Stop reason: HOSPADM

## 2019-12-05 RX ORDER — FENTANYL CITRATE 50 UG/ML
50 INJECTION, SOLUTION INTRAMUSCULAR; INTRAVENOUS AS NEEDED
Status: COMPLETED | OUTPATIENT
Start: 2019-12-05 | End: 2019-12-05

## 2019-12-05 RX ORDER — SODIUM CHLORIDE 0.9 % (FLUSH) 0.9 %
5-40 SYRINGE (ML) INJECTION AS NEEDED
Status: DISCONTINUED | OUTPATIENT
Start: 2019-12-05 | End: 2019-12-05 | Stop reason: HOSPADM

## 2019-12-05 RX ORDER — HYDROMORPHONE HYDROCHLORIDE 2 MG/ML
0.5 INJECTION, SOLUTION INTRAMUSCULAR; INTRAVENOUS; SUBCUTANEOUS
Status: DISCONTINUED | OUTPATIENT
Start: 2019-12-05 | End: 2019-12-05 | Stop reason: HOSPADM

## 2019-12-05 RX ORDER — DEXAMETHASONE SODIUM PHOSPHATE 4 MG/ML
INJECTION, SOLUTION INTRA-ARTICULAR; INTRALESIONAL; INTRAMUSCULAR; INTRAVENOUS; SOFT TISSUE AS NEEDED
Status: DISCONTINUED | OUTPATIENT
Start: 2019-12-05 | End: 2019-12-05 | Stop reason: HOSPADM

## 2019-12-05 RX ORDER — MIDAZOLAM HYDROCHLORIDE 1 MG/ML
INJECTION, SOLUTION INTRAMUSCULAR; INTRAVENOUS
Status: COMPLETED
Start: 2019-12-05 | End: 2019-12-05

## 2019-12-05 RX ORDER — KETAMINE HYDROCHLORIDE 50 MG/ML
INJECTION, SOLUTION INTRAMUSCULAR; INTRAVENOUS AS NEEDED
Status: DISCONTINUED | OUTPATIENT
Start: 2019-12-05 | End: 2019-12-05 | Stop reason: HOSPADM

## 2019-12-05 RX ORDER — DIPHENHYDRAMINE HCL 25 MG
25 CAPSULE ORAL
Status: DISCONTINUED | OUTPATIENT
Start: 2019-12-05 | End: 2019-12-06 | Stop reason: HOSPADM

## 2019-12-05 RX ORDER — HYDROMORPHONE HYDROCHLORIDE 2 MG/ML
INJECTION, SOLUTION INTRAMUSCULAR; INTRAVENOUS; SUBCUTANEOUS
Status: DISPENSED
Start: 2019-12-05 | End: 2019-12-06

## 2019-12-05 RX ORDER — CELECOXIB 400 MG/1
400 CAPSULE ORAL ONCE
Status: COMPLETED | OUTPATIENT
Start: 2019-12-05 | End: 2019-12-05

## 2019-12-05 RX ORDER — VANCOMYCIN HYDROCHLORIDE 1 G/20ML
INJECTION, POWDER, LYOPHILIZED, FOR SOLUTION INTRAVENOUS AS NEEDED
Status: DISCONTINUED | OUTPATIENT
Start: 2019-12-05 | End: 2019-12-05 | Stop reason: HOSPADM

## 2019-12-05 RX ORDER — PREGABALIN 75 MG/1
75 CAPSULE ORAL ONCE
Status: COMPLETED | OUTPATIENT
Start: 2019-12-05 | End: 2019-12-05

## 2019-12-05 RX ORDER — MAGNESIUM SULFATE 100 %
4 CRYSTALS MISCELLANEOUS AS NEEDED
Status: DISCONTINUED | OUTPATIENT
Start: 2019-12-05 | End: 2019-12-05 | Stop reason: HOSPADM

## 2019-12-05 RX ORDER — LOSARTAN POTASSIUM AND HYDROCHLOROTHIAZIDE 25; 100 MG/1; MG/1
1 TABLET ORAL DAILY
Status: DISCONTINUED | OUTPATIENT
Start: 2019-12-06 | End: 2019-12-06 | Stop reason: HOSPADM

## 2019-12-05 RX ORDER — SODIUM CHLORIDE 0.9 % (FLUSH) 0.9 %
5-40 SYRINGE (ML) INJECTION EVERY 8 HOURS
Status: DISCONTINUED | OUTPATIENT
Start: 2019-12-05 | End: 2019-12-06 | Stop reason: HOSPADM

## 2019-12-05 RX ORDER — MIDAZOLAM HYDROCHLORIDE 1 MG/ML
INJECTION, SOLUTION INTRAMUSCULAR; INTRAVENOUS AS NEEDED
Status: DISCONTINUED | OUTPATIENT
Start: 2019-12-05 | End: 2019-12-05 | Stop reason: HOSPADM

## 2019-12-05 RX ORDER — LORAZEPAM 2 MG/ML
1 INJECTION INTRAMUSCULAR
Status: DISCONTINUED | OUTPATIENT
Start: 2019-12-05 | End: 2019-12-06 | Stop reason: HOSPADM

## 2019-12-05 RX ORDER — DIAZEPAM 5 MG/1
5 TABLET ORAL
Status: DISCONTINUED | OUTPATIENT
Start: 2019-12-05 | End: 2019-12-06 | Stop reason: HOSPADM

## 2019-12-05 RX ORDER — HYDROMORPHONE HYDROCHLORIDE 1 MG/ML
1 INJECTION, SOLUTION INTRAMUSCULAR; INTRAVENOUS; SUBCUTANEOUS
Status: DISCONTINUED | OUTPATIENT
Start: 2019-12-05 | End: 2019-12-06 | Stop reason: HOSPADM

## 2019-12-05 RX ORDER — SODIUM CHLORIDE 0.9 % (FLUSH) 0.9 %
5-40 SYRINGE (ML) INJECTION EVERY 8 HOURS
Status: DISCONTINUED | OUTPATIENT
Start: 2019-12-05 | End: 2019-12-05 | Stop reason: HOSPADM

## 2019-12-05 RX ORDER — HYDROMORPHONE HYDROCHLORIDE 2 MG/ML
0.5 INJECTION, SOLUTION INTRAMUSCULAR; INTRAVENOUS; SUBCUTANEOUS
Status: COMPLETED | OUTPATIENT
Start: 2019-12-05 | End: 2019-12-05

## 2019-12-05 RX ORDER — DEXTROSE, SODIUM CHLORIDE, AND POTASSIUM CHLORIDE 5; .45; .15 G/100ML; G/100ML; G/100ML
25 INJECTION INTRAVENOUS CONTINUOUS
Status: DISCONTINUED | OUTPATIENT
Start: 2019-12-05 | End: 2019-12-06 | Stop reason: HOSPADM

## 2019-12-05 RX ORDER — DIPHENHYDRAMINE HYDROCHLORIDE 50 MG/ML
12.5 INJECTION, SOLUTION INTRAMUSCULAR; INTRAVENOUS
Status: DISCONTINUED | OUTPATIENT
Start: 2019-12-05 | End: 2019-12-05 | Stop reason: HOSPADM

## 2019-12-05 RX ORDER — MIDAZOLAM HYDROCHLORIDE 1 MG/ML
1 INJECTION, SOLUTION INTRAMUSCULAR; INTRAVENOUS
Status: COMPLETED | OUTPATIENT
Start: 2019-12-05 | End: 2019-12-05

## 2019-12-05 RX ORDER — ROCURONIUM BROMIDE 10 MG/ML
INJECTION, SOLUTION INTRAVENOUS AS NEEDED
Status: DISCONTINUED | OUTPATIENT
Start: 2019-12-05 | End: 2019-12-05 | Stop reason: HOSPADM

## 2019-12-05 RX ORDER — CEFAZOLIN SODIUM 2 G/50ML
2 SOLUTION INTRAVENOUS EVERY 8 HOURS
Status: DISCONTINUED | OUTPATIENT
Start: 2019-12-05 | End: 2019-12-06 | Stop reason: HOSPADM

## 2019-12-05 RX ORDER — DIPHENHYDRAMINE HYDROCHLORIDE 50 MG/ML
12.5 INJECTION, SOLUTION INTRAMUSCULAR; INTRAVENOUS
Status: DISCONTINUED | OUTPATIENT
Start: 2019-12-05 | End: 2019-12-06 | Stop reason: HOSPADM

## 2019-12-05 RX ORDER — LIDOCAINE HYDROCHLORIDE 10 MG/ML
0.1 INJECTION, SOLUTION EPIDURAL; INFILTRATION; INTRACAUDAL; PERINEURAL AS NEEDED
Status: DISCONTINUED | OUTPATIENT
Start: 2019-12-05 | End: 2019-12-05 | Stop reason: HOSPADM

## 2019-12-05 RX ORDER — METHYLENE BLUE 10 MG/ML
INJECTION INTRAVENOUS AS NEEDED
Status: DISCONTINUED | OUTPATIENT
Start: 2019-12-05 | End: 2019-12-05 | Stop reason: HOSPADM

## 2019-12-05 RX ORDER — FAMOTIDINE 20 MG/1
20 TABLET, FILM COATED ORAL ONCE
Status: COMPLETED | OUTPATIENT
Start: 2019-12-05 | End: 2019-12-05

## 2019-12-05 RX ORDER — ONDANSETRON 2 MG/ML
4 INJECTION INTRAMUSCULAR; INTRAVENOUS
Status: DISCONTINUED | OUTPATIENT
Start: 2019-12-05 | End: 2019-12-06 | Stop reason: HOSPADM

## 2019-12-05 RX ORDER — LIDOCAINE HYDROCHLORIDE 20 MG/ML
INJECTION, SOLUTION EPIDURAL; INFILTRATION; INTRACAUDAL; PERINEURAL AS NEEDED
Status: DISCONTINUED | OUTPATIENT
Start: 2019-12-05 | End: 2019-12-05 | Stop reason: HOSPADM

## 2019-12-05 RX ORDER — ONDANSETRON 2 MG/ML
INJECTION INTRAMUSCULAR; INTRAVENOUS AS NEEDED
Status: DISCONTINUED | OUTPATIENT
Start: 2019-12-05 | End: 2019-12-05 | Stop reason: HOSPADM

## 2019-12-05 RX ORDER — NEOSTIGMINE METHYLSULFATE 1 MG/ML
INJECTION, SOLUTION INTRAVENOUS AS NEEDED
Status: DISCONTINUED | OUTPATIENT
Start: 2019-12-05 | End: 2019-12-05 | Stop reason: HOSPADM

## 2019-12-05 RX ORDER — INSULIN LISPRO 100 [IU]/ML
INJECTION, SOLUTION INTRAVENOUS; SUBCUTANEOUS ONCE
Status: DISCONTINUED | OUTPATIENT
Start: 2019-12-05 | End: 2019-12-05 | Stop reason: HOSPADM

## 2019-12-05 RX ORDER — DEXTROSE 50 % IN WATER (D50W) INTRAVENOUS SYRINGE
25-50 AS NEEDED
Status: DISCONTINUED | OUTPATIENT
Start: 2019-12-05 | End: 2019-12-05 | Stop reason: HOSPADM

## 2019-12-05 RX ORDER — AMLODIPINE BESYLATE 2.5 MG/1
2.5 TABLET ORAL DAILY
Status: DISCONTINUED | OUTPATIENT
Start: 2019-12-06 | End: 2019-12-06 | Stop reason: HOSPADM

## 2019-12-05 RX ORDER — LABETALOL HCL 20 MG/4 ML
SYRINGE (ML) INTRAVENOUS AS NEEDED
Status: DISCONTINUED | OUTPATIENT
Start: 2019-12-05 | End: 2019-12-05 | Stop reason: HOSPADM

## 2019-12-05 RX ORDER — METOPROLOL TARTRATE 5 MG/5ML
INJECTION INTRAVENOUS AS NEEDED
Status: DISCONTINUED | OUTPATIENT
Start: 2019-12-05 | End: 2019-12-05 | Stop reason: HOSPADM

## 2019-12-05 RX ORDER — FENTANYL CITRATE 50 UG/ML
INJECTION, SOLUTION INTRAMUSCULAR; INTRAVENOUS AS NEEDED
Status: DISCONTINUED | OUTPATIENT
Start: 2019-12-05 | End: 2019-12-05 | Stop reason: HOSPADM

## 2019-12-05 RX ORDER — PROPOFOL 10 MG/ML
INJECTION, EMULSION INTRAVENOUS AS NEEDED
Status: DISCONTINUED | OUTPATIENT
Start: 2019-12-05 | End: 2019-12-05 | Stop reason: HOSPADM

## 2019-12-05 RX ORDER — ATORVASTATIN CALCIUM 10 MG/1
10 TABLET, FILM COATED ORAL DAILY
Status: DISCONTINUED | OUTPATIENT
Start: 2019-12-06 | End: 2019-12-06 | Stop reason: HOSPADM

## 2019-12-05 RX ADMIN — HYDROMORPHONE HYDROCHLORIDE 0.5 MG: 2 INJECTION, SOLUTION INTRAMUSCULAR; INTRAVENOUS; SUBCUTANEOUS at 13:40

## 2019-12-05 RX ADMIN — METOPROLOL TARTRATE 2 MG: 5 INJECTION, SOLUTION INTRAVENOUS at 12:06

## 2019-12-05 RX ADMIN — PREGABALIN 75 MG: 75 CAPSULE ORAL at 10:19

## 2019-12-05 RX ADMIN — ACETAMINOPHEN 1000 MG: 500 TABLET ORAL at 16:50

## 2019-12-05 RX ADMIN — Medication 10 ML: at 22:37

## 2019-12-05 RX ADMIN — DEXAMETHASONE SODIUM PHOSPHATE 4 MG: 4 INJECTION, SOLUTION INTRAMUSCULAR; INTRAVENOUS at 11:35

## 2019-12-05 RX ADMIN — DEXAMETHASONE SODIUM PHOSPHATE 10 MG: 4 INJECTION, SOLUTION INTRAMUSCULAR; INTRAVENOUS at 11:57

## 2019-12-05 RX ADMIN — ONDANSETRON 4 MG: 2 INJECTION INTRAMUSCULAR; INTRAVENOUS at 12:52

## 2019-12-05 RX ADMIN — METOPROLOL TARTRATE 3 MG: 5 INJECTION, SOLUTION INTRAVENOUS at 12:00

## 2019-12-05 RX ADMIN — SODIUM CHLORIDE 12.5 MG: 900 INJECTION, SOLUTION INTRAVENOUS at 14:24

## 2019-12-05 RX ADMIN — KETAMINE HYDROCHLORIDE 50 MG: 50 INJECTION INTRAMUSCULAR; INTRAVENOUS at 11:27

## 2019-12-05 RX ADMIN — Medication 5 MG: at 12:59

## 2019-12-05 RX ADMIN — POTASSIUM CHLORIDE, DEXTROSE MONOHYDRATE AND SODIUM CHLORIDE 25 ML/HR: 150; 5; 450 INJECTION, SOLUTION INTRAVENOUS at 16:47

## 2019-12-05 RX ADMIN — ROCURONIUM BROMIDE 20 MG: 10 INJECTION, SOLUTION INTRAVENOUS at 12:42

## 2019-12-05 RX ADMIN — FENTANYL CITRATE 50 MCG: 50 INJECTION INTRAMUSCULAR; INTRAVENOUS at 11:40

## 2019-12-05 RX ADMIN — ROCURONIUM BROMIDE 50 MG: 10 INJECTION, SOLUTION INTRAVENOUS at 11:15

## 2019-12-05 RX ADMIN — HYDROMORPHONE HYDROCHLORIDE 1 MG: 1 INJECTION, SOLUTION INTRAMUSCULAR; INTRAVENOUS; SUBCUTANEOUS at 20:33

## 2019-12-05 RX ADMIN — LIDOCAINE HYDROCHLORIDE 80 MG: 20 INJECTION, SOLUTION EPIDURAL; INFILTRATION; INTRACAUDAL; PERINEURAL at 11:13

## 2019-12-05 RX ADMIN — FENTANYL CITRATE 100 MCG: 50 INJECTION INTRAMUSCULAR; INTRAVENOUS at 11:13

## 2019-12-05 RX ADMIN — FENTANYL CITRATE 50 MCG: 50 INJECTION, SOLUTION INTRAMUSCULAR; INTRAVENOUS at 13:52

## 2019-12-05 RX ADMIN — LABETALOL 20 MG/4 ML (5 MG/ML) INTRAVENOUS SYRINGE 2.5 MG: at 13:00

## 2019-12-05 RX ADMIN — HYDROMORPHONE HYDROCHLORIDE 0.5 MG: 2 INJECTION, SOLUTION INTRAMUSCULAR; INTRAVENOUS; SUBCUTANEOUS at 13:20

## 2019-12-05 RX ADMIN — MIDAZOLAM 1 MG: 1 INJECTION INTRAMUSCULAR; INTRAVENOUS at 14:11

## 2019-12-05 RX ADMIN — PROPOFOL 160 MG: 10 INJECTION, EMULSION INTRAVENOUS at 11:14

## 2019-12-05 RX ADMIN — HYDROMORPHONE HYDROCHLORIDE 0.5 MG: 2 INJECTION, SOLUTION INTRAMUSCULAR; INTRAVENOUS; SUBCUTANEOUS at 15:07

## 2019-12-05 RX ADMIN — MIDAZOLAM HYDROCHLORIDE 2 MG: 2 INJECTION, SOLUTION INTRAMUSCULAR; INTRAVENOUS at 11:06

## 2019-12-05 RX ADMIN — DIPHENHYDRAMINE HYDROCHLORIDE 12.5 MG: 50 INJECTION INTRAMUSCULAR; INTRAVENOUS at 14:00

## 2019-12-05 RX ADMIN — CEFAZOLIN 2 G: 10 INJECTION, POWDER, FOR SOLUTION INTRAVENOUS at 22:36

## 2019-12-05 RX ADMIN — PROPOFOL 40 MG: 10 INJECTION, EMULSION INTRAVENOUS at 12:42

## 2019-12-05 RX ADMIN — FENTANYL CITRATE 50 MCG: 50 INJECTION, SOLUTION INTRAMUSCULAR; INTRAVENOUS at 13:32

## 2019-12-05 RX ADMIN — CEFAZOLIN 2 G: 10 INJECTION, POWDER, FOR SOLUTION INTRAVENOUS at 11:20

## 2019-12-05 RX ADMIN — HYDROMORPHONE HYDROCHLORIDE 0.5 MG: 2 INJECTION, SOLUTION INTRAMUSCULAR; INTRAVENOUS; SUBCUTANEOUS at 13:50

## 2019-12-05 RX ADMIN — FAMOTIDINE 20 MG: 20 TABLET ORAL at 10:19

## 2019-12-05 RX ADMIN — SODIUM CHLORIDE, SODIUM LACTATE, POTASSIUM CHLORIDE, AND CALCIUM CHLORIDE: 600; 310; 30; 20 INJECTION, SOLUTION INTRAVENOUS at 11:06

## 2019-12-05 RX ADMIN — FENTANYL CITRATE 50 MCG: 50 INJECTION, SOLUTION INTRAMUSCULAR; INTRAVENOUS at 13:37

## 2019-12-05 RX ADMIN — OXYCODONE HYDROCHLORIDE 5 MG: 5 TABLET ORAL at 18:18

## 2019-12-05 RX ADMIN — CELECOXIB 400 MG: 400 CAPSULE ORAL at 10:19

## 2019-12-05 RX ADMIN — FENTANYL CITRATE 50 MCG: 50 INJECTION INTRAMUSCULAR; INTRAVENOUS at 11:57

## 2019-12-05 RX ADMIN — ACETAMINOPHEN 1000 MG: 500 TABLET ORAL at 10:19

## 2019-12-05 RX ADMIN — GLYCOPYRROLATE 0.1 MG: 0.2 INJECTION INTRAMUSCULAR; INTRAVENOUS at 11:27

## 2019-12-05 RX ADMIN — HYDROMORPHONE HYDROCHLORIDE 0.5 MG: 2 INJECTION, SOLUTION INTRAMUSCULAR; INTRAVENOUS; SUBCUTANEOUS at 14:42

## 2019-12-05 RX ADMIN — HYDROMORPHONE HYDROCHLORIDE 0.5 MG: 2 INJECTION, SOLUTION INTRAMUSCULAR; INTRAVENOUS; SUBCUTANEOUS at 14:57

## 2019-12-05 RX ADMIN — GLYCOPYRROLATE 0.6 MG: 0.2 INJECTION INTRAMUSCULAR; INTRAVENOUS at 12:59

## 2019-12-05 RX ADMIN — SODIUM CHLORIDE, SODIUM LACTATE, POTASSIUM CHLORIDE, AND CALCIUM CHLORIDE 50 ML/HR: 600; 310; 30; 20 INJECTION, SOLUTION INTRAVENOUS at 10:19

## 2019-12-05 RX ADMIN — MIDAZOLAM 1 MG: 1 INJECTION INTRAMUSCULAR; INTRAVENOUS at 14:00

## 2019-12-05 RX ADMIN — HYDROMORPHONE HYDROCHLORIDE 0.5 MG: 2 INJECTION, SOLUTION INTRAMUSCULAR; INTRAVENOUS; SUBCUTANEOUS at 13:30

## 2019-12-05 RX ADMIN — FENTANYL CITRATE 50 MCG: 50 INJECTION, SOLUTION INTRAMUSCULAR; INTRAVENOUS at 13:47

## 2019-12-05 RX ADMIN — CEFAZOLIN 2 G: 10 INJECTION, POWDER, FOR SOLUTION INTRAVENOUS at 16:51

## 2019-12-05 NOTE — ROUTINE PROCESS
End of Shift Note     Bedside and verbal shift change report given to [facility] (On coming nurse) by Espinoza Manuel (Off going nurse).   Report included the following information:      --Procedure Summary     --MAR,     --Recent Results     --Med Rec Status    SBAR Recommendations: monitor pain    Issues for Provider to address N/A          Activity This Shift     [] Bed Rest Order   [] Refused   [x] Dangled    [] TDWB         Ambulating:     [x] Bathroom     [] BSC     [] Room/Hallway      Up in Chair for meals    [x]Yes [] No   Voiding       [x] Yes  [] No  Perdomo          [] Yes  [] No  Incontinent [] Yes  [x] No    DUE TO VOID N/A POUR        [] Yes [] No  Purewick    [] Yes [] No  New Onset [] Yes [] No Straight Cath   []Yes  [] No  Condom Cath  [] Yes [] No  MD Called      [] Yes  [] No   Blood Sugars Managed []Yes [] No    Bowels Moved [] Yes [] No    Incontinent     [] Yes [x] No Passed Gas []Yes [] No    New Onset  []Yes [] No        MD Called []Yes  [] No     CHG Bath Done     Before Surgery     After Surgery      [] Yes  [] No  [] Yes  [x] No       Drain Removed [] Yes  [x] No [] N/A    Dressing Changed [] Yes   [x] No [] N/A      Nausea/Vomiting [] Yes   [x] No     Ice Packs Changed [] Yes   [x] No  [] N/A    Incentive Spirometer  [x] Yes  [] No      SCD Pumps On     Ankle Pumping  [x] Yes   [] No      [] Yes   [] No        Telemetry Monitoring [] Yes   [x] No   Rhythm NSR

## 2019-12-05 NOTE — ANESTHESIA PREPROCEDURE EVALUATION
Relevant Problems   No relevant active problems       Anesthetic History   No history of anesthetic complications            Review of Systems / Medical History  Patient summary reviewed, nursing notes reviewed and pertinent labs reviewed    Pulmonary  Within defined limits                 Neuro/Psych   Within defined limits           Cardiovascular    Hypertension          Hyperlipidemia    Exercise tolerance: >4 METS     GI/Hepatic/Renal           Liver disease     Endo/Other        Arthritis     Other Findings   Comments: Chronic pain    lbp           Physical Exam    Airway  Mallampati: II    Neck ROM: normal range of motion   Mouth opening: Normal     Cardiovascular  Regular rate and rhythm,  S1 and S2 normal,  no murmur, click, rub, or gallop  Rhythm: regular  Rate: normal         Dental  No notable dental hx       Pulmonary  Breath sounds clear to auscultation               Abdominal  GI exam deferred       Other Findings            Anesthetic Plan    ASA: 3  Anesthesia type: general          Induction: Intravenous

## 2019-12-05 NOTE — PERIOP NOTES
TRANSFER - OUT REPORT:    Verbal report given to Soledad MARAVILLA(name) on [facility]  being transferred to [address] [place] [address](unit) for routine post - op       Report consisted of patients Situation, Background, Assessment and   Recommendations(SBAR). Information from the following report(s) SBAR, Kardex, OR Summary, Procedure Summary, Intake/Output, MAR, Recent Results and Med Rec Status was reviewed with the receiving nurse. Lines:   Peripheral IV 12/05/19 Left Forearm (Active)   Site Assessment Clean, dry, & intact 12/5/2019  1:18 PM   Phlebitis Assessment 0 12/5/2019  1:18 PM   Infiltration Assessment 0 12/5/2019  1:18 PM   Dressing Status Clean, dry, & intact 12/5/2019  1:18 PM   Dressing Type Tape;Transparent 12/5/2019  1:18 PM   Hub Color/Line Status Pink;Capped 12/5/2019  1:18 PM   Alcohol Cap Used No 12/5/2019 10:14 AM       Peripheral IV 12/05/19 Right Hand (Active)   Site Assessment Clean, dry, & intact 12/5/2019  1:18 PM   Phlebitis Assessment 0 12/5/2019  1:18 PM   Infiltration Assessment 0 12/5/2019  1:18 PM   Dressing Status Clean, dry, & intact 12/5/2019  1:18 PM   Dressing Type Tape;Transparent 12/5/2019  1:18 PM   Hub Color/Line Status Pink; Infusing 12/5/2019  1:18 PM   Alcohol Cap Used No 12/5/2019 10:15 AM        Opportunity for questions and clarification was provided.       Patient transported with:   O2 @ 2 liters  Registered Nurse

## 2019-12-05 NOTE — PROGRESS NOTES
OR today  VSS  Just arrived to floor  Swallowing ice chip without difficulty  States right arm pain much improved  Soft collar in place  ARNULFO = 10 cc  Has not voided yet.    Neuro intact    Kristen Cook NP

## 2019-12-05 NOTE — ROUTINE PROCESS
TRANSFER - IN REPORT:    Verbal report received from RENITA Quan(name) on Abraham Solorzano  being received from [address] [place] [address](unit) for routine progression of care      Report consisted of patients Situation, Background, Assessment and   Recommendations(SBAR). Information from the following report(s) SBAR, Kardex, Procedure Summary and MAR was reviewed with the receiving nurse. Opportunity for questions and clarification was provided. Assessment completed upon patients arrival to unit and care assumed.

## 2019-12-05 NOTE — INTERVAL H&P NOTE
H&P Update:  Abraham Kaye was seen and examined. History and physical has been reviewed. The patient has been examined.  There have been no significant clinical changes since the completion of the originally dated History and Physical.

## 2019-12-05 NOTE — BRIEF OP NOTE
BRIEF OPERATIVE NOTE    Date of Procedure: 12/5/2019   Preoperative Diagnosis: Cervical spondylosis with myelopathy [M47.12]  Postoperative Diagnosis: Cervical spondylosis with myelopathy [M47.12]    Procedure(s):  ANTERIOR CERVICAL DISCECTOMY WITH FUSION C3/4 C4/5 C5/6; C-ARM/K2M  Surgeon(s) and Role:     Anny Sanchez MD - Primary         Surgical Assistant: 0    Surgical Staff:  Circ-1: Joleen Chappell RN  Circ-2: Nelda Rivers RN  Radiology Technician: RT Leno  Scrub Tech-1: Justin Phillip  Surg Asst-1: Yimi Gonzales  Event Time In Time Out   Incision Start 1130    Incision Close       Anesthesia: General   Estimated Blood Loss: 25  Specimens: * No specimens in log *   Findings: stenosis   Complications: 0  Implants:   Implant Name Type Inv.  Item Serial No.  Lot No. LRB No. Used Action   SPACER BNE CERV [id number] -- VIKOS ALLOGRAFT W/PLUG - [id number]  SPACER BNE CERV [id number] -- VIKOS ALLOGRAFT W/PLUG [id number] [employer]  N/A 1 Implanted   SPACER BNE CERV [id number] -- VIKOS ALLOGRAFT W/PLUG - [id number]  SPACER BNE CERV [id number] -- VIKOS ALLOGRAFT W/PLUG [id number] [employer]  N/A 1 Implanted   SPACER BNE CERV [id number] -- VIKOS ALLOGRAFT W/PLUG - [id number]  SPACER BNE CERV [id number] -- VIKOS ALLOGRAFT W/PLUG [id number] [employer]  N/A 1 Implanted   60mm OZARK PLATE    [facility] [employer]  N/A 1 Implanted   SCR CERV TANVI SELF STRT 4X16MM -- OZARK - [id number]  SCR CERV TANVI SELF STRT 4X16MM -- OZARK  [employer]  N/A 8 Implanted

## 2019-12-05 NOTE — PROGRESS NOTES
Problem: Mobility Impaired (Adult and Pediatric)  Goal: *Acute Goals and Plan of Care (Insert Text)  Description  Physical Therapy Goals  Initiated 12/5/2019 and to be accomplished within 7 day(s)  2. Patient will transfer from bed to chair and chair to bed with modified independence using the least restrictive device. 3.  Patient will perform sit to stand with modified independence. 4.  Patient will ambulate with modified independence for 200 feet with the least restrictive device. 5.  Patient will ascend/descend 4 stairs with unilateral handrail(s) with modified independence. PLOF: Pt reports living in 2 story house with upstairs master bedroom with wife. 3 JONAS with handrails. Pt reporting being independent with all mobility without AD, working full time as supervisor and driving . Outcome: Progressing Towards Goal     PHYSICAL THERAPY EVALUATION    Patient: Indu Diana (71 y.o. male)  Date: 12/5/2019  Primary Diagnosis: Cervical spondylosis with myelopathy [M47.12]  Cervical spondylosis with myelopathy and radiculopathy [M47.12, M47.22]  Cervical myelopathy with cervical radiculopathy [M47.12]  Procedure(s) (LRB):  ANTERIOR CERVICAL DISCECTOMY WITH FUSION C3/4 C4/5 C5/6; C-ARM/K2M (N/A) Day of Surgery   Precautions: cervical fusion,        PLOF: see above     ASSESSMENT :  Based on the objective data described below, the patient presents with decreased balance reactions and decreased independence in functional mobility s/p cervical fusion. Pt cleared to participate by RN and agreeable to PT session. Wife at bedside. Discussed precautions, pt unable to verbalize precautions but reported understanding after educated by PT. Pt completed log roll to R with Cole and verbal cues. Sitting on EOB with appropriate balance reactions while donning gown. Sit to stand with CGA, able to complete donning of underwear.  Ambulating to bedside recliner with handhold assist. Pt left with all needs met and call bell in reach with wife present. RN notified of current assistance levels. Patient will benefit from skilled intervention to address the above impairments. Patient's rehabilitation potential is considered to be Good  Factors which may influence rehabilitation potential include:   [x]         None noted  []         Mental ability/status  []         Medical condition  []         Home/family situation and support systems  []         Safety awareness  []         Pain tolerance/management  []         Other:      PLAN :  Recommendations and Planned Interventions:   [x]           Bed Mobility Training             []    Neuromuscular Re-Education  [x]           Transfer Training                   []    Orthotic/Prosthetic Training  [x]           Gait Training                          []    Modalities  [x]           Therapeutic Exercises           []    Edema Management/Control  [x]           Therapeutic Activities            [x]    Family Training/Education  [x]           Patient Education  []           Other (comment):    Frequency/Duration: Patient will be followed by physical therapy 1-2 times per day/4-7 days per week to address goals. Discharge Recommendations: Outpatient  Further Equipment Recommendations for Discharge: TBD     SUBJECTIVE:   Patient stated I just want to be careful so everything can heal right.     OBJECTIVE DATA SUMMARY:     Past Medical History:   Diagnosis Date    Arthritis     Chronic back pain     Dr. Plummer Neftaly    Dyslipidemia     Erectile dysfunction     Fatty liver     US 2002, serologies negative; fib-4 1.06 from 3/14    FHx: colon cancer     H/O bone scan 3/14    negative outside of djd    HTN (hypertension)     Hypogonadism male     negative pituitary MRI 2013    Internal hemorrhoids 11/15    Dr Sarabjit Mondragon urinary tract symptoms (LUTS) 5/16    mild    Lumbago     MRI 1/14 and CT 3/14 w lumbar spinal stenosis severe    Lumbar post-laminectomy syndrome     Lumbar spinal stenosis Overweight (BMI 25.0-29. 9)     IF 12/18 start weight 194 lbs    Prediabetes     2 hr gtt 124 from 10/11    Rosacea     S/P lumbar spinal fusion     Spasm of muscle     Status post lumbar laminectomy     Thoracic or lumbosacral neuritis or radiculitis, unspecified      Past Surgical History:   Procedure Laterality Date    CHEST SURGERY PROCEDURE UNLISTED      resection of a RUL lung lesion for recurring polyps 1980s    HX CATARACT REMOVAL  09/2014    Dr Maryanne Armenta right     HX COLONOSCOPY      negative Dr. Brian Sigala 2005, 2010; Dr Crystal Del Valle 11/15 hemorrhoids    [facility]      Dr Jair José 10/17, L 2/18    HX LIPOMA RESECTION  2012    Dr. Dilan Pleitez right buttock    HX LUMBAR FUSION  2014    L3/4 for cauda equina, Dr. Savannah Lane     Barriers to Learning/Limitations: None  Compensate with: N/A  Home Situation:  Home Situation  Home Environment: Independent living  # Steps to Enter: 3  One/Two Story Residence: Two story  # of Interior Steps: 26  Living Alone: No  Support Systems: Child(josé luis), Spouse/Significant Other/Partner, Family member(s)  Patient Expects to be Discharged to[de-identified] Private residence  Current DME Used/Available at Home: [facility] Behavior:                 Skin Condition/Temp: Dry;Warm     Skin Integrity: Incision (comment)(anterior neck)  Skin Integumentary  Skin Color: Appropriate for ethnicity  Skin Condition/Temp: Dry;Warm  Skin Integrity: Incision (comment)(anterior neck)  Turgor: Non-tenting  Hair Growth: Present  Varicosities: Absent     Strength:    Strength:  Within functional limits(BLEs)                    Tone & Sensation:   Tone: Normal              Sensation: Intact               Range Of Motion:  AROM: Within functional limits(BLEs)                       Posture:  Posture (WDL): Within defined limits(soft collar donned )     Functional Mobility:  Bed Mobility:  Rolling: Modified independent(semi-reclined use of bedrails, log roll)  Supine to Sit: Modified independent(bed rails and log roll with verbal cues)     Scooting: Modified independent  Transfers:  Sit to Stand: Stand-by assistance  Stand to Sit: Stand-by assistance        Bed to Chair: Contact guard assistance              Balance:   Sitting: Intact; Without support  Standing: Impaired; Without support  Standing - Static: Good  Standing - Dynamic : Fair  Wheelchair Mobility:        Ambulation/Gait Training:  Distance (ft): 20 Feet (ft)  Assistive Device: (HHA)  Ambulation - Level of Assistance: Contact guard assistance(1 handhold )     Gait Description (WDL): Exceptions to WDL  Gait Abnormalities: Decreased step clearance        Base of Support: Widened     Speed/Kenzie: Slow  Step Length: Right shortened;Left shortened                 Stairs:             Pain:  Pain level pre-treatment: 2/10   Pain level post-treatment: 2/10       Activity Tolerance:   Good activity tolerance willing to continue to ambulate if needed. No reports of pain or fatigue  Please refer to the flowsheet for vital signs taken during this treatment. After treatment:   [x]         Patient left in no apparent distress sitting up in chair  []         Patient left in no apparent distress in bed  [x]         Call bell left within reach  [x]         Nursing notified  [x]         Caregiver present  []         Bed alarm activated  []         SCDs applied    COMMUNICATION/EDUCATION:   [x]         Role of Physical Therapy in the acute care setting. [x]         Fall prevention education was provided and the patient/caregiver indicated understanding. [x]         Patient/family have participated as able in goal setting and plan of care. [x]         Patient/family agree to work toward stated goals and plan of care. []         Patient understands intent and goals of therapy, but is neutral about his/her participation. []         Patient is unable to participate in goal setting/plan of care: ongoing with therapy staff.  []         Other:     Thank you for this referral.  Emmy Nesbitt JOSE Harmon, PT   Time Calculation: 15 mins      Eval Complexity: History: MEDIUM  Complexity : 1-2 comorbidities / personal factors will impact the outcome/ POC Exam:MEDIUM Complexity : 3 Standardized tests and measures addressing body structure, function, activity limitation and / or participation in recreation  Presentation: LOW Complexity : Stable, uncomplicated  Clinical Decision Making:Low Complexity low  Overall Complexity:LOW

## 2019-12-05 NOTE — ANESTHESIA POSTPROCEDURE EVALUATION
Procedure(s):  ANTERIOR CERVICAL DISCECTOMY WITH FUSION C3/4 C4/5 C5/6; C-ARM/K2M. general    Anesthesia Post Evaluation      Multimodal analgesia: multimodal analgesia used between 6 hours prior to anesthesia start to PACU discharge  Patient location during evaluation: bedside  Patient participation: complete - patient participated  Level of consciousness: awake  Pain management: adequate  Airway patency: patent  Anesthetic complications: no  Cardiovascular status: stable  Respiratory status: acceptable  Hydration status: acceptable  Post anesthesia nausea and vomiting:  controlled      Vitals Value Taken Time   /93 12/5/2019  1:26 PM   Temp 37 °C (98.6 °F) 12/5/2019  1:18 PM   Pulse 79 12/5/2019  1:34 PM   Resp 26 12/5/2019  1:34 PM   SpO2 99 % 12/5/2019  1:34 PM   Vitals shown include unvalidated device data.

## 2019-12-06 ENCOUNTER — HOME HEALTH ADMISSION (OUTPATIENT)
Dept: HOME HEALTH SERVICES | Facility: HOME HEALTH | Age: 64
End: 2019-12-06
Payer: COMMERCIAL

## 2019-12-06 VITALS
SYSTOLIC BLOOD PRESSURE: 141 MMHG | BODY MASS INDEX: 23.52 KG/M2 | RESPIRATION RATE: 16 BRPM | HEIGHT: 71 IN | WEIGHT: 168 LBS | DIASTOLIC BLOOD PRESSURE: 75 MMHG | OXYGEN SATURATION: 95 % | HEART RATE: 78 BPM | TEMPERATURE: 98.3 F

## 2019-12-06 PROCEDURE — 97116 GAIT TRAINING THERAPY: CPT

## 2019-12-06 PROCEDURE — 97165 OT EVAL LOW COMPLEX 30 MIN: CPT

## 2019-12-06 PROCEDURE — 74011250636 HC RX REV CODE- 250/636: Performed by: ORTHOPAEDIC SURGERY

## 2019-12-06 PROCEDURE — 74011250637 HC RX REV CODE- 250/637: Performed by: ORTHOPAEDIC SURGERY

## 2019-12-06 RX ORDER — NALOXONE HYDROCHLORIDE 4 MG/.1ML
SPRAY NASAL
Qty: 2 EACH | Refills: 0 | Status: SHIPPED | OUTPATIENT
Start: 2019-12-06 | End: 2020-01-22 | Stop reason: SDUPTHER

## 2019-12-06 RX ORDER — HYDROCODONE BITARTRATE AND ACETAMINOPHEN 10; 325 MG/1; MG/1
1 TABLET ORAL
Qty: 30 TAB | Refills: 0 | Status: SHIPPED | OUTPATIENT
Start: 2019-12-06 | End: 2022-07-24

## 2019-12-06 RX ORDER — TOPIRAMATE 25 MG/1
25-50 TABLET ORAL 2 TIMES DAILY
Qty: 120 TAB | Refills: 1 | Status: SHIPPED | OUTPATIENT
Start: 2019-12-06 | End: 2020-01-22

## 2019-12-06 RX ORDER — OXYCODONE AND ACETAMINOPHEN 5; 325 MG/1; MG/1
1 TABLET ORAL
Qty: 20 TAB | Refills: 0 | Status: SHIPPED | OUTPATIENT
Start: 2019-12-06 | End: 2019-12-06

## 2019-12-06 RX ADMIN — OXYCODONE HYDROCHLORIDE 10 MG: 5 TABLET ORAL at 09:50

## 2019-12-06 RX ADMIN — ACETAMINOPHEN 1000 MG: 500 TABLET ORAL at 01:07

## 2019-12-06 RX ADMIN — CEFAZOLIN 2 G: 10 INJECTION, POWDER, FOR SOLUTION INTRAVENOUS at 14:29

## 2019-12-06 RX ADMIN — HYDROMORPHONE HYDROCHLORIDE 1 MG: 1 INJECTION, SOLUTION INTRAMUSCULAR; INTRAVENOUS; SUBCUTANEOUS at 11:03

## 2019-12-06 RX ADMIN — ACETAMINOPHEN 1000 MG: 500 TABLET ORAL at 07:11

## 2019-12-06 RX ADMIN — Medication 10 ML: at 14:00

## 2019-12-06 RX ADMIN — AMLODIPINE BESYLATE 2.5 MG: 2.5 TABLET ORAL at 09:46

## 2019-12-06 RX ADMIN — HYDROMORPHONE HYDROCHLORIDE 1 MG: 1 INJECTION, SOLUTION INTRAMUSCULAR; INTRAVENOUS; SUBCUTANEOUS at 15:41

## 2019-12-06 RX ADMIN — LOSARTAN POTASSIUM AND HYDROCHLOROTHIAZIDE 1 TABLET: 100; 25 TABLET, FILM COATED ORAL at 09:45

## 2019-12-06 RX ADMIN — ACETAMINOPHEN 1000 MG: 500 TABLET ORAL at 11:57

## 2019-12-06 RX ADMIN — ATORVASTATIN CALCIUM 10 MG: 10 TABLET, FILM COATED ORAL at 09:46

## 2019-12-06 RX ADMIN — Medication 10 ML: at 07:13

## 2019-12-06 RX ADMIN — CEFAZOLIN 2 G: 10 INJECTION, POWDER, FOR SOLUTION INTRAVENOUS at 07:11

## 2019-12-06 NOTE — PROGRESS NOTES
Discharge planning    Discharge order noted for today. Pt has been accepted to [facility] agency. Met with patient and agreeable to the transition plan today. Transport has been arranged with spouse. Patient's discharge summary and home health  orders have been forwarded to [facility] home health  agency via epic. Connor Manrique Updated bedside RN, Cleo Beltre,  to the transition plan.   Discharge information has been documented on the AVS.       *KIRK CansecoN, RN  Pager # [phone]  Care Manager

## 2019-12-06 NOTE — DISCHARGE SUMMARY
Discharge  Summary     Patient: Abraham Palomino MRN: [id number]  SSN: [id number]    YOB: 1955  Age: 71 y.o. Sex: male       Admit Date: 12/5/2019    Discharge Date: 12/6/2019      Admission Diagnoses: Cervical spondylosis with myelopathy [M47.12]  Cervical spondylosis with myelopathy and radiculopathy [M47.12, M47.22]  Cervical myelopathy with cervical radiculopathy [M47.12]    Discharge Diagnoses:   Problem List as of 12/6/2019 Date Reviewed: 11/13/2019          Codes Class Noted - Resolved    Cervical spondylosis with myelopathy and radiculopathy ICD-10-CM: M47.12, M47.22  ICD-9-CM: 721.1, 723.4  12/5/2019 - Present        Cervical myelopathy with cervical radiculopathy ICD-10-CM: M47.12  ICD-9-CM: 721.1  12/5/2019 - Present        Cervical spinal stenosis ICD-10-CM: M48.02  ICD-9-CM: 723.0  11/13/2019 - Present        Spondylosis of lumbar region without myelopathy or radiculopathy ICD-10-CM: M47.816  ICD-9-CM: 721.3  11/4/2015 - Present        Encounter for long-term (current) drug use ICD-10-CM: Z79.899  ICD-9-CM: V58.69  11/4/2015 - Present        Chronic pain syndrome ICD-10-CM: G89.4  ICD-9-CM: 338.4  8/11/2015 - Present        Primary hypertension ICD-10-CM: I10  ICD-9-CM: 401.9  5/7/2015 - Present    Overview Signed 7/9/2019  5:43 PM by Carol Castro MD     Component of white coat             Hypogonadism male ICD-10-CM: E29.1  ICD-9-CM: 257.2  11/10/2014 - Present        Erectile dysfunction ICD-10-CM: N52.9  ICD-9-CM: 607.84  11/10/2014 - Present        IFG (impaired fasting glucose) ICD-10-CM: R73.01  ICD-9-CM: 790.21  11/10/2014 - Present        S/P lumbar spinal fusion ICD-10-CM: Z98.1  ICD-9-CM: V45.4  10/30/2014 - Present        Dyslipidemia ICD-10-CM: E78.5  ICD-9-CM: 272.4  9/13/2014 - Present        RESOLVED: Overweight (BMI 25.0-29. 9) ICD-10-CM: E66.3  ICD-9-CM: 278.02  6/11/2018 - 7/9/2019        RESOLVED: Lumbar radiculitis ICD-10-CM: M54.16  ICD-9-CM: 724.4 4/19/2016 - 11/13/2019        RESOLVED: Hypogonadism male ICD-10-CM: E29.1  ICD-9-CM: 257.2  11/12/2013 - 5/6/2014        RESOLVED: Dyslipidemia ICD-10-CM: E78.5  ICD-9-CM: 272.4  11/21/2012 - 9/13/2014        RESOLVED: Erectile dysfunction ICD-10-CM: N52.9  ICD-9-CM: 607.84  7/6/2012 - 5/6/2014        RESOLVED: Fatty liver on Us 2002 ICD-10-CM: K76.0  ICD-9-CM: 571.8  12/20/2011 - 5/6/2014        RESOLVED: Prediabetes 2 hr  10/11 ICD-10-CM: R73.03  ICD-9-CM: 790.29  Unknown - 5/6/2014               Discharge Condition: Good    Procedure:Anterior cervical decompression and fusion C3-4, C4-5, C5-6, structural allograft x3, anterior cervical plate fixation C3, C4, C5, C6 with a K2M anterior cervical locking plate and screws. Hospital Course: Normal hospital course for this procedure. Tolerated surgical intervention well. VSS Throughout. Neuro intact . Incision dry and intact, tolerating PO intake, voiding adequately. Ambulatory with no assistive device . Disposition: home      Face to Face Encounter    Patients Name: Shaggy Wells  YOB: 1955    Primary Diagnosis: s/p cervical fusion    Date of Face to Face:   12/6/2019                                   Face to Face Encounter findings are related to primary reason for home care:   yes    1. I certify that the patient needs intermittent skilled nursing care, physical therapy and/or speech therapy. I will be following this patient in the Community and will be responsible for reviewing and signing the [address] 2. Initial Orders for Care: Must be completed only if Face to Face MD will not be signing the [address] Skilled Nursing and Physical Therapy    3. I certify that this patient is homebound for the following reason(s): Requires considerable and taxing effort to leave the home     4.  I certify that this patient is under my care and that I, or a nurse practitioner or [address] working with me, had a Face-to-Face Encounter that meets the physician Face-to-Face Encounter requirements. Document the physical findings from the Face-to-Face Encounter that support the need for skilled services: Has wound that requires skilled nursing assessment and treatment     Cortney Wheat NP  12/6/2019        Discharge Medications:   Current Discharge Medication List      START taking these medications    Details   topiramate (TOPAMAX) 25 mg tablet Take 1-2 Tabs by mouth two (2) times a day. Qty: 120 Tab, Refills: 1      !! naloxone (NARCAN) 4 mg/actuation nasal spray Use 1 spray intranasally into 1 nostril. Qty: 2 Each, Refills: 0       !! - Potential duplicate medications found. Please discuss with provider. CONTINUE these medications which have CHANGED    Details   HYDROcodone-acetaminophen (NORCO)  mg tablet Take 1 Tab by mouth every four to six (4-6) hours as needed for Pain for up to 14 days. Max Daily Amount: 6 Tabs. Qty: 30 Tab, Refills: 0    Associated Diagnoses: Cervical spondylosis with myelopathy and radiculopathy         CONTINUE these medications which have NOT CHANGED    Details   testosterone cypionate (DEPO-TESTOSTERONE) 100 mg/mL injection 100 mg by IntraMUSCular route every ten (10) days. Qty: 10 mL, Refills: 5    Associated Diagnoses: Hypogonadism in male      amLODIPine (NORVASC) 2.5 mg tablet Take 1 Tab by mouth daily. Qty: 30 Tab, Refills: 11    Associated Diagnoses: Essential hypertension      losartan-hydroCHLOROthiazide (HYZAAR) 100-25 mg per tablet take 1 tablet by mouth once daily  Qty: 90 Tab, Refills: 3    Associated Diagnoses: Essential hypertension      !!  Needle, Disp, 18 G (BD REGULAR BEVEL NEEDLES) 18 gauge x 1 1/2\" ndle USE TO DRAW UP TESTOSTERONE AND THEN CHANGE NEEDLE TO GIVE TESTOSTERONE  Qty: 25 Each, Refills: 1      atorvastatin (LIPITOR) 10 mg tablet take 1 tablet by mouth once daily  Qty: 90 Tab, Refills: 3      Syringe, Disposable, 3 mL syrg Pt to use with Testosterone injections. Qty: 25 Syringe, Refills: 1      !! BD REGULAR BEVEL NEEDLES 18 gauge x 1 1/2\" ndle USE TO DRAW UP TESTOSTERONE AND THEN CHANGE NEEDLE TO GIVE TESTOSTERONE  Qty: 25 Each, Refills: 1      cyclobenzaprine (FLEXERIL) 10 mg tablet take 1 tablet by mouth three times a day if needed  Refills: 0      vardenafil (LEVITRA) 20 mg tablet Take 20 mg by mouth as needed (ED). Qty: 30 Tab, Refills: 11    Associated Diagnoses: Erectile dysfunction, unspecified erectile dysfunction type      !! naloxone 4 mg/actuation spry 4 mg by Nasal route once as needed (opioid overdose) for up to 1 dose. May substitute 2 mg syringe if insurance requires  Qty: 1 Package, Refills: 0       !! - Potential duplicate medications found. Please discuss with provider. Follow-up Appointments   Procedures    FOLLOW UP VISIT Appointment in: Two Weeks     Standing Status:   Standing     Number of Occurrences:   1     Order Specific Question:   Appointment in     Answer:    Two Weeks       Signed By: Dory Horowitz NP     December 6, 2019

## 2019-12-06 NOTE — PROGRESS NOTES
POD # 1  ACDF C3-6    VSS, LS clear, Apical pulse regular  Dressing clean, dry and intact  ARNULFO:10cc  UA: voiding without difficulty  PT:ambulatory  Neuro intact had neck and RUE shoulder pain before surgery w/ some weakness. Denies arm pain, has good strength throughout. Moving all extremities. Plan: DC ARNULFO and DC home, pt normally takes Norco 10/325mg 5x day from PM, he doesn't do well with Percocet, he would like to temporarily increase his Norco. He also would like something for nerve pain, he has failed Gabapentin in the past. [facility] and cancelled Rx for Percocet increased Norco dose sent instead, Topamax sent to pharmacy, pt has Flexeril at home that he can use intermittently.     Emir Medeiros NP

## 2019-12-06 NOTE — PROGRESS NOTES
OCCUPATIONAL THERAPY EVALUATION/DISCHARGE    Patient: Amber Galeana (71 y.o. male)  Date: 12/6/2019  Primary Diagnosis: Cervical spondylosis with myelopathy [M47.12]  Cervical spondylosis with myelopathy and radiculopathy [M47.12, M47.22]  Cervical myelopathy with cervical radiculopathy [M47.12]  Procedure(s) (LRB):  ANTERIOR CERVICAL DISCECTOMY WITH FUSION C3/4 C4/5 C5/6; C-ARM/K2M (N/A) 1 Day Post-Op   Precautions:   Spinal(cervical; soft collar)  PLOF: Pt was independent with basic self care tasks and functional mobility PTA. ASSESSMENT AND RECOMMENDATIONS:  Based on the objective data described below, the patient is able to perform basic self care tasks without assistance while seated and in standing. Functional transfers completed with modified independence. Cervical spine precautions reviewed and patient verbalized understanding. Patient has a supportive spouse at home to assist him prn and all needed DME (shower seat) for bathroom safety. Skilled occupational therapy is not indicated at this time. Discharge Recommendations: None  Further Equipment Recommendations for Discharge: N/A      SUBJECTIVE:   Patient stated I'm not ready to get my clothes on yet.     OBJECTIVE DATA SUMMARY:     Past Medical History:   Diagnosis Date    Arthritis     Chronic back pain     Dr. Suzan Atkins    Dyslipidemia     Erectile dysfunction     Fatty liver     US 2002, serologies negative; fib-4 1.06 from 3/14    FHx: colon cancer     H/O bone scan 3/14    negative outside of djd    HTN (hypertension)     Hypogonadism male     negative pituitary MRI 2013    Internal hemorrhoids 11/15    Dr Andra Caldera urinary tract symptoms (LUTS) 5/16    mild    Lumbago     MRI 1/14 and CT 3/14 w lumbar spinal stenosis severe    Lumbar post-laminectomy syndrome     Lumbar spinal stenosis     Overweight (BMI 25.0-29. 9)     IF 12/18 start weight 194 lbs    Prediabetes     2 hr gtt 124 from 10/11    Rosacea     S/P lumbar spinal fusion Spasm of muscle     Status post lumbar laminectomy     Thoracic or lumbosacral neuritis or radiculitis, unspecified      Past Surgical History:   Procedure Laterality Date    CHEST SURGERY PROCEDURE UNLISTED      resection of a RUL lung lesion for recurring polyps 1980s    HX CATARACT REMOVAL  09/2014    Dr Herson Lloyd right     HX COLONOSCOPY      negative Dr. Chanda West 2005, 2010; Dr Juani Moise 11/15 hemorrhoids    Jackie Bright      Dr Jim Cuellar 10/17, L 2/18    HX LIPOMA RESECTION  2012    Dr. Oscar Jackson right buttock    HX LUMBAR FUSION  2014    L3/4 for cauda equina, Dr. Linda Valerio     Barriers to Learning/Limitations: None  Compensate with: visual, verbal, tactile, kinesthetic cues/model    Home Situation:   Home Situation  Home Environment: Independent living  # Steps to Enter: 3  One/Two Story Residence: Two story  # of Interior Steps: 26  Living Alone: No  Support Systems: Child(josé luis), Spouse/Significant Other/Partner, Family member(s)  Patient Expects to be Discharged to[de-identified] Private residence  Current DME Used/Available at Home: Jarold Bristle or Shower Type: Shower(with seat)  [x]     Right hand dominant   []     Left hand dominant    Cognitive/Behavioral Status:  Neurologic State: Alert  Orientation Level: Oriented X4  Cognition: Appropriate decision making; Follows commands  Safety/Judgement: Awareness of environment; Fall prevention    Skin: Intact on UEs  Edema: None noted in UEs    Vision/Perceptual:    Acuity: Within Defined Limits      Coordination: BUE  Fine Motor Skills-Upper: Left Intact; Right Intact    Gross Motor Skills-Upper: Left Intact; Right Intact    Balance:  Sitting: Intact  Standing: Intact    Strength: BUE  Strength:  Within functional limits(within spinal precautions)    Tone & Sensation: BUE  Tone: Normal  Sensation: Intact    Range of Motion: BUE  AROM: Within functional limits    Functional Mobility and Transfers for ADLs:  Bed Mobility:  Supine to Sit: Modified independent(via log roll )  Sit to Supine: Modified independent  Scooting: Modified independent  Transfers:  Sit to Stand: Modified independent  Stand to Sit: Modified independent   Toilet Transfer : Modified independent    ADL Assessment:  Feeding: Independent    Oral Facial Hygiene/Grooming: Independent    Bathing: Modified independent    Upper Body Dressing: Independent    Lower Body Dressing: Modified independent    Toileting: Modified independent    Pain:  Pain level pre-treatment: 0/10   Pain level post-treatment: 0/10   Pain Intervention(s): Medication (see MAR); Rest  Response to intervention: Nurse notified, See doc flow    Activity Tolerance:   Good  Please refer to the flowsheet for vital signs taken during this treatment. After treatment:   []  Patient left in no apparent distress sitting up in chair  [x]  Patient left in no apparent distress in bed  [x]  Call bell left within reach  [x]  Nursing notified  []  Caregiver present  []  Bed alarm activated    COMMUNICATION/EDUCATION:   [x]      Role of Occupational Therapy in the acute care setting  [x]      Home safety education was provided and the patient/caregiver indicated understanding. [x]      Patient/family have participated as able and agree with findings and recommendations. []      Patient is unable to participate in plan of care at this time. Thank you for this referral.  Usman Coker MS OTR/L   Time Calculation: 14 mins      Eval Complexity: History: LOW Complexity : Brief history review ; Examination: LOW Complexity : 1-3 performance deficits relating to physical, cognitive , or psychosocial skils that result in activity limitations and / or participation restrictions ;    Decision Making:LOW Complexity : No comorbidities that affect functional and no verbal or physical assistance needed to complete eval tasks

## 2019-12-06 NOTE — PROGRESS NOTES
conducted an initial consultation and Spiritual Assessment for Joanna Giang, who is a 71 y. o.,male. Patient's Primary Language is: [place]. According to the patient's EMR Oriental orthodox Affiliation is: [facility].     The reason the Patient came to the hospital is:   Patient Active Problem List    Diagnosis Date Noted    Cervical spondylosis with myelopathy and radiculopathy 12/05/2019    Cervical myelopathy with cervical radiculopathy 12/05/2019    Cervical spinal stenosis 11/13/2019    Spondylosis of lumbar region without myelopathy or radiculopathy 11/04/2015    Encounter for long-term (current) drug use 11/04/2015    Chronic pain syndrome 08/11/2015    Primary hypertension 05/07/2015    Hypogonadism male 11/10/2014    Erectile dysfunction 11/10/2014    IFG (impaired fasting glucose) 11/10/2014    S/P lumbar spinal fusion 10/30/2014    Dyslipidemia 09/13/2014        The  provided the following Interventions:  Initiated a relationship of care and support. Explored issues of milagros, belief, spirituality and Jew/ritual needs while hospitalized. Listened empathically. Provided chaplaincy education. Provided information about Spiritual Care Services. Offered prayer and assurance of continued prayers on patient's behalf. Chart reviewed. The following outcomes where achieved:  Patient shared limited information about both their medical narrative and spiritual journey/beliefs.  confirmed Patient's Oriental orthodox Affiliation. Patient processed feeling about current hospitalization. Patient expressed gratitude for 's visit. Assessment:  Patient does not have any Jew/cultural needs that will affect patient's preferences in health care. There are no spiritual or Jew issues which require intervention at this time. Plan:  Chaplains will continue to follow and will provide pastoral care on an as needed/requested basis.    recommends bedside caregivers page  on duty if patient shows signs of acute spiritual or emotional distress.     [id number] [address]   [phone]

## 2019-12-06 NOTE — ROUTINE PROCESS
Bedside and verbal shift change report given to Farhana Laughlin RN (oncoming nurse) by Linda Mg RN (offgoing nurse).   Report included the following information:  -SBAR  -Kardex

## 2019-12-06 NOTE — OP NOTES
[address] [place] [address]   OPERATIVE REPORT    Name:  Henry Stephens  MR#:   [id number]  :  1955  ACCOUNT #:  [de-identified]  DATE OF SERVICE:  2019    PREOPERATIVE DIAGNOSIS:  Cervical spondylitic myelopathy with radiculopathy. POSTOPERATIVE DIAGNOSIS:  Cervical spondylitic myelopathy with radiculopathy. PROCEDURE PERFORMED:  Anterior cervical decompression and fusion C3-4, C4-5, C5-6, structural allograft x3, anterior cervical plate fixation C3, C4, C5, C6 with a K2M anterior cervical locking plate and screws. SURGEON:  Joel Duran MD.    ASSISTANT:  None. ANESTHESIA:  General endotracheal.    COMPLICATIONS:  None. SPECIMENS REMOVED:  None. IMPLANTS:  K2M anterior cervical locking plate and screws, structural allograft x3. ESTIMATED BLOOD LOSS:  25 mL mainly from the osteophytectomy on the anterior aspect of the cervical spine. FINDINGS:  The patient had a spondylitic stenosis more severe at 4-5 and 5-6 with disk protrusion and uncinate spurring. At C3-4, there was a central disk herniation. OPERATION:  Following induction of general endotracheal anesthesia, the patient was placed with the head in neutral position in Pina headrest.  The patient was prepped and draped in usual fashion. Right-sided approach was utilized. Sternocleidomastoid and great vessels were mobilized laterally. The esophagus and trachea were mobilized medially with blunt finger dissection. Prevertebral fascia was entered, and C-arm image verified the surgical level. [place] pins were placed in the bodies of 5 and 6. Cloward self-retaining retractor blades were placed under the cover of longus colli musculature bilaterally. Annular tissue was incised, and a radical diskectomy done back to the posterior margin.   A high speed bur was used to bur out the posterior marginal osteophytes and then the osteophytes and posterior longitudinal ligament were resected with a 4.0 Veronica curette and Sella punch.  Thorough decompression was done of the epidural space. Endplates were prepared, and number 8 structural allograft tamped firmly into place with excellent bony apposition. This same procedure was repeated at C4-5 and finally at C3-4. At C3-4, there was more of a central disk herniation causing cord compression and uncinate spurring. At the conclusion, an anterior cervical locking plate was fixed to the spine with 2 screws in C3, 2 in C4, 2 in C5 and 2 in C6 with the locking device engaged. The wound was irrigated. There was no evidence of any active bleeding. Deep drain utilized. Vancomycin powder instilled for infection prophylaxis. The platysma was closed and the skin closed in layers with a subcuticular suture and Dermabond. A sterile occlusive dressing placed upon the wound. All counts were correct.       MD SIMONA Yin/[email or web address]  D:  12/05/2019 14:01  T:  12/05/2019 23:05  JOB #:  [id number]

## 2019-12-06 NOTE — PROGRESS NOTES
Reason for Admission:  Cervical spondylosis with myelopathy [M47.12]  Cervical spondylosis with myelopathy and radiculopathy [M47.12, M47.22]  Cervical myelopathy with cervical radiculopathy [M47.12]                 RRAT Score:   8           Plan for utilizing home health: [address]                    Likelihood of Readmission:   LOW                         Transition of Care Plan:              Initial assessment completed with patient. Cognitive status of patient: oriented to time, place, person and situation. Face sheet information confirmed:  yes. The patient designates Nany Posada, spouse ([phone]) to participate in his discharge plan and to receive any needed information. This patient lives in a single family home with spouse. Patient was able to navigate steps as needed. Prior to hospitalization, patient was considered to be independent with ADLs/IADLS : yes . Patient has a current ACP document on file: no  The patient and spouse will be available to transport patient home upon discharge. The patient has no medical equipment available in the home. Patient is not currently active with home health. Patient has not stayed in a skilled nursing facility or rehab. This patient is on dialysis :no    The Plan for Transition of Care is related to the following treatment goals: Home Health    The Patient  was provided with a choice of provider and agrees   with the discharge plan. [x] Yes [] No    Freedom of choice list was provided with basic dialogue that supports the patient's individualized plan of care/goals, treatment preferences and shares the quality data associated with the providers. [x] Yes [] No       Freedom of choice signed: yes, for [facility] health and referral sent    Currently, the discharge plan is Home with [address] [place] [address] Jp Esparza.     The patient states that he can obtain his medications from the pharmacy, and take his medications as directed.     Patient's current insurance is [address] [place] [address] Management Interventions  PCP Verified by CM: Yes(per pt, saw pcp 2 weeks ago.)  Mode of Transport at Discharge: Self  Transition of Care Consult (CM Consult): Discharge Planning, [address]: Yes  MyChart Signup: No  Discharge Durable Medical Equipment: No  Physical Therapy Consult: Yes  Occupational Therapy Consult: Yes  Speech Therapy Consult: No  Current Support Network: Lives with Spouse  Confirm Follow Up Transport: Self  Plan discussed with Pt/Family/Caregiver: Yes  Discharge Location  Discharge Placement: Home with home health        VALERIY Agudelo, RN  Pager # [phone]  Care Manager

## 2019-12-06 NOTE — HOME CARE
Received HH referral, [address] will follow for SN,PT,[place] protocol; patient states he needs no DME and no DME recommendations noted from PT;  [address] will follow. JARETH DAMON.

## 2019-12-06 NOTE — PROGRESS NOTES
Problem: Mobility Impaired (Adult and Pediatric)  Goal: *Acute Goals and Plan of Care (Insert Text)  Description  Physical Therapy Goals  Initiated 12/5/2019 and to be accomplished within 7 day(s)  2. Patient will transfer from bed to chair and chair to bed with modified independence using the least restrictive device. 3.  Patient will perform sit to stand with modified independence. 4.  Patient will ambulate with modified independence for 200 feet with the least restrictive device. 5.  Patient will ascend/descend 4 stairs with unilateral handrail(s) with modified independence. PLOF: Pt reports living in 2 story house with upstairs master bedroom with wife. 3 JONAS with handrails. Pt reporting being independent with all mobility without AD, working full time as supervisor and driving . Outcome: Resolved/Met    PHYSICAL THERAPY TREATMENT AND DISCHARGE    Patient: Abraham Kong (71 y.o. male)  Date: 12/6/2019  Diagnosis: Cervical spondylosis with myelopathy [M47.12]  Cervical spondylosis with myelopathy and radiculopathy [M47.12, M47.22]  Cervical myelopathy with cervical radiculopathy [M47.12]   <principal problem not specified>  Procedure(s) (LRB):  ANTERIOR CERVICAL DISCECTOMY WITH FUSION C3/4 C4/5 C5/6; C-ARM/K2M (N/A) 1 Day Post-Op  Precautions:    PLOF: see above     ASSESSMENT:  Based on the objective data described below, the patient presents with baseline functional mobility s/p cervical fusion. Pt agreeable to PT session, reporting donned pants independently. Pt able to verbalize precautions and completed log roll without cuing. Completed all mobility with Cole with appropriate gait pattern, no AD, and no loss of balance. Completed training stairs with unilateral handrail and reciprocal pattern. Pt demonstrated safe mobility with soft collar donned.  Pt returned to bed with all needs met and call bell in reach       PLAN:  Maximum therapeutic gains met at current level of care and patient will be discharged from physical therapy at this time. Rationale for discharge:  [x]     Goals Achieved  []     [address]  []     Patient not participating in therapy  []     Other:  Discharge Recommendations:  Outpatient  Further Equipment Recommendations for Discharge:  N/A     SUBJECTIVE:   Patient stated I know you need to do this but I am fine except for my neck.     OBJECTIVE DATA SUMMARY:   Critical Behavior:              Functional Mobility Training:  Bed Mobility:     Supine to Sit: Modified independent(via log roll )  Sit to Supine: Modified independent  Scooting: Modified independent         Transfers:  Sit to Stand: Modified independent  Stand to Sit: Modified independent                             Balance:      Range Of Motion:                                   Posture:           Wheelchair Mobility:          Ambulation/Gait Training:  Distance (ft): 300 Feet (ft)  Assistive Device: (none)        Gait Description (WDL): Within defined limits                                         Stairs:  Number of Stairs Trained: 4  Stairs - Level of Assistance: Supervision  Rail Use: Left         Pain:  Pain level pre-treatment: 3/10   Pain level post-treatment: 3/10       Activity Tolerance:   Excellent activity tolerance, no reports of fatigue, appropriate gait speed with safety. Please refer to the flowsheet for vital signs taken during this treatment. After treatment:   [] Patient left in no apparent distress sitting up in chair  [x] Patient left in no apparent distress in bed  [x] Call bell left within reach  [] Nursing notified  [] Caregiver present  [] Bed alarm activated  [] SCDs applied      COMMUNICATION/EDUCATION:   [x]         Role of Physical Therapy in the acute care setting. [x]         Fall prevention education was provided and the patient/caregiver indicated understanding. [x]         Patient/family have participated as able and agree with findings and recommendations.   []         Patient is unable to participate in plan of care at this time.   []         Other:        Ashtyn Stewart, PT   Time Calculation: 8 mins

## 2019-12-06 NOTE — ROUTINE PROCESS
Bedside and Verbal shift change report given to Mark Oakley RN (oncoming nurse) by Farhan Jose RN (offgoing nurse). Report included the following information Kardex and MAR.     0700    End of Shift Note     Bedside and verbal shift change report given to Farhan Jose RN (On coming nurse) by Mark Oakley RN (Off going nurse).   Report included the following information:      --Procedure Summary     --MAR,     --Recent Results     --Med Rec Status    SBAR Recommendations: none    Issues for Provider to address none          Activity This Shift     [] Bed Rest Order   [] Refused   [] Dangled    [] TDWB         Ambulating:     [x] Bathroom     [] BSC     [] Room/Hallway      Up in Chair for meals    [x]Yes [] No   Voiding       [x] Yes  [] No  Perdomo          [] Yes  [x] No  Incontinent [] Yes  [x] No    DUE TO VOID done POUR        [] Yes [x] No  Purewick    [] Yes [x] No  New Onset [] Yes [x] No Straight Cath   []Yes  [x] No  Condom Cath  [] Yes [x] No  MD Called      [] Yes  [x] No   Blood Sugars Managed []Yes [x] No    Bowels Moved [] Yes [x] No    Incontinent     [] Yes [x] No Passed Gas [x]Yes [] No    New Onset  []Yes [x] No        MD Called []Yes  [x] No     CHG Bath Done     Before Surgery     After Surgery      [x] Yes  [] No  [x] Yes  [] No       Drain Removed [] Yes  [x] No [] N/A    Dressing Changed [] Yes   [x] No [] N/A      Nausea/Vomiting [] Yes   [x] No     Ice Packs Changed [x] Yes   [] No  [] N/A    Incentive Spirometer  [x] Yes  [] No      SCD Pumps On     Ankle Pumping  [x] Yes   [] No      [] Yes   [x] No        Telemetry Monitoring [] Yes   [x] No   Rhythm N/A

## 2019-12-06 NOTE — HOME CARE
Discharge order noted for today , [facility] will follow for SN,PT,Holden protocol. HH referral processed to central intake. JARETH DAMON.

## 2019-12-07 ENCOUNTER — HOME CARE VISIT (OUTPATIENT)
Dept: SCHEDULING | Facility: HOME HEALTH | Age: 64
End: 2019-12-07
Payer: COMMERCIAL

## 2019-12-07 VITALS
SYSTOLIC BLOOD PRESSURE: 120 MMHG | DIASTOLIC BLOOD PRESSURE: 70 MMHG | HEART RATE: 70 BPM | TEMPERATURE: 97.6 F | OXYGEN SATURATION: 97 % | RESPIRATION RATE: 16 BRPM

## 2019-12-07 PROCEDURE — 400013 HH SOC

## 2019-12-07 PROCEDURE — G0151 HHCP-SERV OF PT,EA 15 MIN: HCPCS

## 2019-12-09 ENCOUNTER — HOME CARE VISIT (OUTPATIENT)
Dept: SCHEDULING | Facility: HOME HEALTH | Age: 64
End: 2019-12-09
Payer: COMMERCIAL

## 2019-12-09 ENCOUNTER — HOME CARE VISIT (OUTPATIENT)
Dept: HOME HEALTH SERVICES | Facility: HOME HEALTH | Age: 64
End: 2019-12-09
Payer: COMMERCIAL

## 2019-12-09 VITALS
TEMPERATURE: 99.3 F | SYSTOLIC BLOOD PRESSURE: 139 MMHG | HEART RATE: 79 BPM | RESPIRATION RATE: 16 BRPM | OXYGEN SATURATION: 97 % | DIASTOLIC BLOOD PRESSURE: 90 MMHG

## 2019-12-09 PROCEDURE — G0157 HHC PT ASSISTANT EA 15: HCPCS

## 2019-12-09 PROCEDURE — G0299 HHS/HOSPICE OF RN EA 15 MIN: HCPCS

## 2019-12-10 ENCOUNTER — HOME CARE VISIT (OUTPATIENT)
Dept: SCHEDULING | Facility: HOME HEALTH | Age: 64
End: 2019-12-10
Payer: COMMERCIAL

## 2019-12-10 VITALS
SYSTOLIC BLOOD PRESSURE: 125 MMHG | OXYGEN SATURATION: 99 % | DIASTOLIC BLOOD PRESSURE: 90 MMHG | HEART RATE: 91 BPM | TEMPERATURE: 99.3 F

## 2019-12-10 PROCEDURE — G0151 HHCP-SERV OF PT,EA 15 MIN: HCPCS

## 2019-12-11 VITALS
RESPIRATION RATE: 18 BRPM | OXYGEN SATURATION: 98 % | TEMPERATURE: 98.4 F | DIASTOLIC BLOOD PRESSURE: 82 MMHG | SYSTOLIC BLOOD PRESSURE: 140 MMHG | HEART RATE: 88 BPM

## 2019-12-13 ENCOUNTER — HOME CARE VISIT (OUTPATIENT)
Dept: SCHEDULING | Facility: HOME HEALTH | Age: 64
End: 2019-12-13
Payer: COMMERCIAL

## 2019-12-13 ENCOUNTER — OFFICE VISIT (OUTPATIENT)
Dept: INTERNAL MEDICINE CLINIC | Age: 64
End: 2019-12-13

## 2019-12-13 ENCOUNTER — HOME CARE VISIT (OUTPATIENT)
Dept: HOME HEALTH SERVICES | Facility: HOME HEALTH | Age: 64
End: 2019-12-13
Payer: COMMERCIAL

## 2019-12-13 VITALS
RESPIRATION RATE: 14 BRPM | TEMPERATURE: 98 F | SYSTOLIC BLOOD PRESSURE: 112 MMHG | WEIGHT: 168 LBS | OXYGEN SATURATION: 97 % | DIASTOLIC BLOOD PRESSURE: 72 MMHG | BODY MASS INDEX: 23.52 KG/M2 | HEIGHT: 71 IN | HEART RATE: 77 BPM

## 2019-12-13 DIAGNOSIS — Z98.1 S/P CERVICAL SPINAL FUSION: Primary | ICD-10-CM

## 2019-12-13 PROCEDURE — G0299 HHS/HOSPICE OF RN EA 15 MIN: HCPCS

## 2019-12-13 NOTE — PROGRESS NOTES
Patient being seen today for transitional care management    Patient was admitted to [facility] from 12/5-6/19               Initial interactive contact was done by nurse navigator     I thoroughly reviewed the discharge summary, notes, consults, labs and imaging studies in the electronic record. Pertinent details are summarized below and the record has been updated to reflect recent events    He successfully underwent and c spine deconpression and fusion by Dr Prasad Schmidt. He's done well postop with significant improvement of sx. Had transient swallowing difficulty but that's resolved. He has f/u in the next week or 2 with spine    Past Medical History:   Diagnosis Date    Arthritis     Chronic back pain     Dr. Jesica Banks Dyslipidemia     Erectile dysfunction     Fatty liver     US 2002, serologies negative; fib-4 1.06 from 3/14    FHx: colon cancer     H/O bone scan 3/14    negative outside of djd    HTN (hypertension)     Hypogonadism male     negative pituitary MRI 2013    Internal hemorrhoids 11/15    Dr Carissa Chang    Lower urinary tract symptoms (LUTS) 5/16    mild    Lumbago     MRI 1/14 and CT 3/14 w lumbar spinal stenosis severe    Lumbar post-laminectomy syndrome     Lumbar spinal stenosis     Overweight (BMI 25.0-29. 9)     IF 12/18 start weight 194 lbs    Prediabetes     2 hr gtt 124 from 10/11    Rosacea     S/P lumbar spinal fusion     Spasm of muscle     Status post lumbar laminectomy     Thoracic or lumbosacral neuritis or radiculitis, unspecified      Past Surgical History:   Procedure Laterality Date    CHEST SURGERY PROCEDURE UNLISTED      resection of a RUL lung lesion for recurring polyps 1980s    HX CATARACT REMOVAL  09/2014    Dr Underwood Space right     HX CERVICAL FUSION  12/2019    Dr Prasad Schmidt; ant decomp and fusion C3-6, structural allograftx3, ant cerv plate fixation D6-5 w K2M ant cervical locking plate and screws     HX COLONOSCOPY      negative Dr. Keshav Dangelo 2005, 2010; Dr Michael Keys 11/15 hemorrhoids    HX CORNEAL TRANSPLANT      Dr Chris Ewing 10/17, L 2/18    HX LIPOMA RESECTION  2012    Dr. Olga Avina right buttock    HX LUMBAR FUSION  2014    L3/4 for cauda equina, Dr. Rancho Ogden     Current Outpatient Medications   Medication Sig    cyclobenzaprine (FLEXERIL) 10 mg tablet Take 10 mg by mouth three (3) times daily as needed for Muscle Spasm(s).  HYDROcodone-acetaminophen (NORCO)  mg tablet Take 1 Tab by mouth every four to six (4-6) hours as needed for Pain for up to 14 days. Max Daily Amount: 6 Tabs. (Patient taking differently: Take 1 Tab by mouth every four (4) hours. for pain)    topiramate (TOPAMAX) 25 mg tablet Take 1-2 Tabs by mouth two (2) times a day.  naloxone (NARCAN) 4 mg/actuation nasal spray Use 1 spray intranasally into 1 nostril.  testosterone cypionate (DEPO-TESTOSTERONE) 100 mg/mL injection 100 mg by IntraMUSCular route every ten (10) days.  amLODIPine (NORVASC) 2.5 mg tablet Take 1 Tab by mouth daily. (Patient taking differently: Take 1 Tab by mouth daily. one tablet)    losartan-hydroCHLOROthiazide (HYZAAR) 100-25 mg per tablet take 1 tablet by mouth once daily    Needle, Disp, 18 G (BD REGULAR BEVEL NEEDLES) 18 gauge x 1 1/2\" ndle USE TO DRAW UP TESTOSTERONE AND THEN CHANGE NEEDLE TO GIVE TESTOSTERONE    vardenafil (LEVITRA) 20 mg tablet Take 20 mg by mouth as needed (ED).  atorvastatin (LIPITOR) 10 mg tablet take 1 tablet by mouth once daily    Syringe, Disposable, 3 mL syrg Pt to use with Testosterone injections.  BD REGULAR BEVEL NEEDLES 18 gauge x 1 1/2\" ndle USE TO DRAW UP TESTOSTERONE AND THEN CHANGE NEEDLE TO GIVE TESTOSTERONE    naloxone 4 mg/actuation spry 4 mg by Nasal route once as needed (opioid overdose) for up to 1 dose. May substitute 2 mg syringe if insurance requires    HYDROcodone-acetaminophen (NORCO)  mg tablet Take 0.5-1 Tabs by mouth every four (4) hours as needed for Pain (chronic) for up to 30 days.  Indications: Pain No current facility-administered medications for this visit. Allergies   Allergen Reactions    Lisinopril Other (comments)     ED     Visit Vitals  /72   Pulse 77   Temp 98 °F (36.7 °C) (Oral)   Resp 14   Ht 5' 11\" (1.803 m)   Wt 168 lb (76.2 kg)   SpO2 97%   BMI 23.43 kg/m²   wound site looks to be healing well, no redness, fluctuance or tenderness. Lungs cta, heart showed rrr, abd soft and nt    Assessment and plan:  1. S/P c spine surgery. Doing well, f/u Dr Lauren Herzog group as scheduled      Above conditions discussed at length and patient vocalized understanding.   All questions answered to patient satisfaction

## 2019-12-13 NOTE — PROGRESS NOTES
Geno Stone presents today for   Chief Complaint   Patient presents with   [facility] Follow Up     Cervical spondylosis with myelopathy               Depression Screening:  3 most recent PHQ Screens 10/21/2019   Little interest or pleasure in doing things Not at all   Feeling down, depressed, irritable, or hopeless Not at all   Total Score PHQ 2 0       Learning Assessment:  Learning Assessment 11/13/2019   PRIMARY LEARNER Patient   HIGHEST LEVEL OF EDUCATION - PRIMARY LEARNER  SOME COLLEGE   BARRIERS PRIMARY LEARNER NONE   CO-LEARNER CAREGIVER No   PRIMARY LANGUAGE ENGLISH    NEED -   LEARNER PREFERENCE PRIMARY DEMONSTRATION   LEARNING SPECIAL TOPICS -   ANSWERED BY patient   RELATIONSHIP SELF   ASSESSMENT COMMENT -       Abuse Screening:  Abuse Screening Questionnaire 7/9/2019   Do you ever feel afraid of your partner? N   Are you in a relationship with someone who physically or mentally threatens you? N   Is it safe for you to go home? Y       Fall Risk  Fall Risk Assessment, last 12 mths 10/21/2019   Able to walk? Yes   Fall in past 12 months? No     Health Maintenance Due   Topic Date Due    Pneumococcal 65+ years (1 of 1 - PPSV23) 10/03/2019    GLAUCOMA SCREENING Q2Y  12/18/2019           Coordination of Care:  1. Have you been to the ER, urgent care clinic since your last visit? Hospitalized since your last visit? Yes 12/5/19 [facility]    2. Have you seen or consulted any other health care providers outside of the [address] [place] [address] since your last visit? Include any pap smears or colon screening.  no

## 2019-12-16 VITALS
OXYGEN SATURATION: 98 % | TEMPERATURE: 97.5 F | SYSTOLIC BLOOD PRESSURE: 120 MMHG | DIASTOLIC BLOOD PRESSURE: 70 MMHG | HEART RATE: 78 BPM | RESPIRATION RATE: 18 BRPM

## 2019-12-19 ENCOUNTER — OFFICE VISIT (OUTPATIENT)
Dept: ORTHOPEDIC SURGERY | Age: 64
End: 2019-12-19

## 2019-12-19 VITALS
TEMPERATURE: 98 F | HEIGHT: 71 IN | BODY MASS INDEX: 23.52 KG/M2 | OXYGEN SATURATION: 95 % | SYSTOLIC BLOOD PRESSURE: 162 MMHG | DIASTOLIC BLOOD PRESSURE: 88 MMHG | HEART RATE: 76 BPM | WEIGHT: 168 LBS

## 2019-12-19 DIAGNOSIS — G95.9 CERVICAL MYELOPATHY (HCC): ICD-10-CM

## 2019-12-19 DIAGNOSIS — Z98.1 S/P CERVICAL SPINAL FUSION: Primary | ICD-10-CM

## 2019-12-19 NOTE — PROGRESS NOTES
Lou Pinon presents today for   Chief Complaint   Patient presents with    Neck Pain     post surgical follow up from 12/05/19       Is someone accompanying this pt? no    Is the patient using any DME equipment during [address]? no    Depression Screening:  3 most recent PHQ Screens 10/21/2019   Little interest or pleasure in doing things Not at all   Feeling down, depressed, irritable, or hopeless Not at all   Total Score PHQ 2 0       Learning Assessment:  Learning Assessment 11/13/2019   PRIMARY LEARNER Patient   HIGHEST LEVEL OF EDUCATION - PRIMARY LEARNER  SOME COLLEGE   BARRIERS PRIMARY LEARNER NONE   CO-LEARNER CAREGIVER No   PRIMARY LANGUAGE ENGLISH    NEED -   LEARNER PREFERENCE PRIMARY DEMONSTRATION   LEARNING SPECIAL TOPICS -   ANSWERED BY patient   RELATIONSHIP SELF   ASSESSMENT COMMENT -       Abuse Screening:  Abuse Screening Questionnaire 7/9/2019   Do you ever feel afraid of your partner? N   Are you in a relationship with someone who physically or mentally threatens you? N   Is it safe for you to go home? Y       Fall Risk  Fall Risk Assessment, last 12 mths 10/21/2019   Able to walk? Yes   Fall in past 12 months? No       OPIOID RISK TOOL  No flowsheet data found. Coordination of Care:  1. Have you been to the ER, urgent care clinic since your last visit? no  Hospitalized since your last visit? no    2. Have you seen or consulted any other health care providers outside of the [address] [place] [address] since your last visit? no Include any pap smears or colon screening.  none    Last  Checked 12/19/19

## 2019-12-19 NOTE — PROGRESS NOTES
Chief complaint/History of Present Illness:  Chief Complaint   Patient presents with    Neck Pain     post surgical follow up from 12/05/19     KALYANI  Colon T Arlester Fleischer is a  71 y.o.  male      HISTORY OF PRESENT ILLNESS:  The patient comes in today two weeks status post his C3-4, C4-5, C5-6 ACDF. That was done December 5, 2019. He states he is doing better. His neck pain and his right arm and shoulder pain and weakness are getting better. Sometimes, he does get flares of pain, but overall, he feels like he is progressing. He does have some difficulty swallowing, but he feels like that is getting better also. It feels like there is a little ledge in there and sometimes thicker things like rice and peanut butter crackers can feel like it gets stuck, but he has not lost any weight. He is eating. He feels like it is getting better. He is taking Norco 10/325 mg two to three times a day. Hopefully, he will be able to get off of that. 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 He states he can do most anything he wants to there. He does not have to do any heavy lifting. He is a nonsmoker. He denies fever and bowel and bladder dysfunction. PHYSICAL EXAM:  Mr. Arlester Fleischer is a 57-year-old male. He is alert and oriented. He has a normal mood and affect. He has a full weightbearing, non-antalgic gait using no assistive device. He has 4/5 strength of the bilateral upper extremities, negative Reals, and normal tandem gait. His anterior cervical incision is healing nicely. The edge is well-approximated. There is no erythema, warmth, drainage, or signs of infection. ASSESSMENT/PLAN:  This is a patient two weeks out from his ACDF C3 to C6. Overall, he is doing well. He feels like he is progressing and getting better. I did offer him a Medrol Dosepak due to his swallowing difficulty.   He would like to wait and see if it gets better on its own, but he can call and get the Medrol Dosepak if he needs it. We will see him back in four weeks with Dr. Kelly Ramos. We went over wound care and activity level. Review of systems:    Past Medical History:   Diagnosis Date    Arthritis     Chronic back pain     Dr. Todd Sotomayor Dyslipidemia     Erectile dysfunction     Fatty liver     US 2002, serologies negative; fib-4 1.06 from 3/14    FHx: colon cancer     H/O bone scan 3/14    negative outside of djd    HTN (hypertension)     Hypogonadism male     negative pituitary MRI 2013    Internal hemorrhoids 11/15    Dr Loi Cordoba    Lower urinary tract symptoms (LUTS) 5/16    mild    Lumbago     MRI 1/14 and CT 3/14 w lumbar spinal stenosis severe    Lumbar post-laminectomy syndrome     Lumbar spinal stenosis     Overweight (BMI 25.0-29. 9)     IF 12/18 start weight 194 lbs    Prediabetes     2 hr gtt 124 from 10/11    Rosacea     S/P lumbar spinal fusion     Spasm of muscle     Status post lumbar laminectomy     Thoracic or lumbosacral neuritis or radiculitis, unspecified      Past Surgical History:   Procedure Laterality Date    CHEST SURGERY PROCEDURE UNLISTED      resection of a RUL lung lesion for recurring polyps 1980s    HX CATARACT REMOVAL  09/2014    Dr Brandi Cristina right     HX CERVICAL FUSION  12/2019    Dr Kelly Ramos; ant decomp and fusion C3-6, structural allograftx3, ant cerv plate fixation P9-1 w K2M ant cervical locking plate and screws     HX COLONOSCOPY      negative Dr. Luis Wilson 2005, 2010; Dr Zuniga Done 11/15 hemorrhoids    Reginald Pino 10/17, L 2/18    Darcie Wilson  2012    Dr. Lani Diop right buttock    HX LUMBAR FUSION  2014    L3/4 for cauda equina, Dr. Valdez Course History     Socioeconomic History    Marital status:      Spouse name: Not on file    Number of children: 4    Years of education: Not on file    Highest education level: Not on file   Occupational History    Occupation: sup [employer] works   Social Needs  Financial resource strain: Not on [employer] insecurity:     Worry: Not on file     Inability: Not on file   [email or web address] needs:     Medical: Not on file     Non-medical: Not on file   Tobacco Use    Smoking status: Former Smoker     Types: Cigarettes    Smokeless tobacco: Never Used   Substance and Sexual Activity    Alcohol use: Yes     Alcohol/week: 0.0 standard drinks     Comment: social    Drug use: No    Sexual activity: Not on file   Lifestyle    Physical activity:     Days per week: Not on file     Minutes per session: Not on file    Stress: Not on file   Relationships    Social connections:     Talks on phone: Not on file     Gets together: Not on file     Attends [employer] service: Not on file     Active member of club or organization: Not on file     Attends meetings of clubs or organizations: Not on file     Relationship status: Not on file    Intimate partner violence:     Fear of current or ex partner: Not on file     Emotionally abused: Not on file     Physically abused: Not on file     Forced sexual activity: Not on file   Other Topics Concern    Not on file   Social History Narrative    Not on file     Family History   Problem Relation Age of Onset    Colon Cancer Mother     Diabetes Mother     Cancer Father         H&N    Hypertension Father     Stroke Father        Physical Exam:  Visit Vitals  /88 (BP 1 Location: Left arm, BP Patient Position: Sitting) Comment: pt has not had bp meds yet and will take at home   Pulse 76   Temp 98 °F (36.7 °C) (Oral)   Ht 5' 11\" (1.803 m)   Wt 168 lb (76.2 kg)   SpO2 95% Comment: RA   BMI 23.43 kg/m²     Pain Scale: 3/10       has been . reviewed and is appropriate          Diagnoses and all orders for this visit:    1. S/P cervical spinal fusion    2. Cervical myelopathy ([facility] [address]            Follow-up and Dispositions    · Return in about 4 weeks (around 1/16/2020) for with Dr Cecy Mercer.              We have informed Abraham Smallwood to notify us for immediate appointment if he has any worsening neurogical symptoms or if an emergency situation presents, then call [phone]

## 2020-01-03 RX ORDER — LOSARTAN POTASSIUM 100 MG/1
100 TABLET ORAL DAILY
Qty: 90 TAB | Refills: 3 | Status: SHIPPED | OUTPATIENT
Start: 2020-01-03 | End: 2021-02-10

## 2020-01-03 RX ORDER — HYDROCHLOROTHIAZIDE 25 MG/1
25 TABLET ORAL DAILY
Qty: 90 TAB | Refills: 3 | Status: SHIPPED | OUTPATIENT
Start: 2020-01-03 | End: 2021-07-27

## 2020-01-03 NOTE — TELEPHONE ENCOUNTER
Losartan/HCTZ is on backorder. Pharmacy is requesting the meds be prescribed separately. New Rx's pended - Please sign if appropriate. Requested Prescriptions     Pending Prescriptions Disp Refills    losartan (COZAAR) 100 mg tablet 90 Tab 3     Sig: Take 1 Tab by mouth daily.  hydroCHLOROthiazide (HYDRODIURIL) 25 mg tablet 90 Tab 3     Sig: Take 1 Tab by mouth daily.

## 2020-01-06 DIAGNOSIS — E29.1 HYPOGONADISM IN MALE: ICD-10-CM

## 2020-01-07 RX ORDER — TESTOSTERONE CYPIONATE 100 MG/ML
INJECTION, SOLUTION INTRAMUSCULAR
Qty: 1 VIAL | Refills: 5 | Status: SHIPPED | OUTPATIENT
Start: 2020-01-07 | End: 2020-06-03 | Stop reason: SDUPTHER

## 2020-01-10 ENCOUNTER — HOSPITAL ENCOUNTER (OUTPATIENT)
Dept: LAB | Age: 65
Discharge: HOME | End: 2020-01-10
Payer: COMMERCIAL

## 2020-01-10 DIAGNOSIS — R73.01 IFG (IMPAIRED FASTING GLUCOSE): ICD-10-CM

## 2020-01-10 LAB
ANION GAP SERPL CALC-SCNC: 2 MMOL/L (ref 3–18)
BUN SERPL-MCNC: 17 MG/DL (ref 7–18)
BUN/CREAT SERPL: 18 (ref 12–20)
CALCIUM SERPL-MCNC: 9.1 MG/DL (ref 8.5–10.1)
CHLORIDE SERPL-SCNC: 109 MMOL/L (ref 100–111)
CO2 SERPL-SCNC: 32 MMOL/L (ref 21–32)
CREAT SERPL-MCNC: 0.95 MG/DL (ref 0.6–1.3)
GLUCOSE SERPL-MCNC: 93 MG/DL (ref 74–99)
POTASSIUM SERPL-SCNC: 4.8 MMOL/L (ref 3.5–5.5)
SODIUM SERPL-SCNC: 143 MMOL/L (ref 136–145)

## 2020-01-10 PROCEDURE — 80048 BASIC METABOLIC PNL TOTAL CA: CPT

## 2020-01-10 PROCEDURE — 36415 COLL VENOUS BLD VENIPUNCTURE: CPT

## 2020-01-19 NOTE — PROGRESS NOTES
71 y.o. WHITE OR  male who presents for med clearance    He is very happy with the outcomes of the c spine surgery, mobility is improved    He continues to see pain mgmt and the meds continue to allow him to function with adls. No cardiovascular complaints. bp has been in good range of late. Back to his normal activities    No GI or  complaints. Continues on testosterone replacement and no complaints there. No polyuria, polydipsia, nocturia, vision change, not checking sugars at this time. Weight has dropped quite a bit in the past year as he's been strict with the fasting with eating window from 9 to 5    Vitals 1/22/2020   Weight 171 lb     Vitals 7/9/2019 12/10/2018   Weight 171 lb 194 lb     LAST MEDICARE WELLNESS EXAM: never as not medicare b    IF 12/18    Past Medical History:   Diagnosis Date    Arthritis     Chronic back pain     Dr. Leonel Harmon Dyslipidemia     Erectile dysfunction     Fatty liver     US 2002, serologies negative; fib-4 1.06 from 3/14    FHx: colon cancer     H/O bone scan 3/14    negative outside of [facility]jd    HTN (hypertension)     Hypogonadism male     negative pituitary MRI 2013    Internal hemorrhoids 11/15    Dr Cardona Ee    Lower urinary tract symptoms (LUTS) 5/16    mild    Lumbar post-laminectomy syndrome     Lumbar spinal stenosis     MRI 1/14 and CT 3/14 w lumbar spinal stenosis severe    Overweight (BMI 25.0-29. 9)     IF 12/18 start weight 194 lbs    Prediabetes 2011    2 hr gtt 124 from 10/11    Rosacea     S/P lumbar spinal fusion     Thoracic or lumbosacral neuritis or radiculitis, unspecified      Past Surgical History:   Procedure Laterality Date    CHEST SURGERY PROCEDURE UNLISTED      resection of a RUL lung lesion for recurring polyps 1980s    HX CATARACT REMOVAL  09/2014    Dr Mustapha Jimenez right     HX CERVICAL FUSION  12/2019    Dr Austin Adamson; ant decomp and fusion C3-6, structural allograftx3, ant cerv plate fixation R3-0 w K2M ant cervical locking plate and screws     HX COLONOSCOPY      negative Dr. Augustin Bonilla 2005, 2010; Dr Dutton Ink 11/15 hemorrhoids   Colleen Mom      Dr Restrepo Motto 10/17, L 2/18    Laura Brasher  2012    Dr. Santy George right buttock    HX LUMBAR FUSION  2014    L3/4 for cauda equina, Dr. Arlyn Ugalde History     Socioeconomic History    Marital status:      Spouse name: Not on file    Number of children: 3    Years of education: Not on file    Highest education level: Not on file   Occupational History    Occupation: [employer]   Social Needs    Financial resource strain: Not on file    Food insecurity:     Worry: Not on file     Inability: Not on file   Accredible needs:     Medical: Not on file     Non-medical: Not on file   Tobacco Use    Smoking status: Former Smoker     Types: Cigarettes    Smokeless tobacco: Never Used   Substance and Sexual Activity    Alcohol use:  Yes     Alcohol/week: 0.0 standard drinks     Comment: social    Drug use: No    Sexual activity: Not on file   Lifestyle    Physical activity:     Days per week: Not on file     Minutes per session: Not on file    Stress: Not on file   Relationships    Social connections:     Talks on phone: Not on file     Gets together: Not on file     Attends Zoroastrian service: Not on file     Active member of club or organization: Not on file     Attends meetings of clubs or organizations: Not on file     Relationship status: Not on file    Intimate partner violence:     Fear of current or ex partner: Not on file     Emotionally abused: Not on file     Physically abused: Not on file     Forced sexual activity: Not on file   Other Topics Concern    Not on file   Social History Narrative    Not on file     Allergies   Allergen Reactions    Lisinopril Other (comments)     ED      Current Outpatient Medications   Medication Sig    testosterone cypionate (DEPO-TESTOSTERONE) 100 mg/mL injection use as directed for TESTOSTERONE INJECTIONS    losartan (COZAAR) 100 mg tablet Take 1 Tab by mouth daily.  hydroCHLOROthiazide (HYDRODIURIL) 25 mg tablet Take 1 Tab by mouth daily.  cyclobenzaprine (FLEXERIL) 10 mg tablet Take 10 mg by mouth three (3) times daily as needed for Muscle Spasm(s).  amLODIPine (NORVASC) 2.5 mg tablet Take 1 Tab by mouth daily. (Patient taking differently: Take 1 Tab by mouth daily. one tablet)    Needle, Disp, 18 G (BD REGULAR BEVEL NEEDLES) 18 gauge x 1 1/2\" ndle USE TO DRAW UP TESTOSTERONE AND THEN CHANGE NEEDLE TO GIVE TESTOSTERONE    vardenafil (LEVITRA) 20 mg tablet Take 20 mg by mouth as needed (ED).  atorvastatin (LIPITOR) 10 mg tablet take 1 tablet by mouth once daily    Syringe, Disposable, 3 mL syrg Pt to use with Testosterone injections.  BD REGULAR BEVEL NEEDLES 18 gauge x 1 1/2\" ndle USE TO DRAW UP TESTOSTERONE AND THEN CHANGE NEEDLE TO GIVE TESTOSTERONE    HYDROcodone-acetaminophen (NORCO)  mg tablet Take 1 Tab by mouth every four to six (4-6) hours as needed for Pain for up to 14 days. Max Daily Amount: 6 Tabs. (Patient taking differently: Take 1 Tab by mouth every four (4) hours. for pain)    naloxone 4 mg/actuation spry 4 mg by Nasal route once as needed (opioid overdose) for up to 1 dose. May substitute 2 mg syringe if insurance requires    HYDROcodone-acetaminophen (NORCO)  mg tablet Take 0.5-1 Tabs by mouth every four (4) hours as needed for Pain (chronic) for up to 30 days. Indications: Pain     No current facility-administered medications for this visit.       REVIEW OF SYSTEMS: colo 11/15 Dr Pascale Marquez, sees Dr Eric Mclaughlin  Ophtho - no vision change or eye pain  Oral - no mouth pain, tongue or tooth problems  Ears - no hearing loss, ear pain, fullness, no swallowing problems  Cardiac - no CP, PND, orthopnea, edema, palpitations or syncope  Chest - no breast masses  Resp - no wheezing, chronic coughing, dyspnea  GI - no heartburn, nausea, vomiting, change in bowel habits, bleeding, hemorrhoids  Urinary - no dysuria, hematuria, flank pain, urgency, frequency  Endo - no polyuria, polydipsia, nocturia, hot flashes    Visit Vitals  /78   Pulse 79   Temp 98.3 °F (36.8 °C) (Oral)   Resp 14   Ht 5' 11\" (1.803 m)   Wt 171 lb (77.6 kg)   SpO2 98%   BMI 23.85 kg/m²     Affect is appropriate. Mood stable  No apparent distress  HEENT --Anicteric sclerae. No thyromegaly, JVD, or bruits. Neck supple w from. No clear areas of soft tissue or bony tenderness in the neck/trapezius area  Lungs --Clear to auscultation, normal percussion. Heart --Regular rate and rhythm, no murmurs, rubs, gallops, or clicks. Chest wall --Nontender to palpation. PMI normal.  Abdomen -- Soft and nontender, no hepatosplenomegaly or masses. Extremities -- Without cyanosis, clubbing, edema. 2+ pulses equally and bilaterally.     LABS  From 10/11 showed        hba1c 5.8, ldl-p 1008, chol 197, tg 200, hdl 45, ldl-c 112,                   psa 0.70, 2 hr   From 6/12  showed  gluc 104, cr 0.94, gfr 90,  alt 20, hba1c 5.7, ldl-p 2204, chol 197, tg 225, hdl 45, ldl-c 107,  tsh 0.62  From 11/12 showed gluc 100, cr 0.94, gfr 89                                ldl-p 1356                             test 316  From 5/13  showed  gluc 95,   cr 0.86                         hba1c 5.6, ldl-p 1183,  chol 147, tg 171, hdl 53, ldl-c 60,            test 313  From 5/13 showed                                test 196, lh 3.0, psa 0.60  From 11/13 showed        hba1c 5.5,     chol 164, tg 165, hdl 47, ldl-c 84  From 3/14 showed   gluc 91,   cr 0.83, gfr 96,  alt 42,      chol 151, tg 160, hdl 42, ldl-c 77,   wbc 7.7,   hb 15.2, plt 249,          psa 0.60, esr 2, spep neg, cea 0.9, crp 1.30, IL-6 1.2  From 5/14 showed   gluc 156, cr 1.02, gfr>60, alt 61,                 wbc 12.8, hb 14.6, plt 231, b12 722, fol>24(on pred)  From 10/14 showed gluc 101, cr 0.90, gfr 93,  alt 45, hba1c 5.7  From 5/15 showed        hba1c 5.7,     chol 155, tg 157, hdl 41, ldl-c 83,   wbc 6.6,   hb 14.6, plt 191, test 103,         psa 0.80  From 11/15 showed        hba1c 5.6,     chol 152, tg 159, hdl 49, ldl-c 81,          test 228  From 5/16 showed           chol 142, tg 118, hdl 38, ldl-c 80,   wbc 6.8,   hb 15.7, plt 203, test 395,         psa 0.9  From 11/16 showed gluc 103, cr 1.14, gfr>60, alt 73,                 test 346  From 5/17 showed           chol 139, tg 182, hdl 45, ldl-c 58,   wbc 7.2,   hb 15.7, plt 217,          psa 0.60, hep c neg  From 11/17 showed gluc 92,  cr 1.00l, gfr>60, alt 59, hba1c 5.4,                test 884  From 6/18 showed          chol 153, tg 163, hdl 44, ldl-c 76,   wbc 6.1,   hb 15.3, plt 206, test 382,         psa 0.70  From 12/18 showed gluc 108, cr 1.01, gfr>60, alt 55, hba1c 5.5,                test 466  From 7/19 showed   gluc 100, cr 0.98, gfr>60,       chol 156, tg 126, hdl 51, ldl-c 80,   wbc 6.7,   hb 15.2, plt 212, test 333,         psa 0.80  From 11/19 showed gluc 89,   cr 0.97, gfr>60,                  wbc 6.9,   hb 15.7, plt 248    Results for orders placed or performed during the hospital encounter of 74/26/50   METABOLIC PANEL, BASIC   Result Value Ref Range    Sodium 143 136 - 145 mmol/L    Potassium 4.8 3.5 - 5.5 mmol/L    Chloride 109 100 - 111 mmol/L    CO2 32 21 - 32 mmol/L    Anion gap 2 (L) 3.0 - 18 mmol/L    Glucose 93 74 - 99 mg/dL    BUN 17 7.0 - 18 MG/DL    Creatinine 0.95 0.6 - 1.3 MG/DL    BUN/Creatinine ratio 18 12 - 20      GFR est AA >60 >60 ml/min/1.73m2    GFR est non-AA >60 >60 ml/min/1.73m2    Calcium 9.1 8.5 - 10.1 MG/DL     Patient Active Problem List   Diagnosis Code    Dyslipidemia E78.5    S/P lumbar spinal fusion Z98.1    Hypogonadism male E29.1    Erectile dysfunction N52.9    IFG (impaired fasting glucose) R73.01    Primary hypertension I10    Chronic pain syndrome G89.4    Spondylosis of lumbar region without myelopathy or radiculopathy M47.816    Encounter for long-term (current) drug use Z79.899    Cervical spinal stenosis M48.02    S/P cervical spinal fusion Z98.1     ASSESSMENT AND PLAN:  1. Hypertension. Continue current regimen. 2.  Fatty liver. Wt loss, exercise, abstinence reiterated  3. Dyslipidemia. Continue current regimen. 4.  FH colon ca. F/U colo 2020  5. Prediabetes. Wt loss as below  6. ED. Prn levitra  7. Hypogonadism. Continue current regimen. 8.  Chronic pain. F/U pain clinic  9. Overweight. Congratulated him on the wt loss  10. Spine. Doing well        RTC 7/20    Above conditions discussed at length and patient vocalized understanding.   All questions answered to patient satisfaction

## 2020-01-21 ENCOUNTER — OFFICE VISIT (OUTPATIENT)
Dept: ORTHOPEDIC SURGERY | Age: 65
End: 2020-01-21

## 2020-01-21 VITALS
TEMPERATURE: 97.7 F | SYSTOLIC BLOOD PRESSURE: 160 MMHG | HEIGHT: 71 IN | WEIGHT: 165 LBS | OXYGEN SATURATION: 97 % | DIASTOLIC BLOOD PRESSURE: 90 MMHG | BODY MASS INDEX: 23.1 KG/M2 | HEART RATE: 89 BPM

## 2020-01-21 DIAGNOSIS — Z98.1 S/P CERVICAL SPINAL FUSION: Primary | ICD-10-CM

## 2020-01-21 DIAGNOSIS — G95.9 CERVICAL MYELOPATHY (HCC): ICD-10-CM

## 2020-01-21 NOTE — PROGRESS NOTES
Vaibhav Ortiz presents today for   Chief Complaint   Patient presents with    Neck Pain     6 week post up follow up       Is someone accompanying this pt? no    Is the patient using any DME equipment during OV? no    Depression Screening:  3 most recent PHQ Screens 10/21/2019   Little interest or pleasure in doing things Not at all   Feeling down, depressed, irritable, or hopeless Not at all   Total Score PHQ 2 0       Learning Assessment:  Learning Assessment 11/13/2019   PRIMARY LEARNER Patient   HIGHEST LEVEL OF EDUCATION - PRIMARY LEARNER  SOME COLLEGE   BARRIERS PRIMARY LEARNER NONE   CO-LEARNER CAREGIVER No   PRIMARY LANGUAGE ENGLISH    NEED -   LEARNER PREFERENCE PRIMARY DEMONSTRATION   LEARNING SPECIAL TOPICS -   ANSWERED BY patient   RELATIONSHIP SELF   ASSESSMENT COMMENT -       Abuse Screening:  Abuse Screening Questionnaire 7/9/2019   Do you ever feel afraid of your partner? N   Are you in a relationship with someone who physically or mentally threatens you? N   Is it safe for you to go home? Y       Fall Risk  Fall Risk Assessment, last 12 mths 10/21/2019   Able to walk? Yes   Fall in past 12 months? No       OPIOID RISK TOOL  No flowsheet data found. Coordination of Care:  1. Have you been to the ER, urgent care clinic since your last visit? no  Hospitalized since your last visit? no    2. Have you seen or consulted any other health care providers outside of the [address] [place] [address] since your last visit? no Include any pap smears or colon screening.  none

## 2020-01-21 NOTE — PROGRESS NOTES
[facility]  [address]  [place] [address]  Phone: [phone]  Fax: [phone]  PROGRESS NOTE  Patient: Abraham Huang                MRN: [id number]       SSN: [id number]  YOB: 1955        AGE: 71 y.o. SEX: male  Body mass index is 23.01 kg/m². PCP: Mark Perkins MD  01/21/20    Chief Complaint   Patient presents with    Neck Pain     6 week post up follow up       [facility], RADIOGRAPHS, and PLAN:     HISTORY OF PRESENT ILLNESS:  Mr. Kahlil Medrano returns today. He is six weeks out from his three level cervical discectomy and fusion. He is quite ecstatic. He has had total relief of his neck and dramatic relief of his arm symptoms. He says he gets occasional tingling. PHYSICAL EXAMINATION:  His balance is good. His strength is good. His wound is healed and dry. RADIOGRAPHS:  X-rays demonstrate appropriate alignment and fixation. ASSESSMENT/PLAN: We will see him back again in six weeks for routine followup. cc: Kiki Krabbe, M.D. Past Medical History:   Diagnosis Date    Arthritis     Chronic back pain     Dr. Naomy Lee Dyslipidemia     Erectile dysfunction     Fatty liver     US 2002, serologies negative; fib-4 1.06 from 3/14    FHx: colon cancer     H/O bone scan 3/14    negative outside of [facility]    HTN (hypertension)     Hypogonadism male     negative pituitary MRI 2013    Internal hemorrhoids 11/15    Dr Javy Busch    Lower urinary tract symptoms (LUTS) 5/16    mild    Lumbago     MRI 1/14 and CT 3/14 w lumbar spinal stenosis severe    Lumbar post-laminectomy syndrome     Lumbar spinal stenosis     Overweight (BMI 25.0-29. 9)     IF 12/18 start weight 194 lbs    Prediabetes     2 hr gtt 124 from 10/11    Rosacea     S/P lumbar spinal fusion     Spasm of muscle     Status post lumbar laminectomy     Thoracic or lumbosacral neuritis or radiculitis, unspecified Family History   Problem Relation Age of Onset    Colon Cancer Mother     Diabetes Mother     Cancer Father         H&N    Hypertension Father     Stroke Father        Current Outpatient Medications   Medication Sig Dispense Refill    testosterone cypionate (DEPO-TESTOSTERONE) 100 mg/mL injection use as directed for TESTOSTERONE INJECTIONS 1 Vial 5    hydroCHLOROthiazide (HYDRODIURIL) 25 mg tablet Take 1 Tab by mouth daily. 90 Tab 3    cyclobenzaprine (FLEXERIL) 10 mg tablet Take 10 mg by mouth three (3) times daily as needed for Muscle Spasm(s).  amLODIPine (NORVASC) 2.5 mg tablet Take 1 Tab by mouth daily. (Patient taking differently: Take 1 Tab by mouth daily. one tablet) 30 Tab 11    losartan-hydroCHLOROthiazide (HYZAAR) 100-25 mg per tablet take 1 tablet by mouth once daily 90 Tab 3    Needle, Disp, 18 G (BD REGULAR BEVEL NEEDLES) 18 gauge x 1 1/2\" ndle USE TO DRAW UP TESTOSTERONE AND THEN CHANGE NEEDLE TO GIVE TESTOSTERONE 25 Each 1    vardenafil (LEVITRA) 20 mg tablet Take 20 mg by mouth as needed (ED). 30 Tab 11    atorvastatin (LIPITOR) 10 mg tablet take 1 tablet by mouth once daily 90 Tab 3    Syringe, Disposable, 3 mL syrg Pt to use with Testosterone injections. 25 Syringe 1    BD REGULAR BEVEL NEEDLES 18 gauge x 1 1/2\" ndle USE TO DRAW UP TESTOSTERONE AND THEN CHANGE NEEDLE TO GIVE TESTOSTERONE 25 Each 1    losartan (COZAAR) 100 mg tablet Take 1 Tab by mouth daily. 90 Tab 3    HYDROcodone-acetaminophen (NORCO)  mg tablet Take 1 Tab by mouth every four to six (4-6) hours as needed for Pain for up to 14 days. Max Daily Amount: 6 Tabs. (Patient taking differently: Take 1 Tab by mouth every four (4) hours. for pain) 30 Tab 0    topiramate (TOPAMAX) 25 mg tablet Take 1-2 Tabs by mouth two (2) times a day. 120 Tab 1    naloxone (NARCAN) 4 mg/actuation nasal spray Use 1 spray intranasally into 1 nostril.  2 Each 0    naloxone 4 mg/actuation spry 4 mg by Nasal route once as needed (opioid overdose) for up to 1 dose. May substitute 2 mg syringe if insurance requires 1 Package 0    HYDROcodone-acetaminophen (NORCO)  mg tablet Take 0.5-1 Tabs by mouth every four (4) hours as needed for Pain (chronic) for up to 30 days. Indications: Pain 180 Tab 0       Allergies   Allergen Reactions    Lisinopril Other (comments)     ED       Past Surgical History:   Procedure Laterality Date    CHEST SURGERY PROCEDURE UNLISTED      resection of a RUL lung lesion for recurring polyps 1980s    HX CATARACT REMOVAL  09/2014    Dr Zeb West right     HX CERVICAL FUSION  12/2019    Dr Juan Manuel Whatley; ant decomp and fusion C3-6, structural allograftx3, ant cerv plate fixation P9-1 w K2M ant cervical locking plate and screws     HX COLONOSCOPY      negative Dr. Veronica Coronado 2005, 2010; Dr Kadie Ogden 11/15 hemorrhoids    Lou Quiet      Dr Rowe Remedies 10/17, L 2/18    [facility]  2012    Dr. Deshaun Chacko right buttock    HX LUMBAR FUSION  2014    L3/4 for cauda equina, Dr. Juan Manuel Whatley       Past Medical History:   Diagnosis Date    Arthritis     Chronic back pain     Dr. Lunsford Fraction Dyslipidemia     Erectile dysfunction     Fatty liver     US 2002, serologies negative; fib-4 1.06 from 3/14    FHx: colon cancer     H/O bone scan 3/14    negative outside of [facility]    HTN (hypertension)     Hypogonadism male     negative pituitary MRI 2013    Internal hemorrhoids 11/15    Dr Urbano Delay urinary tract symptoms (LUTS) 5/16    mild    Lumbago     MRI 1/14 and CT 3/14 w lumbar spinal stenosis severe    Lumbar post-laminectomy syndrome     Lumbar spinal stenosis     Overweight (BMI 25.0-29. 9)     IF 12/18 start weight 194 lbs    Prediabetes     2 hr gtt 124 from 10/11    Rosacea     S/P lumbar spinal fusion     Spasm of muscle     Status post lumbar laminectomy     Thoracic or lumbosacral neuritis or radiculitis, unspecified        Social History     Socioeconomic History    Marital status:      Spouse name: Not on file    Number of children: 3    Years of education: Not on file    Highest education level: Not on file   Occupational History    Occupation: [employer]   Social Needs    Financial resource strain: Not on file    Food insecurity:     Worry: Not on file     Inability: Not on file   Apieron needs:     Medical: Not on file     Non-medical: Not on file   Tobacco Use    Smoking status: Former Smoker     Types: Cigarettes    Smokeless tobacco: Never Used   Substance and Sexual Activity    Alcohol use: Yes     Alcohol/week: 0.0 standard drinks     Comment: social    Drug use: No    Sexual activity: Not on file   Lifestyle    Physical activity:     Days per week: Not on file     Minutes per session: Not on file    Stress: Not on file   Relationships    Social connections:     Talks on phone: Not on file     Gets together: Not on file     Attends Jain service: Not on file     Active member of club or organization: Not on file     Attends meetings of clubs or organizations: Not on file     Relationship status: Not on file    Intimate partner violence:     Fear of current or ex partner: Not on file     Emotionally abused: Not on file     Physically abused: Not on file     Forced sexual activity: Not on file   Other Topics Concern    Not on file   Social History Narrative    Not on file         REVIEW OF SYSTEMS:   CONSTITUTIONAL SYMPTOMS:  Negative. EYES:  Negative. EARS, NOSE, THROAT AND MOUTH:  Negative. CARDIOVASCULAR:  Negative. RESPIRATORY:  Negative. GENITOURINARY: Per HPI. GASTROINTESTINAL:  Per HPI. INTEGUMENTARY (SKIN AND/OR BREAST):  Negative. MUSCULOSKELETAL: Per HPI.   ENDOCRINE/RHEUMATOLOGIC:  Negative. NEUROLOGICAL:  Per HPI. HEMATOLOGIC/LYMPHATIC:  Negative. ALLERGIC/IMMUNOLOGIC:  Negative. PSYCHIATRIC:  Negative.     PHYSICAL EXAMINATION:   Visit Vitals  /90 (BP 1 Location: Left arm, BP Patient Position: Sitting)   Pulse 89   Temp 97.7 °F (36.5 °C) (Oral)   Ht 5' 11\" (1.803 m)   Wt 165 lb (74.8 kg)   SpO2 97% Comment: RA   BMI 23.01 kg/m²    PAIN SCALE: 1/10    CONSTITUTIONAL: The patient is in no apparent distress and is alert and oriented x 3. HEENT: Normocephalic. Hearing grossly intact. NECK: Supple and symmetric. no tenderness, or masses were felt. RESPIRATORY: No labored breathing. CARDIOVASCULAR: The carotid pulses were normal. Peripheral pulses were 2+. CHEST: Normal AP diameter and normal contour without any kyphoscoliosis. LYMPHATIC: No lymphadenopathy was appreciated in the neck, axillae or groin. SKIN:  Incision healing well, no drainage, no erythema, no hernia, no seroma, no swelling, no dehiscence, incision well approximated. Negative for scars, rashes, lesions, or ulcers on the right upper, right lower, left upper, left lower and trunk. NEUROLOGICAL: Alert and oriented x 3. Ambulation without assistive device. FWB. EXTREMITIES: See musculoskeletal.  MUSCULOSKELETAL:   Head and Neck:  Negative for misalignment, asymmetry, crepitation, defects, tenderness masses or effusions.  Left Upper Extremity: Inspection, percussion and palpation performed. Reals sign is negative.  Right Upper Extremity: Inspection, percussion and palpation performed. Reals sign is negative.  Spine, Ribs and Pelvis: Inspection, percussion and palpation performed. Negative for misalignment, asymmetry, crepitation, defects, tenderness masses or effusions.  Left Lower Extremity: Inspection, percussion and palpation performed. Negative straight leg raise.  Right Lower Extremity: Inspection, percussion and palpation performed. Negative straight leg raise. SPINE EXAM:     Cervical spine: Neck is midline. Normal muscle tone. No focal atrophy is noted. ASSESSMENT    ICD-10-CM ICD-9-CM    1. S/P cervical spinal fusion Z98.1 V45.4 AMB POC XRAY, SPINE, CERVICAL; 2 OR 3   2.  Cervical myelopathy ([facility] G95.9 721.1        Written by Sultana Payne, as dictated by Alesia Eid MD.    I, Dr. Alesia Eid MD, confirm that all documentation is accurate.

## 2020-01-22 ENCOUNTER — OFFICE VISIT (OUTPATIENT)
Dept: INTERNAL MEDICINE CLINIC | Age: 65
End: 2020-01-22

## 2020-01-22 VITALS
TEMPERATURE: 98.3 F | BODY MASS INDEX: 23.94 KG/M2 | RESPIRATION RATE: 14 BRPM | WEIGHT: 171 LBS | SYSTOLIC BLOOD PRESSURE: 126 MMHG | HEART RATE: 79 BPM | OXYGEN SATURATION: 98 % | HEIGHT: 71 IN | DIASTOLIC BLOOD PRESSURE: 78 MMHG

## 2020-01-22 DIAGNOSIS — E78.5 DYSLIPIDEMIA: ICD-10-CM

## 2020-01-22 DIAGNOSIS — Z23 ENCOUNTER FOR IMMUNIZATION: ICD-10-CM

## 2020-01-22 DIAGNOSIS — E29.1 HYPOGONADISM MALE: ICD-10-CM

## 2020-01-22 DIAGNOSIS — R73.01 IFG (IMPAIRED FASTING GLUCOSE): ICD-10-CM

## 2020-01-22 DIAGNOSIS — I10 ESSENTIAL HYPERTENSION: Primary | ICD-10-CM

## 2020-01-22 DIAGNOSIS — Z12.5 SCREENING FOR MALIGNANT NEOPLASM OF PROSTATE: ICD-10-CM

## 2020-01-22 DIAGNOSIS — Z98.1 S/P LUMBAR SPINAL FUSION: ICD-10-CM

## 2020-01-22 DIAGNOSIS — Z98.1 S/P CERVICAL SPINAL FUSION: ICD-10-CM

## 2020-01-22 PROBLEM — M47.12 CERVICAL SPONDYLOSIS WITH MYELOPATHY AND RADICULOPATHY: Status: RESOLVED | Noted: 2019-12-05 | Resolved: 2020-01-22

## 2020-01-22 PROBLEM — M47.22 CERVICAL SPONDYLOSIS WITH MYELOPATHY AND RADICULOPATHY: Status: RESOLVED | Noted: 2019-12-05 | Resolved: 2020-01-22

## 2020-01-22 NOTE — PROGRESS NOTES
Shane Yee is a 71 y.o. male who presents for routine immunizations. Per verbal order from [address] for prevnar 13 (right deltoid)  He denies any symptoms , reactions or allergies that would exclude them from being immunized today. Risks and adverse reactions were discussed and the VIS was given to them. All questions were addressed. He was observed for 10 min post injection. There were no reactions observed.     Curt Easley LPN

## 2020-01-22 NOTE — PATIENT INSTRUCTIONS
Vaccine Information Statement    Pneumococcal Conjugate Vaccine (PCV13): What You Need to Know    Many Vaccine Information Statements are available in [place]vian and other languages. See www.immunize.org/vis  Hojas de información sobre vacunas están disponibles en español y en muchos otros idiomas. Visite www.immunize.org/vis    1. Why get vaccinated? Pneumococcal conjugate vaccine (PCV13) can prevent pneumococcal disease. Pneumococcal disease refers to any illness caused by pneumococcal bacteria. These bacteria can cause many types of illnesses, including pneumonia, which is an infection of the lungs. Pneumococcal bacteria are one of the most common causes of pneumonia. Besides pneumonia, pneumococcal bacteria can also cause:   Ear infections   Sinus infections   Meningitis (infection of the tissue covering the brain and spinal cord)   Bacteremia (bloodstream infection)    Anyone can get pneumococcal disease, but children under 3years of age, people with certain medical conditions, adults 72 years or older, and cigarette smokers are at the highest risk. Most pneumococcal infections are mild. However, some can result in long-term problems, such as brain damage or hearing loss. Meningitis, bacteremia, and pneumonia caused by pneumococcal disease can be fatal.     2. PCV13     PCV13 protects against 13 types of bacteria that cause pneumococcal disease. Infants and young children usually need 4 doses of pneumococcal conjugate vaccine, at 2, 4, 6, and 1515 months of age. In some cases, a child might need fewer than 4 doses to complete PCV13 vaccination. A dose of PCV13 is also recommended for anyone 2 years or older with certain medical conditions if they did not already receive PCV13. This vaccine may be given to adults 72 years or older based on discussions between the patient and health care provider.     3. Talk with your health care provider    Tell your vaccine provider if the person getting the vaccine:   Has had an allergic reaction after a previous dose of PCV13, to an earlier pneumococcal conjugate vaccine known as PCV7, or to any vaccine containing diphtheria toxoid (for example, DTaP), or has any severe, life-threatening allergies. In some cases, your health care provider may decide to postpone PCV13 vaccination to a future visit. People with minor illnesses, such as a cold, may be vaccinated. People who are moderately or severely ill should usually wait until they recover before getting PCV13. Your health care provider can give you more information. 4. Risks of a vaccine reaction     Redness, swelling, pain, or tenderness where the shot is given, and fever, loss of appetite, fussiness (irritability), feeling tired, headache, and chills can happen after PCV13. Shweta Kapilmalorie children may be at increased risk for seizures caused by fever after PCV13 if it is administered at the same time as inactivated influenza vaccine. Ask your health care provider for more information. People sometimes faint after medical procedures, including vaccination. Tell your provider if you feel dizzy or have vision changes or ringing in the ears. As with any medicine, there is a very remote chance of a vaccine causing a severe allergic reaction, other serious injury, or death. 5. What if there is a serious problem? An allergic reaction could occur after the vaccinated person leaves the clinic. If you see signs of a severe allergic reaction (hives, swelling of the face and throat, difficulty breathing, a fast heartbeat, dizziness, or weakness), call 9-1-1 and get the person to the nearest hospital.    For other signs that concern you, call your health care provider. Adverse reactions should be reported to the Vaccine Adverse Event Reporting System (VAERS). Your health care provider will usually file this report, or you can do it yourself. Visit the VAERS website at www.vaers. hhs.gov or call 6-849.789.4876. VAERS is only for reporting reactions, and [facility] staff do not give medical advice. 6. The National Vaccine Injury Compensation Program    The [facility] Vaccine Injury Compensation Program (VICP) is a federal program that was created to compensate people who may have been injured by certain vaccines. Visit the VICP website at www.[facility].gov/vaccinecompensation or call 1-441.883.8262 to learn about the program and about filing a claim. There is a time limit to file a claim for compensation. 7. How can I learn more?  Ask your health care provider.  Call your local or state health department.  Contact the Centers for Disease Control and Prevention (CDC):  - Call 2-644.357.7391 (1-800-CDC-INFO) or  - Visit CDCs website at www.cdc.gov/vaccines    Vaccine Information Statement (Interim)  PCV13   10/30/2019  [address] SUDEEP Ramirez [id number]   Department of Health and Human Services  Centers for Disease Control and Prevention    Office Use Only

## 2020-01-22 NOTE — PROGRESS NOTES
iDdyllan Nagy presents today for   Chief Complaint   Patient presents with    Hypertension     6 month follow up with labs              Depression Screening:  3 most recent PHQ Screens 10/21/2019   Little interest or pleasure in doing things Not at all   Feeling down, depressed, irritable, or hopeless Not at all   Total Score PHQ 2 0       Learning Assessment:  Learning Assessment 11/13/2019   PRIMARY LEARNER Patient   HIGHEST LEVEL OF EDUCATION - PRIMARY LEARNER  SOME COLLEGE   BARRIERS PRIMARY LEARNER NONE   CO-LEARNER CAREGIVER No   PRIMARY LANGUAGE ENGLISH    NEED -   LEARNER PREFERENCE PRIMARY DEMONSTRATION   LEARNING SPECIAL TOPICS -   ANSWERED BY patient   RELATIONSHIP SELF   ASSESSMENT COMMENT -       Abuse Screening:  Abuse Screening Questionnaire 7/9/2019   Do you ever feel afraid of your partner? N   Are you in a relationship with someone who physically or mentally threatens you? N   Is it safe for you to go home? Y       Fall Risk  Fall Risk Assessment, last 12 mths 10/21/2019   Able to walk? Yes   Fall in past 12 months? No     Health Maintenance Due   Topic Date Due    Pneumococcal 65+ years (1 of 1 - PPSV23) 10/03/2019    GLAUCOMA SCREENING Q2Y  12/18/2019    Shingrix Vaccine Age 50> (2 of 2) 12/31/2019           Coordination of Care:  1. Have you been to the ER, urgent care clinic since your last visit? Hospitalized since your last visit? no    2. Have you seen or consulted any other health care providers outside of the [address] [place] [address] since your last visit? Include any pap smears or colon screening.  no

## 2020-02-03 RX ORDER — TOPIRAMATE 25 MG/1
TABLET ORAL
Qty: 120 TAB | Refills: 1 | Status: SHIPPED | OUTPATIENT
Start: 2020-02-03 | End: 2020-03-02 | Stop reason: ALTCHOICE

## 2020-03-02 ENCOUNTER — DOCUMENTATION ONLY (OUTPATIENT)
Dept: INTERNAL MEDICINE CLINIC | Age: 65
End: 2020-03-02

## 2020-03-02 ENCOUNTER — OFFICE VISIT (OUTPATIENT)
Dept: ORTHOPEDIC SURGERY | Age: 65
End: 2020-03-02

## 2020-03-02 VITALS
HEART RATE: 90 BPM | DIASTOLIC BLOOD PRESSURE: 98 MMHG | WEIGHT: 175 LBS | TEMPERATURE: 98.2 F | SYSTOLIC BLOOD PRESSURE: 155 MMHG | BODY MASS INDEX: 24.5 KG/M2 | OXYGEN SATURATION: 100 % | RESPIRATION RATE: 16 BRPM | HEIGHT: 71 IN

## 2020-03-02 DIAGNOSIS — Z98.1 S/P CERVICAL SPINAL FUSION: ICD-10-CM

## 2020-03-02 DIAGNOSIS — G95.9 CERVICAL MYELOPATHY (HCC): Primary | ICD-10-CM

## 2020-03-02 NOTE — PATIENT INSTRUCTIONS
Shoulder Blade: Exercises  Introduction  Here are some examples of exercises for you to try. The exercises may be suggested for a condition or for rehabilitation. Start each exercise slowly. Ease off the exercises if you start to have pain. You will be told when to start these exercises and which ones will work best for you. How to do the exercises  Shoulder roll    1. Stand tall with your chin slightly tucked. Imagine that a string at the top of your head is pulling you straight up. 2. Keep your arms relaxed. All motion will be in your shoulders. 3. Shrug your shoulders up toward your ears, then up and back. [place] your shoulders down and back, like you're sliding your hands down into your back pants pockets. 4. Repeat the circles at least 2 to 4 times. 5. This exercise is also helpful anytime you want to relax. Lower neck and upper back stretch    1. With your arms about shoulder height, clasp your hands in front of you. 2. Drop your chin toward your chest.  3. Reach straight forward so you are rounding your upper back. Think about pulling your shoulder blades apart. Julianna Chaudhari feel a stretch across your upper back and shoulders. Hold for at least 6 seconds. 4. Repeat 2 to 4 times. Triceps stretch    1. Reach your arm straight up. 2. Keeping your elbow in place, bend your arm and reach your hand down behind your back. 3. With your other hand, apply gentle pressure to the bent elbow. Julianna Chaudhari feel a stretch at the back of your upper arm and shoulder. Hold about 6 seconds. 4. Repeat 2 to 4 times with each arm. Shoulder stretch    1. Relax your shoulders. 2. Raise one arm to shoulder height, and reach it across your chest.  3. Pull the arm slightly toward you with your other arm. This will help you get a gentle stretch. Hold for about 6 seconds. 4. Repeat 2 to 4 times. Shoulder blade squeeze    1. Sit or stand up tall with your arms at your sides.   2. Keep your shoulders relaxed and down, not shrugged. 3. Squeeze your shoulder blades together. Hold for 6 seconds, then relax. 4. Repeat 8 to 12 times. Straight-arm shoulder blade squeeze    1. Sit or stand tall. Relax your shoulders. 2. With palms down, hold your elastic tubing or band straight out in front of you. 3. Start with slight tension in the tubing or band, with your hands about shoulder-width apart. 4. Slowly pull straight out to the sides, squeezing your shoulder blades together. Keep your arms straight and at shoulder height. Slowly release. 5. Repeat 8 to 12 times. Rowing    1. [place] your elastic tubing or band at about waist height. Take one end in each hand. 2. Sit or stand with your feet hip-width apart. 3. Hold your arms straight in front of you. Adjust your distance to create slight tension in the tubing or band. 4. Slightly tuck your chin. Relax your shoulders. 5. Without shrugging your shoulders, pull straight back. Your elbows will pass alongside your waist.    Pull-downs    1. [place] your elastic tubing or band in the top of a closed door. Take one end in each hand. 2. Either sit or stand, depending on what is more comfortable. If you feel unsteady, sit on a chair. 3. Start with your arms up and comfortably apart, elbows straight. There should be a slight tension in the tubing or band. 4. Slightly tuck your chin, and look straight ahead. 5. Keeping your back straight, slowly pull down and back, bending your elbows. 6. Stop where your hands are level with your chin, in a \"goalpost\" position. 7. Repeat 8 to 12 times. Chest T stretch    1. Lie on your back. Raise your knees so they are bent. Plant your feet on the floor, hip-width apart. 2. Tuck your chin, and relax your shoulders. 3. Reach your arms straight out to the sides. If you don't feel a mild stretch in your shoulders and across your chest, use a foam roll or a tightly rolled blanket under your spine, from your tailbone to your head.   4. Relax in this position for at least 15 to 30 seconds while you breathe normally. Repeat 2 to 4 times. 5. As you get used to this stretch, keep adding a little more time until you are able relax in this position for 2 or 3 minutes. When you can relax for at least 2 minutes, you only need to do the exercise 1 time per session. Chest goalpost stretch    1. Lie on your back. Raise your knees so they are bent. Plant your feet on the floor, hip-width apart. 2. Tuck your chin, and relax your shoulders. 3. Reach your arms straight out to the sides. 4. Bend your arms at the elbows, with your hands pointed toward the top of your head. Your arms should make an L on either side of your head. Your palms should be facing up. 5. If you don't feel a mild stretch in your shoulders and across your chest, use a foam roll or tightly rolled blanket under your spine, from your tailbone to your head. 6. Relax in this position for at least 15 to 30 seconds while you breathe normally. Repeat 2 to 4 times. 7. Each day you do this exercise, add a little more time until you can relax in this position for 2 or 3 minutes. When you can relax for at least 2 minutes, you only need to do the exercise 1 time per session. Follow-up care is a key part of your treatment and safety. Be sure to make and go to all appointments, and call your doctor if you are having problems. It's also a good idea to know your test results and keep a list of the medicines you take. Where can you learn more? Go to http://helder-janeth.info/. Enter [phone] in the search box to learn more about \"Shoulder Blade: Exercises. \"  Current as of: June 26, 2019  Content Version: 12.2  © 0961-5924 [employer]. Care instructions adapted under license by [employer] (which disclaims liability or warranty for this information).  If you have questions about a medical condition or this instruction, always ask your healthcare professional. [employer] disclaims any warranty or liability for your use of this information. Rotator Cuff: Exercises  Introduction  Here are some examples of exercises for you to try. The exercises may be suggested for a condition or for rehabilitation. Start each exercise slowly. Ease off the exercises if you start to have pain. You will be told when to start these exercises and which ones will work best for you. How to do the exercises  Pendulum swing    1. Hold on to a table or the back of a chair with your good arm. Then bend forward a little and let your sore arm hang straight down. This exercise does not use the arm muscles. Rather, use your legs and your hips to create movement that makes your arm swing freely. 2. Use the movement from your hips and legs to guide the slightly swinging arm back and forth like a pendulum (or elephant trunk). Then guide it in circles that start small (about the size of a dinner plate). Make the circles a bit larger each day, as your pain allows. 3. Do this exercise for 5 minutes, 5 to 7 times each day. 4. As you have less pain, try bending over a little farther to do this exercise. This will increase the amount of movement at your shoulder. Posterior stretching exercise    1. Hold the elbow of your injured arm with your other hand. 2. Use your hand to pull your injured arm gently up and across your body. You will feel a gentle stretch across the back of your injured shoulder. 3. Hold for at least 15 to 30 seconds. Then slowly lower your arm. 4. Repeat 2 to 4 times. Up-the-back stretch    1. Put your hand in your back pocket. Let it rest there to stretch your shoulder. 2. With your other hand, hold your injured arm (palm outward) behind your back by the wrist. Pull your arm up gently to stretch your shoulder. 3. Next, put a towel over your other shoulder. Put the hand of your injured arm behind your back. Now hold the back end of the towel.  With the other hand, hold the front end of the towel in front of your body. Pull gently on the front end of the towel. This will bring your hand farther up your back to stretch your shoulder. Overhead stretch    1. Standing about an arm's length away, grasp onto a solid surface. You could use a countertop, a doorknob, or the back of a sturdy chair. 2. With your knees slightly bent, bend forward with your arms straight. Lower your upper body, and let your shoulders stretch. 3. As your shoulders are able to stretch farther, you may need to take a step or two backward. 4. Hold for at least 15 to 30 seconds. Then stand up and relax. If you had stepped back during your stretch, step forward so you can keep your hands on the solid surface. 5. Repeat 2 to 4 times. Shoulder flexion (lying down)    1. Lie on your back, holding a wand with both hands. Your palms should face down as you hold the wand. 2. Keeping your elbows straight, slowly raise your arms over your head. Raise them until you feel a stretch in your shoulders, upper back, and chest.  3. Hold for 15 to 30 seconds. 4. Repeat 2 to 4 times. Shoulder rotation (lying down)    1. Lie on your back. Hold a wand with both hands with your elbows bent and palms up. 2. Keep your elbows close to your body, and move the wand across your body toward the sore arm. 3. Hold for 8 to 12 seconds. 4. Repeat 2 to 4 times. Wall climbing (to the side)    1. Stand with your side to a wall so that your fingers can just touch it at an angle about 30 degrees toward the front of your body. 2. Walk the fingers of your injured arm up the wall as high as pain permits. Try not to shrug your shoulder up toward your ear as you move your arm up. 3. Hold that position for a count of at least 15 to 20.  4. Walk your fingers back down to the starting position. 5. Repeat at least 2 to 4 times. Try to reach higher each time. Wall climbing (to the front)    1.  Face a wall, and stand so your fingers can just touch it. 2. Keeping your shoulder down, walk the fingers of your injured arm up the wall as high as pain permits. (Don't shrug your shoulder up toward your ear.)  3. Hold your arm in that position for at least 15 to 30 seconds. 4. Slowly walk your fingers back down to where you started. 5. Repeat at least 2 to 4 times. Try to reach higher each time. Shoulder blade squeeze    1. Stand with your arms at your sides, and squeeze your shoulder blades together. Do not raise your shoulders up as you squeeze. 2. Hold 6 seconds. 3. Repeat 8 to 12 times. Scapular exercise: Arm reach    1. Lie flat on your back. This exercise is a very slight motion that starts with your arms raised (elbows straight, arms straight). 2. From this position, reach higher toward the [place] or ceiling. Keep your elbows straight. All motion should be from your shoulder blade only. 3. Relax your arms back to where you started. 4. Repeat 8 to 12 times. Arm raise to the side    1. Slowly raise your injured arm to the side, with your thumb facing up. Raise your arm 60 degrees at the most (shoulder level is 90 degrees). 2. Hold the position for 3 to 5 seconds. Then lower your arm back to your side. If you need to, bring your \"good\" arm across your body and place it under the elbow as you lower your injured arm. Use your good arm to keep your injured arm from dropping down too fast.  3. Repeat 8 to 12 times. 4. When you first start out, don't hold any extra weight in your hand. As you get stronger, you may use a 1-pound to 2-pound dumbbell or a small can of food. Shoulder flexor and extensor exercise    1. Push forward (flex): Stand facing a wall or doorjamb, about 6 inches or less back. Hold your injured arm against your body. Make a closed fist with your thumb on top. Then gently push your hand forward into the wall with about 25% to 50% of your strength. Don't let your body move backward as you push. Hold for about 6 seconds. Relax for a few seconds. Repeat 8 to 12 times. 2. Push backward (extend): Stand with your back flat against a wall. Your upper arm should be against the wall, with your elbow bent 90 degrees (your hand straight ahead). Push your elbow gently back against the wall with about 25% to 50% of your strength. Don't let your body move forward as you push. Hold for about 6 seconds. Relax for a few seconds. Repeat 8 to 12 times. Scapular exercise: Wall push-ups    1. Stand facing a wall, about 12 inches to 18 inches away. 2. Place your hands on the wall at shoulder height. 3. Slowly bend your elbows and bring your face to the wall. Keep your back and hips straight. 4. Push back to where you started. 5. Repeat 8 to 12 times. 6. When you can do this exercise against a wall comfortably, you can try it against a counter. You can then slowly progress to the end of a couch, then to a sturdy chair, and finally to the floor. Scapular exercise: Retraction    1. Put the band around a solid object at about waist level. (A bedpost will work well.) Each hand should hold an end of the band. 2. With your elbows at your sides and bent to 90 degrees, pull the band back. Your shoulder blades should move toward each other. Then move your arms back where you started. 3. Repeat 8 to 12 times. 4. If you have good range of motion in your shoulders, try this exercise with your arms lifted out to the sides. Keep your elbows at a 90-degree angle. Raise the elastic band up to about shoulder level. Pull the band back to move your shoulder blades toward each other. Then move your arms back where you started. Internal rotator strengthening exercise    1. Start by tying a piece of elastic exercise material to a doorknob. You can use surgical tubing or Thera-Band. 2. Stand or sit with your shoulder relaxed and your elbow bent 90 degrees. Your upper arm should rest comfortably against your side.  Squeeze a rolled towel between your elbow and your body for comfort. This will help keep your arm at your side. 3. Hold one end of the elastic band in the hand of the painful arm. 4. Slowly rotate your forearm toward your body until it touches your belly. Slowly move it back to where you started. 5. Keep your elbow and upper arm firmly tucked against the towel roll or at your side. 6. Repeat 8 to 12 times. External rotator strengthening exercise    1. Start by tying a piece of elastic exercise material to a doorknob. You can use surgical tubing or Thera-Band. (You may also hold one end of the band in each hand.)  2. Stand or sit with your shoulder relaxed and your elbow bent 90 degrees. Your upper arm should rest comfortably against your side. Squeeze a rolled towel between your elbow and your body for comfort. This will help keep your arm at your side. 3. Hold one end of the elastic band with the hand of the painful arm. 4. Start with your forearm across your belly. Slowly rotate the forearm out away from your body. Keep your elbow and upper arm tucked against the towel roll or the side of your body until you begin to feel tightness in your shoulder. Slowly move your arm back to where you started. 5. Repeat 8 to 12 times. Follow-up care is a key part of your treatment and safety. Be sure to make and go to all appointments, and call your doctor if you are having problems. It's also a good idea to know your test results and keep a list of the medicines you take. Where can you learn more? Go to http://helder-janeth.info/. Enter Priscilla Rehman in the search box to learn more about \"Rotator Cuff: Exercises. \"  Current as of: June 26, 2019  Content Version: 12.2  © 2422-5816 [employer]. Care instructions adapted under license by [employer] (which disclaims liability or warranty for this information).  If you have questions about a medical condition or this instruction, always ask your healthcare professional. [employer], [facility] disclaims any warranty or liability for your use of this information. Neck Arthritis: Exercises  Introduction  Here are some examples of exercises for you to try. The exercises may be suggested for a condition or for rehabilitation. Start each exercise slowly. Ease off the exercises if you start to have pain. You will be told when to start these exercises and which ones will work best for you. How to do the exercises  Neck stretches to the side    1. This stretch works best if you keep your shoulder down as you lean away from it. To help you remember to do this, start by relaxing your shoulders and lightly holding on to your thighs or your chair. 2. Tilt your head toward your shoulder and hold for 15 to 30 seconds. Let the weight of your head stretch your muscles. 3. Repeat 2 to 4 times toward each shoulder. Chin tuck    1. Lie on the floor with a rolled-up towel under your neck. Your head should be touching the floor. 2. Slowly bring your chin toward your chest.  3. Hold for a count of 6, and then relax for up to 10 seconds. 4. Repeat 8 to 12 times. Active cervical rotation    1. Sit in a firm chair, or stand up straight. 2. Keeping your chin level, turn your head to the right, and hold for 15 to 30 seconds. 3. Turn your head to the left and hold for 15 to 30 seconds. 4. Repeat 2 to 4 times to each side. Shoulder blade squeeze    1. While standing, squeeze your shoulder blades together. 2. Do not raise your shoulders up as you are squeezing. 3. Hold for 6 seconds. 4. Repeat 8 to 12 times. Shoulder rolls    1. Sit comfortably with your feet shoulder-width apart. You can also do this exercise standing up. 2. Roll your shoulders up, then back, and then down in a smooth, circular motion. 3. Repeat 2 to 4 times. Follow-up care is a key part of your treatment and safety.  Be sure to make and go to all appointments, and call your doctor if you are having problems. It's also a good idea to know your test results and keep a list of the medicines you take. Where can you learn more? Go to http://helder-janeth.info/. Enter Z212 in the search box to learn more about \"Neck Arthritis: Exercises. \"  Current as of: June 26, 2019  Content Version: 12.2  © 8898-8039 [employer]. Care instructions adapted under license by [employer] (which disclaims liability or warranty for this information). If you have questions about a medical condition or this instruction, always ask your healthcare professional. [place] [address] any warranty or liability for your use of this information.

## 2020-03-02 NOTE — PROGRESS NOTES
The Prior Authorization request has been approved for Testosterone Cypionate 100MG/ML  [facility]. The authorization is valid from 02/01/2020 through 03/02/2021.

## 2020-03-02 NOTE — PROGRESS NOTES
[facility]  [address], [address]  [place]  Phone: [phone]  Fax: [phone]  PROGRESS NOTE  Patient: Abraham Salmeron                MRN: [id number]       SSN: [id number]  YOB: 1955        AGE: 71 y.o. SEX: male  Body mass index is 24.41 kg/m². PCP: Percy Purcell MD  03/02/20    Chief Complaint   Patient presents with    Neck Pain     neck pain, post op appt. HISTORY OF PRESENT ILLNESS:  Abraham Madrigal is a 71 y.o.  male with history of neck pain and R shoulder pain and weakness who had an ACDF C3-6 on 12/5/2019 for myelopathy. He does have some R shoulder joint issues as well. At last OV at his 6 wk post-op he was doing well and his x-rays looked good. Today, he continues to improved w/ his neck and shoulder pain has improved and his weakness has also improved. He has some mild swallowing difficulty that is getting better. He is doing great. Pain is aching and radiation of pain and R shoulder. Symptoms are worst: evening. Pain is worse with lifting. Pain is better with relaxation. Denies bladder/bowel dysfunction, saddle paresthesia, weakness, gait disturbance, or other neurological deficits. Current Medications: On Temazepam and [place] thru PM and PCP. No longer on Topamax    ASSESSMENT   Diagnoses and all orders for this visit:    1. Cervical myelopathy ([address]    2. S/P cervical spinal fusion         IMPRESSION AND PLAN:  This is a pt with neck and shoulder pain who is doing well 3 mo after his cervical fusion surgery. > Pt was given information on neck and shoulder exercises   > HEP  > Meds thru PM  > Mr. Deidre Madrigal has a reminder for a \"due or due soon\" health maintenance. I have asked that he contact his primary care provider, Percy Purcell MD, for follow-up on this health maintenance.   >\" We have informed patient to notify us for immediate appointment if he has any worsening neurogical symptoms or if an emergency situation presents, then call 911  >  has been reviewed and is appropriate  > Pt will follow-up in 3mo. Subjective    Work Supervisor for maintenance       Pain Scale: 5/10    Pain Assessment  3/2/2020   Location of Pain Neck   Location Modifiers (No Data)   Severity of Pain 5   Quality of Pain Throbbing   Quality of Pain Comment irritated    Duration of Pain A few minutes   Frequency of Pain Intermittent   Aggravating Factors Other (Comment)   Aggravating Factors Comment certain position & laying down on right side   Limiting Behavior No   Relieving Factors Other (Comment)   Relieving Factors Comment moving around helps   Result of Injury No         REVIEW OF SYSTEMS  Constitutional: Negative for fever, chills, or weight change. Respiratory: Negative for cough or shortness of breath. Cardiovascular: Negative for chest pain or palpitations. Gastrointestinal: Negative for incontinence, acid reflux, change in bowel habits, or constipation. Genitourinary: Negative for incontinence, dysuria and flank pain. Musculoskeletal: Positive for R shoulder pain. See HPI. Skin: Negative for rash. Neurological:no  radiculopathy. See HPI. Endo/Heme/Allergies: Negative. Psychiatric/Behavioral: Negative. PHYSICAL EXAMINATION  Visit Vitals  BP (!) 155/98 (BP 1 Location: Left arm, BP Patient Position: Sitting)   Pulse 90   Temp 98.2 °F (36.8 °C) (Oral)   Resp 16   Ht 5' 11\" (1.803 m)   Wt 175 lb (79.4 kg)   SpO2 100%   BMI 24.41 kg/m²         Accompanied by self. Constitutional:  Well developed, well nourished, in no acute distress. Psychiatric: Affect and mood are appropriate. Integumentary: No rashes or abrasions noted on exposed areas. Cardiovascular/Peripheral Vascular: +2 radial & pedal pulses. No peripheral edema is noted. Lymphatic:  No evidence of lymphedema. No cervical lymphadenopathy. SPINE/MUSCULOSKELETAL EXAM    Cervical spine:  Neck is midline.  Normal muscle tone. No focal atrophy is noted. Neck ROM intact with flexion, extension, turning right, turning left. Shoulder ROM intact. Tenderness to palpation none. Negative Spurling's sign. Negative Tinel's sign. Negative Real's sign. Sensation grossly intact to light touch. MOTOR:     Biceps Triceps Deltoids Wrist Ext Wrist Flex Hand Intrin   Right 5/5 5/5 5/5 5/5 5/5 5/5   Left 5/5 5/5 5/5 5/5 5/5 5/5       Ambulation without assistive device. FWB.    normal gait and station        PAST MEDICAL HISTORY   Past Medical History:   Diagnosis Date    Arthritis     Chronic back pain     Dr. Rina Aleman Dyslipidemia     Erectile dysfunction     Fatty liver     US 2002, serologies negative; fib-4 1.06 from 3/14    FHx: colon cancer     H/O bone scan 3/14    negative outside of d[facility]    HTN (hypertension)     Hypogonadism male     negative pituitary MRI 2013    Internal hemorrhoids 11/15    Dr Yeny Lovell    Lower urinary tract symptoms (LUTS) 5/16    mild    Lumbar post-laminectomy syndrome     Lumbar spinal stenosis     MRI 1/14 and CT 3/14 w lumbar spinal stenosis severe    Overweight (BMI 25.0-29. 9)     IF 12/18 start weight 194 lbs    Prediabetes 2011    2 hr [address] from 10/11    Rosacea     S/P lumbar spinal fusion     Thoracic or lumbosacral neuritis or radiculitis, unspecified        Past Surgical History:   Procedure Laterality Date    CHEST SURGERY PROCEDURE UNLISTED      resection of a RUL lung lesion for recurring polyps 1980s    HX CATARACT REMOVAL  09/2014    Dr Jonathan Barron right     HX CERVICAL FUSION  12/2019    Dr Felicita Mcfarlane; ant decomp and fusion C3-6, structural allograftx3, ant cerv plate fixation C2-8 w K2M ant cervical locking plate and screws     HX COLONOSCOPY      negative Dr. Daina Morris 2005, 2010; Dr Julia Sands 11/15 hemorrhoids    Louanna Pearlington      Dr Earline Belle 10/17, L 2/18    Cindy Chavez  2012    Dr. Mary Gomez right buttock    HX LUMBAR FUSION  2014    L3/4 for Dr. Bairon hawk   . MEDICATIONS      Current Outpatient Medications   Medication Sig Dispense Refill    testosterone cypionate (DEPO-TESTOSTERONE) 100 mg/mL injection use as directed for TESTOSTERONE INJECTIONS 1 Vial 5    losartan (COZAAR) 100 mg tablet Take 1 Tab by mouth daily. 90 Tab 3    cyclobenzaprine (FLEXERIL) 10 mg tablet Take 10 mg by mouth three (3) times daily as needed for Muscle Spasm(s).  Needle, Disp, 18 G (BD REGULAR BEVEL NEEDLES) 18 gauge x 1 1/2\" ndle USE TO DRAW UP TESTOSTERONE AND THEN CHANGE NEEDLE TO GIVE TESTOSTERONE 25 Each 1    vardenafil (LEVITRA) 20 mg tablet Take 20 mg by mouth as needed (ED). 30 Tab 11    atorvastatin (LIPITOR) 10 mg tablet take 1 tablet by mouth once daily 90 Tab 3    Syringe, Disposable, 3 mL syrg Pt to use with Testosterone injections. 25 Syringe 1    BD REGULAR BEVEL NEEDLES 18 gauge x 1 1/2\" ndle USE TO DRAW UP TESTOSTERONE AND THEN CHANGE NEEDLE TO GIVE TESTOSTERONE 25 Each 1    naloxone 4 mg/actuation spry 4 mg by Nasal route once as needed (opioid overdose) for up to 1 dose. May substitute 2 mg syringe if insurance requires 1 Package 0    hydroCHLOROthiazide (HYDRODIURIL) 25 mg tablet Take 1 Tab by mouth daily. 90 Tab 3    HYDROcodone-acetaminophen (NORCO)  mg tablet Take 1 Tab by mouth every four to six (4-6) hours as needed for Pain for up to 14 days. Max Daily Amount: 6 Tabs. (Patient taking differently: Take 1 Tab by mouth every four (4) hours. for pain) 30 Tab 0    amLODIPine (NORVASC) 2.5 mg tablet Take 1 Tab by mouth daily. (Patient taking differently: Take 1 Tab by mouth daily. one tablet) 30 Tab 11    HYDROcodone-acetaminophen (NORCO)  mg tablet Take 0.5-1 Tabs by mouth every four (4) hours as needed for Pain (chronic) for up to 30 days.  Indications: Pain 180 Tab 0        ALLERGIES    Allergies   Allergen Reactions    Lisinopril Other (comments)     ED          SOCIAL HISTORY    Social History Socioeconomic History    Marital status:      Spouse name: Not on file    Number of children: 3    Years of education: Not on file    Highest education level: Not on file   Occupational History    Occupation: [employer]   Social Needs    Financial resource strain: Not on file    Food insecurity:     Worry: Not on file     Inability: Not on file   iTagged needs:     Medical: Not on file     Non-medical: Not on file   Tobacco Use    Smoking status: Former Smoker     Types: Cigarettes    Smokeless tobacco: Never Used   Substance and Sexual Activity    Alcohol use:  Yes     Alcohol/week: 0.0 standard drinks     Comment: social    Drug use: No    Sexual activity: Not on file   Lifestyle    Physical activity:     Days per week: Not on file     Minutes per session: Not on file    Stress: Not on file   Relationships    Social connections:     Talks on phone: Not on file     Gets together: Not on file     Attends Yazidism service: Not on file     Active member of club or organization: Not on file     Attends meetings of clubs or organizations: Not on file     Relationship status: Not on file    Intimate partner violence:     Fear of current or ex partner: Not on file     Emotionally abused: Not on file     Physically abused: Not on file     Forced sexual activity: Not on file   Other Topics Concern    Not on file   Social History Narrative    Not on file       FAMILY HISTORY    Family History   Problem Relation Age of Onset    Colon Cancer Mother     Diabetes Mother     Cancer Father         H&N    Hypertension Father     Stroke Father          David Romano NP

## 2020-04-07 RX ORDER — TOPIRAMATE 25 MG/1
TABLET ORAL
Qty: 120 TAB | Refills: 1 | OUTPATIENT
Start: 2020-04-07

## 2020-05-05 RX ORDER — NEEDLES, DISPOSABLE 25GX5/8"
NEEDLE, DISPOSABLE MISCELLANEOUS
Qty: 25 EACH | Refills: 1 | Status: SHIPPED | OUTPATIENT
Start: 2020-05-05 | End: 2021-02-26 | Stop reason: SDUPTHER

## 2020-05-14 ENCOUNTER — TELEPHONE (OUTPATIENT)
Dept: INTERNAL MEDICINE CLINIC | Age: 65
End: 2020-05-14

## 2020-05-14 DIAGNOSIS — N52.9 ERECTILE DYSFUNCTION, UNSPECIFIED ERECTILE DYSFUNCTION TYPE: ICD-10-CM

## 2020-05-14 RX ORDER — VARDENAFIL HYDROCHLORIDE 20 MG/1
20 TABLET ORAL AS NEEDED
Qty: 30 TAB | Refills: 11 | Status: SHIPPED | OUTPATIENT
Start: 2020-05-14 | End: 2020-05-18 | Stop reason: SDUPTHER

## 2020-05-14 NOTE — TELEPHONE ENCOUNTER
Patient stating he lost the script that you gave him for Levitra last year. Stating he still had some left and was waiting before sending it to the [address] [place] [address] JRichi Wants to know if you will write him another script.

## 2020-05-18 ENCOUNTER — TELEPHONE (OUTPATIENT)
Dept: INTERNAL MEDICINE CLINIC | Age: 65
End: 2020-05-18

## 2020-05-18 DIAGNOSIS — N52.9 ERECTILE DYSFUNCTION, UNSPECIFIED ERECTILE DYSFUNCTION TYPE: ICD-10-CM

## 2020-05-18 RX ORDER — VARDENAFIL HYDROCHLORIDE 20 MG/1
20 TABLET ORAL AS NEEDED
Qty: 30 TAB | Refills: 11 | Status: SHIPPED | OUTPATIENT
Start: 2020-05-18 | End: 2022-07-29 | Stop reason: SDUPTHER

## 2020-05-18 NOTE — TELEPHONE ENCOUNTER
Please print out the Levitra prescription. He uses the [facility]. He will come by tomorrow and pick it up.

## 2020-06-01 ENCOUNTER — VIRTUAL VISIT (OUTPATIENT)
Dept: ORTHOPEDIC SURGERY | Age: 65
End: 2020-06-01

## 2020-06-01 DIAGNOSIS — Z98.1 S/P CERVICAL SPINAL FUSION: ICD-10-CM

## 2020-06-01 DIAGNOSIS — G95.9 CERVICAL MYELOPATHY (HCC): Primary | ICD-10-CM

## 2020-06-01 RX ORDER — TIZANIDINE 4 MG/1
4-8 TABLET ORAL
COMMUNITY
End: 2021-07-27

## 2020-06-01 NOTE — PATIENT INSTRUCTIONS
Neck Spasm: Exercises Introduction Here are some examples of exercises for you to try. The exercises may be suggested for a condition or for rehabilitation. Start each exercise slowly. Ease off the exercises if you start to have pain. You will be told when to start these exercises and which ones will work best for you. How to do the exercises Levator scapula stretch 1. Sit in a firm chair, or stand up straight. 2. Gently tilt your head toward your left shoulder. 3. Turn your head to look down into your armpit, bending your head slightly forward. Let the weight of your head stretch your neck muscles. 4. Hold for 15 to 30 seconds. 5. Return to your starting position. 6. Follow the same instructions above, but tilt your head toward your right shoulder. 7. Repeat 2 to 4 times toward each shoulder. Upper trapezius stretch 1. Sit in a firm chair, or stand up straight. 2. This stretch works best if you keep your shoulder down as you lean away from it. To help you remember to do this, start by relaxing your shoulders and lightly holding on to your thighs or your chair. 3. Tilt your head toward your shoulder and hold for 15 to 30 seconds. Let the weight of your head stretch your muscles. 4. If you would like a little added stretch, place your arm behind your back. Use the arm opposite of the direction you are tilting your head. For example, if you are tilting your head to the left, place your right arm behind your back. 5. Repeat 2 to 4 times toward each shoulder. Neck rotation 1. Sit in a firm chair, or stand up straight. 2. Keeping your chin level, turn your head to the right, and hold for 15 to 30 seconds. 3. Turn your head to the left, and hold for 15 to 30 seconds. 4. Repeat 2 to 4 times to each side. Chin tuck 1. Lie on the floor with a rolled-up towel under your neck. Your head should be touching the floor. 2. Slowly bring your chin toward the front of your neck. 3. Hold for a count of 6, and then relax for up to 10 seconds. 4. Repeat 8 to 12 times. Forward neck flexion 1. Sit in a firm chair, or stand up straight. 2. Bend your head forward. 3. Hold for 15 to 30 seconds, then return to your starting position. 4. Repeat 2 to 4 times. Follow-up care is a key part of your treatment and safety. Be sure to make and go to all appointments, and call your doctor if you are having problems. It's also a good idea to know your test results and keep a list of the medicines you take. Where can you learn more? Go to http://helder-janeth.info/ Enter P962 in the search box to learn more about \"Neck Spasm: Exercises. \" Current as of: March 2, 2020               Content Version: 12.5 © 3445-4770 [employer]. Care instructions adapted under license by [employer] (which disclaims liability or warranty for this information). If you have questions about a medical condition or this instruction, always ask your healthcare professional. [address] any warranty or liability for your use of this information.

## 2020-06-01 NOTE — PROGRESS NOTES
Zackery White presents today for No chief complaint on file. Is someone accompanying this pt? no    Is the patient using any DME equipment during [address]? no    Depression Screening:  3 most recent PHQ Screens 1/22/2020   Little interest or pleasure in doing things Not at all   Feeling down, depressed, irritable, or hopeless Not at all   Total Score PHQ 2 0       Learning Assessment:  Learning Assessment 11/13/2019   PRIMARY LEARNER Patient   HIGHEST LEVEL OF EDUCATION - PRIMARY LEARNER  SOME COLLEGE   BARRIERS PRIMARY LEARNER NONE   CO-LEARNER CAREGIVER No   PRIMARY LANGUAGE ENGLISH    NEED -   LEARNER PREFERENCE PRIMARY DEMONSTRATION   LEARNING SPECIAL TOPICS -   ANSWERED BY patient   RELATIONSHIP SELF   ASSESSMENT COMMENT -       Abuse Screening:  Abuse Screening Questionnaire 7/9/2019   Do you ever feel afraid of your partner? N   Are you in a relationship with someone who physically or mentally threatens you? N   Is it safe for you to go home? Y       Fall Risk  Fall Risk Assessment, last 12 mths 10/21/2019   Able to walk? Yes   Fall in past 12 months? No       Coordination of Care:  1. Have you been to the ER, urgent care clinic since your last visit? no  Hospitalized since your last visit? no    2. Have you seen or consulted any other health care providers outside of the [address] [place] [address] since your last visit? no Include any pap smears or colon screening.  none

## 2020-06-01 NOTE — PROGRESS NOTES
HPI  Alyson Lynn is a 71 y.o. male who was seen by synchronous (real-time) audio-video technology, patient's location HOME, provider's location [facility] with the patient's verbal consent-with the understanding that charges maybe billed for the visit. This visit was conducted using the doxy. me platform on 6/1/2020 for     Chief Complaint   Patient presents with    Neck Pain       Additional Participants during visit: none    Colon BALBIR Cordero is a 71 y.o.  male with history of neck pain and R shoulder pain and weakness who had an ACDF C3-6 on 12/5/2019 for myelopathy. He does have some R shoulder joint issues as well. At his 6 wk post-op he was doing well and his x-rays looked good. Today, he continues to improved w/ his neck and shoulder pain has improved and his weakness has also improved. He can now throw a ball and couldn't do that before the surgery. He is pleased with the outcome. He occasionally has some difficulty getting into a comfortable position at night with his pillows. He is doing his HEP with no issues. His swallowing issues resolved. Yovani Ra He is doing great. Pain is aching and radiation of pain and R shoulder. Symptoms are worst: evening. Pain is worse with lifting. Pain is better with relaxation.      Denies bladder/bowel dysfunction, saddle paresthesia, weakness, gait disturbance, or other neurological deficits.      Current Medications: On Temazepam and McCoy thru PM and PCP. No longer on Topamax    Assessment & Plan:   Diagnoses and all orders for this visit:    1. Cervical myelopathy ([address]    2. S/P cervical spinal fusion        This is a pt with cervical myelopathy 6mo out from his surgery doing well. We reviewed his exercises and correct pillow adjustment. > Pt was given information on cervical exercises   > Continue HEP  > Pt will f/o in Dec for in [address] and x-rays w/me  > Mr. Damien Cordero has a reminder for a \"due or due soon\" health maintenance.  I have asked that he contact his primary care provider, Gretel Corral MD, for follow-up on this health maintenance.  > We have informed patient to notify us for immediate appointment if he has any worsening neurogical symptoms or if an emergency situation presents, then call 911  >  has been reviewed and is appropriate        CPT Codes 11702-47151 for Established Patients may apply to this Telehealth Visit  Time-based coding, delete if not needed: I spent at least 15 minutes with this established patient, and >50% of the time was spent counseling and/or coordinating care regarding neck pain  Start Time: 0938  End Time: 0950    Subjective:   Abraham Gregg was seen for Neck Pain      Prior to Admission medications    Medication Sig Start Date End Date Taking? Authorizing Provider   tiZANidine (Zanaflex) 4 mg tablet Take 4-8 mg by mouth nightly as needed for Muscle Spasm(s). Yes Provider, Historical   vardenafiL (LEVITRA) 20 mg tablet Take 20 mg by mouth as needed (ED). 5/18/20  Yes Gretel Corral MD   Needle, Disp, 18 G (BD Regular Bevel Needles) 18 gauge x 1 1/2\" ndle USE TO DRAW UP TESTOSTERONE AND THEN CHANGE NEEDLE TO GIVE TESTOSTERONE 5/5/20  Yes Gretel Corral MD   testosterone cypionate (DEPO-TESTOSTERONE) 100 mg/mL injection use as directed for TESTOSTERONE INJECTIONS  Patient taking differently: 100 mg by IntraMUSCular route. Every 10 days 1/7/20  Yes Gretel Corral MD   hydroCHLOROthiazide (HYDRODIURIL) 25 mg tablet Take 1 Tab by mouth daily. 1/3/20  Yes Gretel Corral MD   atorvastatin (LIPITOR) 10 mg tablet take 1 tablet by mouth once daily 4/17/19  Yes Gretel Corral MD   Syringe, Disposable, 3 mL syrg Pt to use with Testosterone injections. 6/27/18  Yes Gretel Corral MD   BD REGULAR BEVEL NEEDLES 18 gauge x 1 1/2\" ndle USE TO DRAW UP TESTOSTERONE AND THEN CHANGE NEEDLE TO GIVE TESTOSTERONE 10/12/17  Yes Gretel Corral MD   losartan (COZAAR) 100 mg tablet Take 1 Tab by mouth daily.  1/3/20 Davey Valdez MD   cyclobenzaprine (FLEXERIL) 10 mg tablet Take 10 mg by mouth three (3) times daily as needed for Muscle Spasm(s). 10/16/19   Provider, Historical   HYDROcodone-acetaminophen (NORCO)  mg tablet Take 1 Tab by mouth every four to six (4-6) hours as needed for Pain for up to 14 days. Max Daily Amount: 6 Tabs. Patient taking differently: Take 1 Tab by mouth every four (4) hours. for pain 12/6/19 12/20/19  Kim Trinidad NP   amLODIPine (NORVASC) 2.5 mg tablet Take 1 Tab by mouth daily. Patient taking differently: Take 1 Tab by mouth daily. one tablet 11/13/19   Davey Valdez MD   naloxone 4 mg/actuation spry 4 mg by Nasal route once as needed (opioid overdose) for up to 1 dose. May substitute 2 mg syringe if insurance requires 6/30/17   Carol FidSILVIA magana   HYDROcodone-acetaminophen [facility])  mg tablet Take 0.5-1 Tabs by mouth every four (4) hours as needed for Pain (chronic) for up to 30 days.  Indications: Pain 7/13/17 12/2/19  Carol FidjakerSILVIA     Allergies   Allergen Reactions    Lisinopril Other (comments)     ED         Patient Active Problem List   Diagnosis Code    Dyslipidemia E78.5    S/P lumbar spinal fusion Z98.1    Hypogonadism male E29.1    Erectile dysfunction N52.9    IFG (impaired fasting glucose) R73.01    Primary hypertension I10    Chronic pain syndrome G89.4    Spondylosis of lumbar region without myelopathy or radiculopathy M47.816    Encounter for long-term (current) drug use Z79.899    Cervical spinal stenosis M48.02    S/P cervical spinal fusion Z98.1     Patient Active Problem List    Diagnosis Date Noted    S/P cervical spinal fusion 12/13/2019    Cervical spinal stenosis 11/13/2019    Spondylosis of lumbar region without myelopathy or radiculopathy 11/04/2015    Encounter for long-term (current) drug use 11/04/2015    Chronic pain syndrome 08/11/2015    Primary hypertension 05/07/2015    Hypogonadism male 11/10/2014    Erectile dysfunction 11/10/2014    IFG (impaired fasting glucose) 11/10/2014    S/P lumbar spinal fusion 10/30/2014    Dyslipidemia 09/13/2014     Current Outpatient Medications   Medication Sig Dispense Refill    tiZANidine (Zanaflex) 4 mg tablet Take 4-8 mg by mouth nightly as needed for Muscle Spasm(s).  vardenafiL (LEVITRA) 20 mg tablet Take 20 mg by mouth as needed (ED). 30 Tab 11    Needle, Disp, 18 G (BD Regular Bevel Needles) 18 gauge x 1 1/2\" ndle USE TO DRAW UP TESTOSTERONE AND THEN CHANGE NEEDLE TO GIVE TESTOSTERONE 25 Each 1    testosterone cypionate (DEPO-TESTOSTERONE) 100 mg/mL injection use as directed for TESTOSTERONE INJECTIONS (Patient taking differently: 100 mg by IntraMUSCular route. Every 10 days) 1 Vial 5    hydroCHLOROthiazide (HYDRODIURIL) 25 mg tablet Take 1 Tab by mouth daily. 90 Tab 3    atorvastatin (LIPITOR) 10 mg tablet take 1 tablet by mouth once daily 90 Tab 3    Syringe, Disposable, 3 mL syrg Pt to use with Testosterone injections. 25 Syringe 1    BD REGULAR BEVEL NEEDLES 18 gauge x 1 1/2\" ndle USE TO DRAW UP TESTOSTERONE AND THEN CHANGE NEEDLE TO GIVE TESTOSTERONE 25 Each 1    losartan (COZAAR) 100 mg tablet Take 1 Tab by mouth daily. 90 Tab 3    cyclobenzaprine (FLEXERIL) 10 mg tablet Take 10 mg by mouth three (3) times daily as needed for Muscle Spasm(s).  HYDROcodone-acetaminophen (NORCO)  mg tablet Take 1 Tab by mouth every four to six (4-6) hours as needed for Pain for up to 14 days. Max Daily Amount: 6 Tabs. (Patient taking differently: Take 1 Tab by mouth every four (4) hours. for pain) 30 Tab 0    amLODIPine (NORVASC) 2.5 mg tablet Take 1 Tab by mouth daily. (Patient taking differently: Take 1 Tab by mouth daily. one tablet) 30 Tab 11    naloxone 4 mg/actuation spry 4 mg by Nasal route once as needed (opioid overdose) for up to 1 dose.  May substitute 2 mg syringe if insurance requires 1 Package 0    HYDROcodone-acetaminophen (NORCO)  mg tablet Take 0.5-1 Tabs by mouth every four (4) hours as needed for Pain (chronic) for up to 30 days. Indications: Pain 180 Tab 0     Allergies   Allergen Reactions    Lisinopril Other (comments)     ED     Past Medical History:   Diagnosis Date    Arthritis     Chronic back pain     Dr. Marcie Beasley Dyslipidemia     Erectile dysfunction     Fatty liver     US 2002, serologies negative; fib-4 1.06 from 3/14    FHx: colon cancer     H/O bone scan 3/14    negative outside of djd    HTN (hypertension)     Hypogonadism male     negative pituitary MRI 2013    Internal hemorrhoids 11/15    Dr Naldo Atkins    Lower urinary tract symptoms (LUTS) 5/16    mild    Lumbar post-laminectomy syndrome     Lumbar spinal stenosis     MRI 1/14 and CT 3/14 w lumbar spinal stenosis severe    Overweight (BMI 25.0-29. 9)     IF 12/18 start weight 194 lbs    Prediabetes 2011    2 hr gtt 124 from 10/11    Rosacea     S/P lumbar spinal fusion     Thoracic or lumbosacral neuritis or radiculitis, unspecified      Past Surgical History:   Procedure Laterality Date    CHEST SURGERY PROCEDURE UNLISTED      resection of a RUL lung lesion for recurring polyps 1980s    HX CATARACT REMOVAL  09/2014    Dr Lucia Calle right     HX CERVICAL FUSION  12/2019    Dr Prosper Ortiz; ant decomp and fusion C3-6, structural allograftx3, ant cerv plate fixation S5-7 w K2M ant cervical locking plate and screws     HX COLONOSCOPY      negative Dr. Pardeep Stiles 2005, 2010; Dr Mata Leon 11/15 hemorrhoids    Henao Ben      Dr Dodson Page 10/17, L 2/18    Pedrito Lozano  2012    Dr. Adeel Peter right buttock    HX LUMBAR FUSION  2014    L3/4 for cauda equina, Dr. Annia Lorenzana  Constitutional: Negative for fever, chills, or weight change. Respiratory: Negative for cough or shortness of breath. Cardiovascular: Negative for chest pain or palpitations.   Gastrointestinal: Negative for incontinence, acid reflux, change in bowel habits, or constipation. Genitourinary: Negative for incontinence, dysuria and flank pain. Musculoskeletal: Positive for neck and R shoulder pain. See HPI. Skin: Negative for rash. Neurological:no  radiculopathy. See HPI. Endo/Heme/Allergies: Negative. Psychiatric/Behavioral: Negative. Objective:     General: alert, cooperative, no distress   Mental  status: mental status: alert, oriented to person, place, and time, normal mood, behavior, speech, dress, motor activity, and thought processes   Resp: resp: normal effort and no respiratory distress   Neuro: neuro: no gross deficits   Skin: skin: no discoloration or lesions of concern on visible areas   Good and full ROM of his R shoulder and neck w/ no pain  Due to this being a TeleHealth evaluation, many elements of the physical examination are unable to be assessed. We discussed the expected course, resolution and complications of the diagnosis(es) in detail. Medication risks, benefits, costs, interactions, and alternatives were discussed as indicated. I advised him to contact the office if his condition worsens, changes or fails to improve as anticipated. He expressed understanding with the diagnosis(es) and plan. Pursuant to the emergency declaration under the [facility] waiver authority and the [employer] Act, this Virtual  Visit was conducted, with patient's consent, to reduce the patient's risk of exposure to COVID-19 and provide continuity of care for an established patient. Services were provided through a video synchronous discussion virtually to substitute for in-person clinic visit.     Fani Ogden NP

## 2020-06-03 DIAGNOSIS — E29.1 HYPOGONADISM IN MALE: ICD-10-CM

## 2020-06-03 RX ORDER — TESTOSTERONE CYPIONATE 100 MG/ML
INJECTION, SOLUTION INTRAMUSCULAR
Qty: 1 VIAL | Refills: 5 | Status: SHIPPED | OUTPATIENT
Start: 2020-06-03 | End: 2020-12-10

## 2020-06-03 NOTE — TELEPHONE ENCOUNTER
[facility] is filling an rx for Testosterone from January. Stating the directions say to use as directed. They need a new rx to be sent in with more specific directions.

## 2020-06-18 RX ORDER — ATORVASTATIN CALCIUM 10 MG/1
TABLET, FILM COATED ORAL
Qty: 90 TAB | Refills: 3 | Status: SHIPPED | OUTPATIENT
Start: 2020-06-18 | End: 2021-07-06

## 2020-07-15 ENCOUNTER — APPOINTMENT (OUTPATIENT)
Dept: INTERNAL MEDICINE CLINIC | Age: 65
End: 2020-07-15

## 2020-07-15 ENCOUNTER — HOSPITAL ENCOUNTER (OUTPATIENT)
Dept: LAB | Age: 65
Discharge: HOME | End: 2020-07-15
Payer: COMMERCIAL

## 2020-07-15 DIAGNOSIS — E78.5 DYSLIPIDEMIA: ICD-10-CM

## 2020-07-15 DIAGNOSIS — I10 ESSENTIAL HYPERTENSION: ICD-10-CM

## 2020-07-15 DIAGNOSIS — R73.01 IFG (IMPAIRED FASTING GLUCOSE): ICD-10-CM

## 2020-07-15 DIAGNOSIS — Z12.5 SCREENING FOR MALIGNANT NEOPLASM OF PROSTATE: ICD-10-CM

## 2020-07-15 LAB
CHOLEST SERPL-MCNC: 136 MG/DL
ERYTHROCYTE [DISTWIDTH] IN BLOOD BY AUTOMATED COUNT: 13 % (ref 11.6–14.5)
HBA1C MFR BLD: 5.1 % (ref 4.2–5.6)
HCT VFR BLD AUTO: 47.2 % (ref 36–48)
HDLC SERPL-MCNC: 56 MG/DL (ref 40–60)
HDLC SERPL: 2.4 {RATIO} (ref 0–5)
HGB BLD-MCNC: 15.8 G/DL (ref 13–16)
LDLC SERPL CALC-MCNC: 58 MG/DL (ref 0–100)
LIPID PROFILE,FLP: NORMAL
MCH RBC QN AUTO: 31.8 PG (ref 24–34)
MCHC RBC AUTO-ENTMCNC: 33.5 G/DL (ref 31–37)
MCV RBC AUTO: 95 FL (ref 74–97)
PLATELET # BLD AUTO: 225 K/UL (ref 135–420)
PMV BLD AUTO: 11.9 FL (ref 9.2–11.8)
PSA SERPL-MCNC: 0.9 NG/ML (ref 0–4)
RBC # BLD AUTO: 4.97 M/UL (ref 4.7–5.5)
TRIGL SERPL-MCNC: 110 MG/DL (ref ?–150)
VLDLC SERPL CALC-MCNC: 22 MG/DL
WBC # BLD AUTO: 8.9 K/UL (ref 4.6–13.2)

## 2020-07-15 PROCEDURE — 80061 LIPID PANEL: CPT

## 2020-07-15 PROCEDURE — 36415 COLL VENOUS BLD VENIPUNCTURE: CPT

## 2020-07-15 PROCEDURE — 84153 ASSAY OF PSA TOTAL: CPT

## 2020-07-15 PROCEDURE — 83036 HEMOGLOBIN GLYCOSYLATED A1C: CPT

## 2020-07-15 PROCEDURE — 85027 COMPLETE CBC AUTOMATED: CPT

## 2020-07-22 ENCOUNTER — VIRTUAL VISIT (OUTPATIENT)
Dept: INTERNAL MEDICINE CLINIC | Age: 65
End: 2020-07-22

## 2020-07-22 DIAGNOSIS — E78.5 DYSLIPIDEMIA: ICD-10-CM

## 2020-07-22 DIAGNOSIS — M47.816 SPONDYLOSIS OF LUMBAR REGION WITHOUT MYELOPATHY OR RADICULOPATHY: ICD-10-CM

## 2020-07-22 DIAGNOSIS — R73.01 IFG (IMPAIRED FASTING GLUCOSE): Primary | ICD-10-CM

## 2020-07-22 DIAGNOSIS — E29.1 HYPOGONADISM MALE: ICD-10-CM

## 2020-07-22 DIAGNOSIS — I10 ESSENTIAL HYPERTENSION: ICD-10-CM

## 2020-07-22 DIAGNOSIS — G89.4 CHRONIC PAIN SYNDROME: ICD-10-CM

## 2020-07-22 NOTE — PROGRESS NOTES
Kellee Herbert is a 71 y.o. male who was seen by synchronous (real-time) audio-video technology on 7/22/2020 for No chief complaint on file. Assessment & Plan:   Diagnoses and all orders for this visit:    1. IFG (impaired fasting glucose)  -     METABOLIC PANEL, COMPREHENSIVE; Future  -     HEMOGLOBIN A1C W/O EAG; Future    2. Dyslipidemia    3. Primary hypertension    4. Hypogonadism male    5. Spondylosis of lumbar region without myelopathy or radiculopathy    6. Chronic pain syndrome        I spent at least 21 minutes on this visit with this established patient. 712  Subjective:       Objective:   No flowsheet data found. General: alert, cooperative, no distress   Mental  status: normal mood, behavior, speech, dress, motor activity, and thought processes, able to follow commands   HENT: NCAT   Neck: no visualized mass   Resp: no respiratory distress   Neuro: no gross deficits   Skin: no discoloration or lesions of concern on visible areas   Psychiatric: normal affect, consistent with stated mood, no evidence of hallucinations     Additional exam findings: We discussed the expected course, resolution and complications of the diagnosis(es) in detail. Medication risks, benefits, costs, interactions, and alternatives were discussed as indicated. I advised him to contact the office if his condition worsens, changes or fails to improve as anticipated. He expressed understanding with the diagnosis(es) and plan. Kellee Herbert, who was evaluated through a patient-initiated, synchronous (real-time) audio-video encounter, and/or his healthcare decision maker, is aware that it is a billable service, with coverage as determined by his insurance carrier. He provided verbal consent to proceed: Yes, and patient identification was verified.  It was conducted pursuant to the emergency declaration under the [facility] waiver authority and the Coronavirus Preparedness and Response Supplemental Appropriations Act. A caregiver was present when appropriate. Ability to conduct physical exam was limited. I was in the office. The patient was at home. Monica Altamirano MD    He continues to see pain mgmt and the meds continue to allow him to function with adls. Very happy with the c spine surgery results    No cardiovascular complaints. bp has been in good range of late. No set exercise but he's working and staying active    No GI or  complaints. Continues on testosterone replacement and no complaints there. No polyuria, polydipsia, nocturia, vision change, not checking sugars at this time. conitnues to do IF and weight is down to 165 on his scale    Vitals 1/22/2020   Weight 171 lb     Vitals 7/9/2019 12/10/2018   Weight 171 lb 194 lb     LAST MEDICARE WELLNESS EXAM: never as not medicare b    IF 12/18    Past Medical History:   Diagnosis Date    Arthritis     Chronic back pain     Dr. Alesia Garland Dyslipidemia     Erectile dysfunction     Fatty liver     US 2002, serologies negative; fib-4 1.06 from 3/14    FHx: colon cancer     H/O bone scan 3/14    negative outside of djd    Hypogonadism male     negative pituitary MRI 2013    IFG (impaired fasting glucose) 2011    2 hr gtt 124 from 10/11    Internal hemorrhoids 11/15    Dr Zulema Ward    Lower urinary tract symptoms (LUTS) 5/16    mild    Lumbar post-laminectomy syndrome     Lumbar spinal stenosis     MRI 1/14 and CT 3/14 w lumbar spinal stenosis severe    Overweight (BMI 25.0-29. 9)     IF 12/18 start weight 194 lbs    Primary hypertension     Rosacea     Thoracic or lumbosacral neuritis or radiculitis, unspecified      Past Surgical History:   Procedure Laterality Date    CHEST SURGERY PROCEDURE UNLISTED      resection of a RUL lung lesion for recurring polyps 1980s    HX CATARACT REMOVAL  09/2014    Dr Jeovany Norman  12/2019    Dr Grace Mann; ant decomp and fusion C3-6, structural allograftx3, ant cerv plate fixation Z7-0 w K2M ant cervical locking plate and screws     HX COLONOSCOPY      Dr. Luis Wilson 2005 neg; 2010 neg; Dr Monsivais Apo 11/15 hemorrhoids    Reginald Adela Pino 10/17, L 2/18    Shearon Steve  2012    Dr. Lani Diop right buttock    HX LUMBAR FUSION  2014    L3/4 for cauda equina, Dr. Valdez Course History     Socioeconomic History    Marital status:      Spouse name: Not on file    Number of children: 3    Years of education: Not on file    Highest education level: Not on file   Occupational History    Occupation: sup [employer]   Social Needs    Financial resource strain: Not on file    Food insecurity     Worry: Not on file     Inability: Not on file   [employer] needs     Medical: Not on file     Non-medical: Not on file   Tobacco Use    Smoking status: Former Smoker     Types: Cigarettes    Smokeless tobacco: Never Used   Substance and Sexual Activity    Alcohol use:  Yes     Alcohol/week: 0.0 standard drinks     Comment: social    Drug use: No    Sexual activity: Not on file   Lifestyle    Physical activity     Days per week: Not on file     Minutes per session: Not on file    Stress: Not on file   Relationships    Social connections     Talks on phone: Not on file     Gets together: Not on file     Attends [employer] service: Not on file     Active member of club or organization: Not on file     Attends meetings of clubs or organizations: Not on file     Relationship status: Not on file    Intimate partner violence     Fear of current or ex partner: Not on file     Emotionally abused: Not on file     Physically abused: Not on file     Forced sexual activity: Not on file   Other Topics Concern    Not on file   Social History Narrative    Not on file     Allergies   Allergen Reactions    Lisinopril Other (comments)     ED      Current Outpatient Medications   Medication Sig    atorvastatin (LIPITOR) 10 mg tablet take 1 tablet by mouth once daily    testosterone cypionate (DEPO-TESTOSTERONE) 100 mg/mL injection 1ml (100mcg) every 10 days    tiZANidine (Zanaflex) 4 mg tablet Take 4-8 mg by mouth nightly as needed for Muscle Spasm(s).  vardenafiL (LEVITRA) 20 mg tablet Take 20 mg by mouth as needed (ED).  Needle, Disp, 18 G (BD Regular Bevel Needles) 18 gauge x 1 1/2\" ndle USE TO DRAW UP TESTOSTERONE AND THEN CHANGE NEEDLE TO GIVE TESTOSTERONE    losartan (COZAAR) 100 mg tablet Take 1 Tab by mouth daily.  hydroCHLOROthiazide (HYDRODIURIL) 25 mg tablet Take 1 Tab by mouth daily.  cyclobenzaprine (FLEXERIL) 10 mg tablet Take 10 mg by mouth three (3) times daily as needed for Muscle Spasm(s).  HYDROcodone-acetaminophen (NORCO)  mg tablet Take 1 Tab by mouth every four to six (4-6) hours as needed for Pain for up to 14 days. Max Daily Amount: 6 Tabs. (Patient taking differently: Take 1 Tab by mouth every four (4) hours. for pain)    amLODIPine (NORVASC) 2.5 mg tablet Take 1 Tab by mouth daily. (Patient taking differently: Take 1 Tab by mouth daily. one tablet)    Syringe, Disposable, 3 mL syrg Pt to use with Testosterone injections.  BD REGULAR BEVEL NEEDLES 18 gauge x 1 1/2\" ndle USE TO DRAW UP TESTOSTERONE AND THEN CHANGE NEEDLE TO GIVE TESTOSTERONE    naloxone 4 mg/actuation spry 4 mg by Nasal route once as needed (opioid overdose) for up to 1 dose. May substitute 2 mg syringe if insurance requires    HYDROcodone-acetaminophen (NORCO)  mg tablet Take 0.5-1 Tabs by mouth every four (4) hours as needed for Pain (chronic) for up to 30 days. Indications: Pain     No current facility-administered medications for this visit.       REVIEW OF SYSTEMS: colo 11/15 Dr Cleveland Shin, sees Dr Gtz Course no vision change or eye pain  Oral  no mouth pain, tongue or tooth problems  Ears  no hearing loss, ear pain, fullness, no swallowing problems  Cardiac  no CP, PND, orthopnea, edema, palpitations or syncope  Chest  no breast masses  Resp  no wheezing, chronic coughing, dyspnea  GI  no heartburn, nausea, vomiting, change in bowel habits, bleeding, hemorrhoids  Urinary  no dysuria, hematuria, flank pain, urgency, frequency  Endo - no polyuria, polydipsia, nocturia, hot flashes    LABS  From 10/11 showed        hba1c 5.8, ldl-p 1008, chol 197, tg 200, hdl 45, ldl-c 112,                   psa 0.70, 2 hr   From 6/12  showed  gluc 104, cr 0.94, gfr 90,  alt 20, hba1c 5.7, ldl-p 2204, chol 197, tg 225, hdl 45, ldl-c 107,  tsh 0.62  From 11/12 showed gluc 100, cr 0.94, gfr 89                                ldl-p 1356                             test 316  From 5/13  showed  gluc 95,   cr 0.86                         hba1c 5.6, ldl-p 1183,  chol 147, tg 171, hdl 53, ldl-c 60,            test 313  From 5/13 showed                                test 196, lh 3.0, psa 0.60  From 11/13 showed        hba1c 5.5,     chol 164, tg 165, hdl 47, ldl-c 84  From 3/14 showed   gluc 91,   cr 0.83, gfr 96,  alt 42,      chol 151, tg 160, hdl 42, ldl-c 77,   wbc 7.7,   hb 15.2, plt 249,          psa 0.60, esr 2, spep neg, cea 0.9, crp 1.30, IL-6 1.2  From 5/14 showed   gluc 156, cr 1.02, gfr>60, alt 61,                 wbc 12.8, hb 14.6, plt 231, b12 722, fol>24(on pred)  From 10/14 showed gluc 101, cr 0.90, gfr 93,  alt 45, hba1c 5.7  From 5/15 showed        hba1c 5.7,     chol 155, tg 157, hdl 41, ldl-c 83,   wbc 6.6,   hb 14.6, plt 191, test 103,         psa 0.80  From 11/15 showed        hba1c 5.6,     chol 152, tg 159, hdl 49, ldl-c 81,          test 228  From 5/16 showed           chol 142, tg 118, hdl 38, ldl-c 80,   wbc 6.8,   hb 15.7, plt 203, test 395,         psa 0.9  From 11/16 showed gluc 103, cr 1.14, gfr>60, alt 73,                 test 346  From 5/17 showed           chol 139, tg 182, hdl 45, ldl-c 58,   wbc 7.2,   hb 15.7, plt 217,          psa 0.60, hep c neg  From 11/17 showed gluc 92,  cr 1.00l, gfr>60, alt 59, hba1c 5.4,                test 884  From 6/18 showed          chol 153, tg 163, hdl 44, ldl-c 76,   wbc 6.1,   hb 15.3, plt 206, test 382,         psa 0.70  From 12/18 showed gluc 108, cr 1.01, gfr>60, alt 55, hba1c 5.5,                test 466  From 7/19 showed   gluc 100, cr 0.98, gfr>60,       chol 156, tg 126, hdl 51, ldl-c 80,   wbc 6.7,   hb 15.2, plt 212, test 333,         psa 0.80  From 11/19 showed gluc 89,   cr 0.97, gfr>60,                  wbc 6.9,   hb 15.7, plt 248  From 1/20 showed   gluc 93,   cr 0.95, gfr>60    Results for orders placed or performed during the hospital encounter of 07/15/20   HEMOGLOBIN A1C W/O EAG   Result Value Ref Range    Hemoglobin A1c 5.1 4.2 - 5.6 %   LIPID PANEL   Result Value Ref Range    LIPID PROFILE          Cholesterol, total 136 <200 MG/DL    Triglyceride 110 <150 MG/DL    HDL Cholesterol 56 40 - 60 MG/DL    LDL, calculated 58 0 - 100 MG/DL    VLDL, calculated 22 MG/DL    CHOL/HDL Ratio 2.4 0 - 5.0     CBC W/O DIFF   Result Value Ref Range    WBC 8.9 4.6 - 13.2 K/uL    RBC 4.97 4.70 - 5.50 M/uL    HGB 15.8 13.0 - 16.0 g/dL    HCT 47.2 36.0 - 48.0 %    MCV 95.0 74.0 - 97.0 FL    MCH 31.8 24.0 - 34.0 PG    MCHC 33.5 31.0 - 37.0 g/dL    RDW 13.0 11.6 - 14.5 %    PLATELET 010 513 - 522 K/uL    MPV 11.9 (H) 9.2 - 11.8 FL   PSA, DIAGNOSTIC (PROSTATE SPECIFIC AG)   Result Value Ref Range    Prostate Specific Ag 0.9 0.0 - 4.0 ng/mL     Patient Active Problem List   Diagnosis Code    Dyslipidemia E78.5    S/P lumbar spinal fusion Z98.1    Hypogonadism male E29.1    Erectile dysfunction N52.9    IFG (impaired fasting glucose) R73.01    Primary hypertension I10    Chronic pain syndrome G89.4    Spondylosis of lumbar region without myelopathy or radiculopathy M47.816    Encounter for long-term (current) drug use Z79.899    Cervical spinal stenosis M48.02    S/P cervical spinal fusion Z98.1     ASSESSMENT AND PLAN:  1. Hypertension. Continue current regimen. 2.  Fatty liver. Wt loss, exercise, abstinence reiterated  3. Dyslipidemia. Continue current regimen. 4.  FH colon ca. F/U colo 2020  5. IFG. Resolved with wt loss  6. ED. Prn levitra  7. Hypogonadism. Continue current regimen. 8.  Chronic pain. F/U pain clinic  9. Overweight. Congratulated him on the continued wt loss  10. Spine. Doing well        RTC 1/21    Above conditions discussed at length and patient vocalized understanding.   All questions answered to patient satisfaction

## 2020-08-19 NOTE — PROGRESS NOTES
71 y.o. WHITE OR  male who presents for med clearance    He will be undergoing left cataract surgery by Dr Kerri Cheema    He continues to see pain mgmt and the meds continue to allow him to function with adls. Remains happy with the outcome of the c spine surgery r    No chest pains, sob, pnd, orthopnea, edema, bp has been in the 120s over 70s when he checks of late. No set exercise but staying active    No GI or  complaints. Continues on testosterone replacement and no complaints there. No polyuria, polydipsia, nocturia, vision change, not checking sugars at this time. conitnues to do IF at 8 hr window and weight is stable    Vitals 8/25/2020 3/2/2020 1/22/2020 1/21/2020 12/19/2019   Weight 173 lb 175 lb 171 lb 165 lb 168 lb     LAST MEDICARE WELLNESS EXAM: never as not medicare b    IF 12/18    Past Medical History:   Diagnosis Date    Arthritis     Chronic back pain     Dr. Vaughn Lai Dyslipidemia     Erectile dysfunction     Fatty liver     US 2002, serologies negative; fib-4 1.06 from 3/14    FHx: colon cancer     H/O bone scan 3/14    negative outside of djd    Hypogonadism male     negative pituitary MRI 2013    IFG (impaired fasting glucose) 2011    2 hr gtt 124 from 10/11    Internal hemorrhoids 11/15    Dr Alejandro Pinzon    Lower urinary tract symptoms (LUTS) 5/16    mild    Lumbar post-laminectomy syndrome     Lumbar spinal stenosis     MRI 1/14 and CT 3/14 w lumbar spinal stenosis severe    Overweight (BMI 25.0-29. 9)     IF 12/18 start weight 194 lbs    Primary hypertension     Rosacea     Thoracic or lumbosacral neuritis or radiculitis, unspecified      Past Surgical History:   Procedure Laterality Date    CHEST SURGERY PROCEDURE UNLISTED      resection of a RUL lung lesion for recurring polyps 1980s    HX CATARACT REMOVAL  09/2014    Dr Mackenzie Herrera  12/2019    Dr Sheila Hale; ant decomp and fusion C3-6, structural allograftx3, ant cerv plate fixation C3-6 w K2M ant cervical locking plate and screws     HX COLONOSCOPY      Dr. Low Langley 2005 neg; 2010 neg; Dr Remy Avila 11/15 hemorrhoids    Hulosebastian Henson 10/17, L 2/18    Mikey Camara  2012    Dr. Joey Navarrete right buttock    HX LUMBAR FUSION  2014    L3/4 for cauda equina, Dr. Diana Lewiste History     Socioeconomic History    Marital status:      Spouse name: Not on file    Number of children: 3    Years of education: Not on file    Highest education level: Not on file   Occupational History    Occupation: [employer]   Social Needs    Financial resource strain: Not on file    Food insecurity     Worry: Not on file     Inability: Not on file   [employer] needs     Medical: Not on file     Non-medical: Not on file   Tobacco Use    Smoking status: Former Smoker     Types: Cigarettes    Smokeless tobacco: Never Used   Substance and Sexual Activity    Alcohol use:  Yes     Alcohol/week: 0.0 standard drinks     Comment: social    Drug use: No    Sexual activity: Not on file   Lifestyle    Physical activity     Days per week: Not on file     Minutes per session: Not on file    Stress: Not on file   Relationships    Social connections     Talks on phone: Not on file     Gets together: Not on file     Attends Gnosticist service: Not on file     Active member of club or organization: Not on file     Attends meetings of clubs or organizations: Not on file     Relationship status: Not on file    Intimate partner violence     Fear of current or ex partner: Not on file     Emotionally abused: Not on file     Physically abused: Not on file     Forced sexual activity: Not on file   Other Topics Concern    Not on file   Social History Narrative    Not on file     Allergies   Allergen Reactions    Lisinopril Other (comments)     ED      Current Outpatient Medications   Medication Sig    atorvastatin (LIPITOR) 10 mg tablet take 1 tablet by mouth once daily  testosterone cypionate (DEPO-TESTOSTERONE) 100 mg/mL injection 1ml (100mcg) every 10 days    tiZANidine (Zanaflex) 4 mg tablet Take 4-8 mg by mouth nightly as needed for Muscle Spasm(s).  vardenafiL (LEVITRA) 20 mg tablet Take 20 mg by mouth as needed (ED).  Needle, Disp, 18 G (BD Regular Bevel Needles) 18 gauge x 1 1/2\" ndle USE TO DRAW UP TESTOSTERONE AND THEN CHANGE NEEDLE TO GIVE TESTOSTERONE    losartan (COZAAR) 100 mg tablet Take 1 Tab by mouth daily.  hydroCHLOROthiazide (HYDRODIURIL) 25 mg tablet Take 1 Tab by mouth daily.  cyclobenzaprine (FLEXERIL) 10 mg tablet Take 10 mg by mouth three (3) times daily as needed for Muscle Spasm(s).  HYDROcodone-acetaminophen (NORCO)  mg tablet Take 1 Tab by mouth every four to six (4-6) hours as needed for Pain for up to 14 days. Max Daily Amount: 6 Tabs. (Patient taking differently: Take 1 Tab by mouth every four (4) hours. for pain)    amLODIPine (NORVASC) 2.5 mg tablet Take 1 Tab by mouth daily. (Patient taking differently: Take 1 Tab by mouth daily. one tablet)    Syringe, Disposable, 3 mL syrg Pt to use with Testosterone injections.  BD REGULAR BEVEL NEEDLES 18 gauge x 1 1/2\" ndle USE TO DRAW UP TESTOSTERONE AND THEN CHANGE NEEDLE TO GIVE TESTOSTERONE    naloxone 4 mg/actuation spry 4 mg by Nasal route once as needed (opioid overdose) for up to 1 dose. May substitute 2 mg syringe if insurance requires    HYDROcodone-acetaminophen (NORCO)  mg tablet Take 0.5-1 Tabs by mouth every four (4) hours as needed for Pain (chronic) for up to 30 days. Indications: Pain     No current facility-administered medications for this visit.       REVIEW OF SYSTEMS: colo 11/15 Dr Jae Landa, sees Dr Ankur Manley  Ophtho - no vision change or eye pain  Oral - no mouth pain, tongue or tooth problems  Ears - no hearing loss, ear pain, fullness, no swallowing problems  Cardiac - no CP, PND, orthopnea, edema, palpitations or syncope  Chest - no breast masses  Resp - no wheezing, chronic coughing, dyspnea  GI - no heartburn, nausea, vomiting, change in bowel habits, bleeding, hemorrhoids  Urinary - no dysuria, hematuria, flank pain, urgency, frequency  Endo - no polyuria, polydipsia, nocturia, hot flashes    LABS  From 10/11 showed        hba1c 5.8, ldl-p 1008, chol 197, tg 200, hdl 45, ldl-c 112,                   psa 0.70, 2 hr   From 6/12  showed  gluc 104, cr 0.94, gfr 90,  alt 20, hba1c 5.7, ldl-p 2204, chol 197, tg 225, hdl 45, ldl-c 107,  tsh 0.62  From 11/12 showed gluc 100, cr 0.94, gfr 89                                ldl-p 1356                             test 316  From 5/13  showed  gluc 95,   cr 0.86                         hba1c 5.6, ldl-p 1183,  chol 147, tg 171, hdl 53, ldl-c 60,            test 313  From 5/13 showed                                test 196, lh 3.0, psa 0.60  From 11/13 showed        hba1c 5.5,     chol 164, tg 165, hdl 47, ldl-c 84  From 3/14 showed   gluc 91,   cr 0.83, gfr 96,  alt 42,      chol 151, tg 160, hdl 42, ldl-c 77,   wbc 7.7,   hb 15.2, plt 249,          psa 0.60, esr 2, spep neg, cea 0.9, crp 1.30, IL-6 1.2  From 5/14 showed   gluc 156, cr 1.02, gfr>60, alt 61,                 wbc 12.8, hb 14.6, plt 231, b12 722, fol>24(on pred)  From 10/14 showed gluc 101, cr 0.90, gfr 93,  alt 45, hba1c 5.7  From 5/15 showed        hba1c 5.7,     chol 155, tg 157, hdl 41, ldl-c 83,   wbc 6.6,   hb 14.6, plt 191, test 103,         psa 0.80  From 11/15 showed        hba1c 5.6,     chol 152, tg 159, hdl 49, ldl-c 81,          test 228  From 5/16 showed           chol 142, tg 118, hdl 38, ldl-c 80,   wbc 6.8,   hb 15.7, plt 203, test 395,         psa 0.9  From 11/16 showed gluc 103, cr 1.14, gfr>60, alt 73,                 test 346  From 5/17 showed           chol 139, tg 182, hdl 45, ldl-c 58,   wbc 7.2,   hb 15.7, plt 217,          psa 0.60, hep c neg  From 11/17 showed gluc 92,  cr 1.00l, gfr>60, alt 59, hba1c 5.4, test 884  From 6/18 showed          chol 153, tg 163, hdl 44, ldl-c 76,   wbc 6.1,   hb 15.3, plt 206, test 382,         psa 0.70  From 12/18 showed gluc 108, cr 1.01, gfr>60, alt 55, hba1c 5.5,                test 466  From 7/19 showed   gluc 100, cr 0.98, gfr>60,       chol 156, tg 126, hdl 51, ldl-c 80,   wbc 6.7,   hb 15.2, plt 212, test 333,         psa 0.80  From 11/19 showed gluc 89,   cr 0.97, gfr>60,                  wbc 6.9,   hb 15.7, plt 248  From 1/20 showed   gluc 93,   cr 0.95, gfr>60  From 7/20 showed        hba1c 5.1,     chol 136, tg 110, hdl 56, ldl-c 58,   wbc 8.9,   hb 15.9, plt 225,          psa 0.90    Patient Active Problem List   Diagnosis Code    Dyslipidemia E78.5    S/P lumbar spinal fusion Z98.1    Hypogonadism male E29.1    Erectile dysfunction N52.9    IFG (impaired fasting glucose) R73.01    Primary hypertension I10    Chronic pain syndrome G89.4    Spondylosis of lumbar region without myelopathy or radiculopathy M47.816    Encounter for long-term (current) drug use Z79.899    Cervical spinal stenosis M48.02    S/P cervical spinal fusion Z98.1     ASSESSMENT AND PLAN:  1. Hypertension. Continue current regimen. 2.  Fatty liver. Wt loss, exercise, abstinence reiterated  3. Dyslipidemia. Continue current regimen. 4.  FH colon ca. F/U colo 2020  5. IFG. Resolved with wt loss  6. ED. Prn levitra  7. Hypogonadism. Continue current regimen. 8.  Chronic pain. F/U pain clinic  9. Overweight. Congratulated him on the continued wt loss    NEW ISSUES  10.  Ophth. He is felt to be average risk for the planned procedure, no contraindications noted. Routine postop prophylaxis per protocol. If needed, cover with correction insulin per hospital protocol            RTC 1/21    Above conditions discussed at length and patient vocalized understanding.   All questions answered to patient satisfaction

## 2020-08-25 ENCOUNTER — OFFICE VISIT (OUTPATIENT)
Dept: INTERNAL MEDICINE CLINIC | Age: 65
End: 2020-08-25

## 2020-08-25 VITALS
WEIGHT: 173 LBS | BODY MASS INDEX: 24.22 KG/M2 | HEIGHT: 71 IN | HEART RATE: 63 BPM | SYSTOLIC BLOOD PRESSURE: 148 MMHG | TEMPERATURE: 96.8 F | RESPIRATION RATE: 14 BRPM | DIASTOLIC BLOOD PRESSURE: 77 MMHG | OXYGEN SATURATION: 98 %

## 2020-08-25 DIAGNOSIS — H26.9 CATARACT OF LEFT EYE, UNSPECIFIED CATARACT TYPE: ICD-10-CM

## 2020-08-25 DIAGNOSIS — Z01.818 PREOP EXAM FOR INTERNAL MEDICINE: Primary | ICD-10-CM

## 2020-08-25 DIAGNOSIS — I10 ESSENTIAL HYPERTENSION: ICD-10-CM

## 2020-12-07 DIAGNOSIS — E29.1 HYPOGONADISM IN MALE: ICD-10-CM

## 2020-12-10 RX ORDER — TESTOSTERONE CYPIONATE 100 MG/ML
INJECTION, SOLUTION INTRAMUSCULAR
Qty: 10 ML | Refills: 5 | Status: SHIPPED | OUTPATIENT
Start: 2020-12-10 | End: 2021-06-11

## 2020-12-11 ENCOUNTER — OFFICE VISIT (OUTPATIENT)
Dept: ORTHOPEDIC SURGERY | Age: 65
End: 2020-12-11
Payer: COMMERCIAL

## 2020-12-11 VITALS
HEART RATE: 76 BPM | OXYGEN SATURATION: 98 % | SYSTOLIC BLOOD PRESSURE: 154 MMHG | TEMPERATURE: 97.7 F | RESPIRATION RATE: 13 BRPM | BODY MASS INDEX: 24.92 KG/M2 | WEIGHT: 178 LBS | DIASTOLIC BLOOD PRESSURE: 89 MMHG | HEIGHT: 71 IN

## 2020-12-11 DIAGNOSIS — G95.9 CERVICAL MYELOPATHY (HCC): Primary | ICD-10-CM

## 2020-12-11 DIAGNOSIS — Z98.1 S/P CERVICAL SPINAL FUSION: ICD-10-CM

## 2020-12-11 PROCEDURE — 72040 X-RAY EXAM NECK SPINE 2-3 VW: CPT | Performed by: NURSE PRACTITIONER

## 2020-12-11 PROCEDURE — 99213 OFFICE O/P EST LOW 20 MIN: CPT | Performed by: NURSE PRACTITIONER

## 2020-12-11 NOTE — PATIENT INSTRUCTIONS
High Blood Pressure: Care Instructions  Overview     It's normal for blood pressure to go up and down throughout the day. But if it stays up, you have high blood pressure. Another name for high blood pressure is hypertension. Despite what a lot of people think, high blood pressure usually doesn't cause headaches or make you feel dizzy or lightheaded. It usually has no symptoms. But it does increase your risk of stroke, heart attack, and other problems. You and your doctor will talk about your risks of these problems based on your blood pressure. Your doctor will give you a goal for your blood pressure. Your goal will be based on your health and your age. Lifestyle changes, such as eating healthy and being active, are always important to help lower blood pressure. You might also take medicine to reach your blood pressure goal.  Follow-up care is a key part of your treatment and safety. Be sure to make and go to all appointments, and call your doctor if you are having problems. It's also a good idea to know your test results and keep a list of the medicines you take. How can you care for yourself at home? Medical treatment  · If you stop taking your medicine, your blood pressure will go back up. You may take one or more types of medicine to lower your blood pressure. Be safe with medicines. Take your medicine exactly as prescribed. Call your doctor if you think you are having a problem with your medicine. · Talk to your doctor before you start taking aspirin every day. Aspirin can help certain people lower their risk of a heart attack or stroke. But taking aspirin isn't right for everyone, because it can cause serious bleeding. · See your doctor regularly. You may need to see the doctor more often at first or until your blood pressure comes down. · If you are taking blood pressure medicine, talk to your doctor before you take decongestants or anti-inflammatory medicine, such as ibuprofen.  Some of these medicines can raise blood pressure. · Learn how to check your blood pressure at home. Lifestyle changes  · Stay at a healthy weight. This is especially important if you put on weight around the waist. Losing even 10 pounds can help you lower your blood pressure. · If your doctor recommends it, get more exercise. Walking is a good choice. Bit by bit, increase the amount you walk every day. Try for at least 30 minutes on most days of the week. You also may want to swim, bike, or do other activities. · Avoid or limit alcohol. Talk to your doctor about whether you can drink any alcohol. · Try to limit how much sodium you eat to less than 2,300 milligrams (mg) a day. Your doctor may ask you to try to eat less than 1,500 mg a day. · Eat plenty of fruits (such as bananas and oranges), vegetables, legumes, whole grains, and low-fat dairy products. · Lower the amount of saturated fat in your diet. Saturated fat is found in animal products such as milk, cheese, and meat. Limiting these foods may help you lose weight and also lower your risk for heart disease. · Do not smoke. Smoking increases your risk for heart attack and stroke. If you need help quitting, talk to your doctor about stop-smoking programs and medicines. These can increase your chances of quitting for good. When should you call for help? Call  911 anytime you think you may need emergency care. This may mean having symptoms that suggest that your blood pressure is causing a serious heart or blood vessel problem. Your blood pressure may be over 180/120. For example, call 911 if:    · You have symptoms of a heart attack. These may include:  ? Chest pain or pressure, or a strange feeling in the chest.  ? Sweating. ? Shortness of breath. ? Nausea or vomiting. ? Pain, pressure, or a strange feeling in the back, neck, jaw, or upper belly or in one or both shoulders or arms. ? Lightheadedness or sudden weakness.   ? A fast or irregular heartbeat.     · You have symptoms of a stroke. These may include:  ? Sudden numbness, tingling, weakness, or loss of movement in your face, arm, or leg, especially on only one side of your body. ? Sudden vision changes. ? Sudden trouble speaking. ? Sudden confusion or trouble understanding simple statements. ? Sudden problems with walking or balance. ? A sudden, severe headache that is different from past headaches.     · You have severe back or belly pain. Do not wait until your blood pressure comes down on its own. Get help right away. Call your doctor now or seek immediate care if:    · Your blood pressure is much higher than normal (such as 180/120 or higher), but you don't have symptoms.     · You think high blood pressure is causing symptoms, such as:  ? Severe headache.  ? Blurry vision. Watch closely for changes in your health, and be sure to contact your doctor if:    · Your blood pressure measures higher than your doctor recommends at least 2 times. That means the top number is higher or the bottom number is higher, or both.     · You think you may be having side effects from your blood pressure medicine. Where can you learn more? Go to http://www.gray.com/  Enter [id number] in the search box to learn more about \"High Blood Pressure: Care Instructions. \"  Current as of: December 16, 2019               Content Version: 12.6  © 5643-8872 [employer]. Care instructions adapted under license by [employer] (which disclaims liability or warranty for this information). If you have questions about a medical condition or this instruction, always ask your healthcare professional. [place] [address] any warranty or liability for your use of this information. Neck Arthritis: Exercises  Introduction  Here are some examples of exercises for you to try. The exercises may be suggested for a condition or for rehabilitation. Start each exercise slowly.  Ease off the exercises if you start to have pain. You will be told when to start these exercises and which ones will work best for you. How to do the exercises  Neck stretches to the side   1. This stretch works best if you keep your shoulder down as you lean away from it. To help you remember to do this, start by relaxing your shoulders and lightly holding on to your thighs or your chair. 2. Tilt your head toward your shoulder and hold for 15 to 30 seconds. Let the weight of your head stretch your muscles. 3. Repeat 2 to 4 times toward each shoulder. Chin tuck   1. Lie on the floor with a rolled-up towel under your neck. Your head should be touching the floor. 2. Slowly bring your chin toward your chest.  3. Hold for a count of 6, and then relax for up to 10 seconds. 4. Repeat 8 to 12 times. Active cervical rotation   1. Sit in a firm chair, or stand up straight. 2. Keeping your chin level, turn your head to the right, and hold for 15 to 30 seconds. 3. Turn your head to the left and hold for 15 to 30 seconds. 4. Repeat 2 to 4 times to each side. Shoulder blade squeeze   1. While standing, squeeze your shoulder blades together. 2. Do not raise your shoulders up as you are squeezing. 3. Hold for 6 seconds. 4. Repeat 8 to 12 times. Shoulder rolls   1. Sit comfortably with your feet shoulder-width apart. You can also do this exercise standing up. 2. Roll your shoulders up, then back, and then down in a smooth, circular motion. 3. Repeat 2 to 4 times. Follow-up care is a key part of your treatment and safety. Be sure to make and go to all appointments, and call your doctor if you are having problems. It's also a good idea to know your test results and keep a list of the medicines you take. Where can you learn more? Go to http://www.gray.com/  Enter I632 in the search box to learn more about \"Neck Arthritis: Exercises. \"  Current as of: March 2, 2020               Content Version: 12.6  © 6552-5929 [employer]. Care instructions adapted under license by [employer] (which disclaims liability or warranty for this information). If you have questions about a medical condition or this instruction, always ask your healthcare professional. [address] any warranty or liability for your use of this information. Neck Spasm: Exercises  Introduction  Here are some examples of exercises for you to try. The exercises may be suggested for a condition or for rehabilitation. Start each exercise slowly. Ease off the exercises if you start to have pain. You will be told when to start these exercises and which ones will work best for you. How to do the exercises  Levator scapula stretch   1. Sit in a firm chair, or stand up straight. 2. Gently tilt your head toward your left shoulder. 3. Turn your head to look down into your armpit, bending your head slightly forward. Let the weight of your head stretch your neck muscles. 4. Hold for 15 to 30 seconds. 5. Return to your starting position. 6. Follow the same instructions above, but tilt your head toward your right shoulder. 7. Repeat 2 to 4 times toward each shoulder. Upper trapezius stretch   1. Sit in a firm chair, or stand up straight. 2. This stretch works best if you keep your shoulder down as you lean away from it. To help you remember to do this, start by relaxing your shoulders and lightly holding on to your thighs or your chair. 3. Tilt your head toward your shoulder and hold for 15 to 30 seconds. Let the weight of your head stretch your muscles. 4. If you would like a little added stretch, place your arm behind your back. Use the arm opposite of the direction you are tilting your head. For example, if you are tilting your head to the left, place your right arm behind your back. 5. Repeat 2 to 4 times toward each shoulder. Neck rotation   1.  Sit in a firm chair, or stand up straight. 2. Keeping your chin level, turn your head to the right, and hold for 15 to 30 seconds. 3. Turn your head to the left, and hold for 15 to 30 seconds. 4. Repeat 2 to 4 times to each side. Chin tuck   1. Lie on the floor with a rolled-up towel under your neck. Your head should be touching the floor. 2. Slowly bring your chin toward the front of your neck. 3. Hold for a count of 6, and then relax for up to 10 seconds. 4. Repeat 8 to 12 times. Forward neck flexion   1. Sit in a firm chair, or stand up straight. 2. Bend your head forward. 3. Hold for 15 to 30 seconds, then return to your starting position. 4. Repeat 2 to 4 times. Follow-up care is a key part of your treatment and safety. Be sure to make and go to all appointments, and call your doctor if you are having problems. It's also a good idea to know your test results and keep a list of the medicines you take. Where can you learn more? Go to http://www.gray.com/  Enter P962 in the search box to learn more about \"Neck Spasm: Exercises. \"  Current as of: March 2, 2020               Content Version: 12.6  © 8634-2043 [employer]. Care instructions adapted under license by [employer] (which disclaims liability or warranty for this information). If you have questions about a medical condition or this instruction, always ask your healthcare professional. [place] [address] any warranty or liability for your use of this information. Neck: Exercises  Introduction  Here are some examples of exercises for you to try. The exercises may be suggested for a condition or for rehabilitation. Start each exercise slowly. Ease off the exercises if you start to have pain. You will be told when to start these exercises and which ones will work best for you. How to do the exercises  Neck stretch   1.  This stretch works best if you keep your shoulder down as you lean away from it. To help you remember to do this, start by relaxing your shoulders and lightly holding on to your thighs or your chair. 2. Tilt your head toward your shoulder and hold for 15 to 30 seconds. Let the weight of your head stretch your muscles. 3. If you would like a little added stretch, use your hand to gently and steadily pull your head toward your shoulder. For example, keeping your right shoulder down, lean your head to the left. 4. Repeat 2 to 4 times toward each shoulder. Diagonal neck stretch   1. Turn your head slightly toward the direction you will be stretching, and tilt your head diagonally toward your chest and hold for 15 to 30 seconds. 2. If you would like a little added stretch, use your hand to gently and steadily pull your head forward on the diagonal.  3. Repeat 2 to 4 times toward each side. Dorsal glide stretch   The dorsal glide stretches the back of the neck. If you feel pain, do not glide so far back. Some people find this exercise easier to do while lying on their backs with an ice pack on the neck. 1. Sit or stand tall and look straight ahead. 2. Slowly tuck your chin as you glide your head backward over your body  3. Hold for a count of 6, and then relax for up to 10 seconds. 4. Repeat 8 to 12 times. Chest and shoulder stretch   1. Sit or stand tall and glide your head backward as in the dorsal glide stretch. 2. Raise both arms so that your hands are next to your ears. 3. Take a deep breath, and as you breathe out, lower your elbows down and behind your back. You will feel your shoulder blades slide down and together, and at the same time you will feel a stretch across your chest and the front of your shoulders. 4. Hold for about 6 seconds, and then relax for up to 10 seconds. 5. Repeat 8 to 12 times. Strengthening: Hands on head   1.  Move your head backward, forward, and side to side against gentle pressure from your hands, holding each position for about 6 seconds. 2. Repeat 8 to 12 times. Follow-up care is a key part of your treatment and safety. Be sure to make and go to all appointments, and call your doctor if you are having problems. It's also a good idea to know your test results and keep a list of the medicines you take. Where can you learn more? Go to http://www.aj.com/  Enter P975 in the search box to learn more about \"Neck: Exercises. \"  Current as of: March 2, 2020               Content Version: 12.6  © 2206-8957 [employer]. Care instructions adapted under license by [employer] (which disclaims liability or warranty for this information). If you have questions about a medical condition or this instruction, always ask your healthcare professional. [address] any warranty or liability for your use of this information.

## 2020-12-11 NOTE — PROGRESS NOTES
[facility], [place] [address].  [address], [address]  [facility], [address]  Phone: [phone]  Fax: [phone]    Osmin Mejia  : 1955  PCP: Holly Cates MD    PROGRESS NOTE    HISTORY OF PRESENT ILLNESS:  Chief Complaint   Patient presents with    Neck Pain    Arm Pain     left     Colon BALBIR Bhagat is a 76 y.o.  male with history of neck pain and R shoulder pain and weakness who had an ACDF C3-6 on 2019 for myelopathy. He does have some R shoulder joint issues as well.  At his 6 wk post-op he was doing well and his x-rays looked good. He last saw NP Sweede virtually. Today, he states he is doing ok. He is having some left shoulder discomfort at times. He states he gets nerve pains when laying down but this seems to be improving. Overall, he is doing well. He is in PM.  Denies bladder/bowel dysfunction, saddle paresthesia, weakness, gait disturbance, or other neurological deficit. Pt at this time desires to  continue with current care/proceed with medication evaluation/proceed with post op care. Current Medications: On Temazepam and Norco thru PM and PCP. No longer on Topamax    ASSESSMENT  76 y.o. male with neck and shoulder discomfort. Diagnoses and all orders for this visit:    1. Cervical myelopathy (HCC)  -     AMB POC XRAY, SPINE, CERVICAL; 2 OR 3    2. S/P cervical spinal fusion  -     AMB POC XRAY, SPINE, CERVICAL; 2 OR 3         IMPRESSION/PLAN    1) Pt was given information on neck exercises. Focus on exercises  2)  Can talk with PM about Topamax, lyrica, gabapentin- he declines this today   3) 2 view cervical today, fusion intact. 4) Mr. Stefano Bhagat has a reminder for a \"due or due soon\" health maintenance. I have asked that he contact his primary care provider, Holly Cates MD, for follow-up on this health maintenance.   5) We have informed patient to notify us for immediate appointment if he has any worsening neurogical symptoms or if an emergency situation presents, then call 911  5) Pt will follow-up in PRN. Risks and benefits of ongoing therapy have been reviewed with the patient.  is appropriate. No pain behaviors. Denies thoughts of harming self or others. Pt has a good risk to benefit ratio which allows the pt to function in a home environment without side effects. PAST MEDICAL HISTORY  Past Medical History:   Diagnosis Date    Arthritis     Chronic back pain     Dr. Spencer Odell Dyslipidemia     Erectile dysfunction     Fatty liver     US 2002, serologies negative; fib-4 1.06 from 3/14    FHx: colon cancer     H/O bone scan 3/14    negative outside of djd    Hypogonadism male     negative pituitary MRI 2013    IFG (impaired fasting glucose) 2011    2 hr gtt 124 from 10/11    Internal hemorrhoids 11/15    Dr Ulises Tsai    Lower urinary tract symptoms (LUTS) 5/16    mild    Lumbar post-laminectomy syndrome     Lumbar spinal stenosis     MRI 1/14 and CT 3/14 w lumbar spinal stenosis severe    Overweight (BMI 25.0-29. 9)     IF 12/18 start weight 194 lbs    Primary hypertension     Rosacea     Thoracic or lumbosacral neuritis or radiculitis, unspecified         MEDICATIONS  Current Outpatient Medications   Medication Sig Dispense Refill    testosterone cypionate (DEPO-TESTOSTERONE) 100 mg/mL injection inject 100 MCGS intramuscularly every 10 days 10 mL 5    atorvastatin (LIPITOR) 10 mg tablet take 1 tablet by mouth once daily 90 Tab 3    vardenafiL (LEVITRA) 20 mg tablet Take 20 mg by mouth as needed (ED). 30 Tab 11    Needle, Disp, 18 G (BD Regular Bevel Needles) 18 gauge x 1 1/2\" ndle USE TO DRAW UP TESTOSTERONE AND THEN CHANGE NEEDLE TO GIVE TESTOSTERONE 25 Each 1    losartan (COZAAR) 100 mg tablet Take 1 Tab by mouth daily. 90 Tab 3    hydroCHLOROthiazide (HYDRODIURIL) 25 mg tablet Take 1 Tab by mouth daily.  90 Tab 3    cyclobenzaprine (FLEXERIL) 10 mg tablet Take 10 mg by mouth three (3) times daily as needed for Muscle Spasm(s).  Syringe, Disposable, 3 mL syrg Pt to use with Testosterone injections. 25 Syringe 1    BD REGULAR BEVEL NEEDLES 18 gauge x 1 1/2\" ndle USE TO DRAW UP TESTOSTERONE AND THEN CHANGE NEEDLE TO GIVE TESTOSTERONE 25 Each 1    tiZANidine (Zanaflex) 4 mg tablet Take 4-8 mg by mouth nightly as needed for Muscle Spasm(s).  HYDROcodone-acetaminophen (NORCO)  mg tablet Take 1 Tab by mouth every four to six (4-6) hours as needed for Pain for up to 14 days. Max Daily Amount: 6 Tabs. (Patient taking differently: Take 1 Tab by mouth every four (4) hours. for pain) 30 Tab 0    amLODIPine (NORVASC) 2.5 mg tablet Take 1 Tab by mouth daily. (Patient taking differently: Take 1 Tab by mouth daily. one tablet) 30 Tab 11    naloxone 4 mg/actuation spry 4 mg by Nasal route once as needed (opioid overdose) for up to 1 dose. May substitute 2 mg syringe if insurance requires 1 Package 0    HYDROcodone-acetaminophen (NORCO)  mg tablet Take 0.5-1 Tabs by mouth every four (4) hours as needed for Pain (chronic) for up to 30 days. Indications: Pain 180 Tab 0       ALLERGIES  Allergies   Allergen Reactions    Lisinopril Other (comments)     ED       SOCIAL HISTORY    Social History     Socioeconomic History    Marital status:      Spouse name: Not on file    Number of children: 3    Years of education: Not on file    Highest education level: Not on file   Occupational History    Occupation: [employer]   Social Needs    Financial resource strain: Not on file    Food insecurity     Worry: Not on file     Inability: Not on file   [employer] needs     Medical: Not on file     Non-medical: Not on file   Tobacco Use    Smoking status: Former Smoker     Types: Cigarettes    Smokeless tobacco: Never Used   Substance and Sexual Activity    Alcohol use:  Yes     Alcohol/week: 0.0 standard drinks     Comment: social    Drug use: No    Sexual activity: Not on file   Lifestyle    Physical activity     Days per week: Not on file     Minutes per session: Not on file    Stress: Not on file   Relationships    Social connections     Talks on phone: Not on file     Gets together: Not on file     Attends Jain service: Not on file     Active member of club or organization: Not on file     Attends meetings of clubs or organizations: Not on file     Relationship status: Not on file    Intimate partner violence     Fear of current or ex partner: Not on file     Emotionally abused: Not on file     Physically abused: Not on file     Forced sexual activity: Not on file   Other Topics Concern    Not on file   Social History Narrative    Not on file       SUBJECTIVE      Pain Scale: 0 - No pain/10    Pain Assessment  12/11/2020   Location of Pain Neck;Arm   Location Modifiers Left   Severity of Pain 0   Quality of Pain Throbbing; Other (Comment)   Quality of Pain Comment stabbing   Duration of Pain Persistent   Frequency of Pain Intermittent   Aggravating Factors Other (Comment)   Aggravating Factors Comment sitting in chair a certain way   Limiting Behavior Yes   Relieving Factors Other (Comment)   Relieving Factors Comment sit straight up   Result of Injury No       Accompanied by self. REVIEW OF SYSTEMS  ROS    Constitutional: Negative for fever, chills, or weight change. Respiratory: Negative for cough or shortness of breath. Cardiovascular: Negative for chest pain or palpitations. Gastrointestinal: Negative for acid reflux, change in bowel habits, or constipation. Genitourinary: Negative for incontinence, dysuria and flank pain. Musculoskeletal: Positive for neck, left shoulder pain. Skin: Negative for rash. Neurological: Negative for headaches, dizziness, or numbness. Endo/Heme/Allergies: Negative . Psychiatric/Behavioral: Negative.        PHYSICAL EXAMINATION  Visit Vitals  BP (!) 154/89 (BP 1 Location: Right arm, BP Patient Position: Sitting) Comment: pt asymptomatic, NP aware   Pulse 76   Temp 97.7 °F (36.5 °C) (Skin)   Resp 13   Ht 5' 11\" (1.803 m)   Wt 178 lb (80.7 kg)   SpO2 98% Comment: RA   BMI 24.83 kg/m²       Constitutional: Well developed,  well nourished,  awake, alert, and in no acute distress. Neurological:  Sensation to light touch is intact. Psychiatric: Affect and mood are appropriate. Integumentary: No rashes or abrasions noted on exposed areas,  warm, dry and intact. Cardiovascular/Peripheral Vascular:  No peripheral edema is noted. Lymphatic:  No evidence of lymphedema. No cervical lymphadenopathy. SPINE/MUSCULOSKELETAL EXAM    Cervical spine:  Neck is midline. Normal muscle tone. No focal atrophy is noted. Shoulder ROM intact. Tenderness to palpation to neck. Negative Spurling's sign. Negative Tinel's sign. Negative Real's sign. MOTOR    Biceps  Triceps Deltoids Wrist Ext Wrist Flex Hand Intrin   Right +4/5 +4/5 +4/5 +4/5 +4/5 +4/5   Left +4/5 +4/5 +4/5 +4/5 +4/5 +4/5     normal gait and station    Ambulation without assistive device. full weight bearing, non-antalgic gait.     Mart Graham NP

## 2020-12-11 NOTE — PROGRESS NOTES
Brii Garibay presents today for   Chief Complaint   Patient presents with    Neck Pain    Arm Pain     left       Is someone accompanying this pt? no    Is the patient using any DME equipment during OV? no    Depression Screening:  3 most recent PHQ Screens 1/22/2020   Little interest or pleasure in doing things Not at all   Feeling down, depressed, irritable, or hopeless Not at all   Total Score PHQ 2 0       Learning Assessment:  Learning Assessment 11/13/2019   PRIMARY LEARNER Patient   HIGHEST LEVEL OF EDUCATION - PRIMARY LEARNER  SOME COLLEGE   BARRIERS PRIMARY LEARNER NONE   CO-LEARNER CAREGIVER No   PRIMARY LANGUAGE ENGLISH    NEED -   LEARNER PREFERENCE PRIMARY DEMONSTRATION   LEARNING SPECIAL TOPICS -   ANSWERED BY patient   RELATIONSHIP SELF   ASSESSMENT COMMENT -       Abuse Screening:  Abuse Screening Questionnaire 7/9/2019   Do you ever feel afraid of your partner? N   Are you in a relationship with someone who physically or mentally threatens you? N   Is it safe for you to go home? Y       Fall Risk  Fall Risk Assessment, last 12 mths 10/21/2019   Able to walk? Yes   Fall in past 12 months? No       Coordination of Care:  1. Have you been to the ER, urgent care clinic since your last visit? no  Hospitalized since your last visit? no    2. Have you seen or consulted any other health care providers outside of the [address] [place] [address] since your last visit? no Include any pap smears or colon screening.  no

## 2021-03-01 RX ORDER — NEEDLES, DISPOSABLE 25GX5/8"
NEEDLE, DISPOSABLE MISCELLANEOUS
Qty: 25 EACH | Refills: 1 | Status: SHIPPED | OUTPATIENT
Start: 2021-03-01 | End: 2021-11-18

## 2021-03-16 DIAGNOSIS — E29.1 HYPOGONADISM IN MALE: Primary | ICD-10-CM

## 2021-03-17 ENCOUNTER — DOCUMENTATION ONLY (OUTPATIENT)
Dept: INTERNAL MEDICINE CLINIC | Age: 66
End: 2021-03-17

## 2021-06-11 DIAGNOSIS — E29.1 HYPOGONADISM IN MALE: ICD-10-CM

## 2021-06-11 RX ORDER — TESTOSTERONE CYPIONATE 100 MG/ML
INJECTION, SOLUTION INTRAMUSCULAR
Qty: 10 ML | Refills: 5 | Status: SHIPPED | OUTPATIENT
Start: 2021-06-11 | End: 2021-12-23

## 2021-07-06 RX ORDER — ATORVASTATIN CALCIUM 10 MG/1
TABLET, FILM COATED ORAL
Qty: 90 TABLET | Refills: 3 | Status: SHIPPED | OUTPATIENT
Start: 2021-07-06 | End: 2022-06-26

## 2021-07-20 ENCOUNTER — HOSPITAL ENCOUNTER (OUTPATIENT)
Dept: LAB | Age: 66
Discharge: HOME | End: 2021-07-20
Payer: COMMERCIAL

## 2021-07-20 DIAGNOSIS — I10 ESSENTIAL HYPERTENSION: ICD-10-CM

## 2021-07-20 DIAGNOSIS — R73.01 IFG (IMPAIRED FASTING GLUCOSE): ICD-10-CM

## 2021-07-20 DIAGNOSIS — E29.1 HYPOGONADISM IN MALE: ICD-10-CM

## 2021-07-20 LAB
ALBUMIN SERPL-MCNC: 4.2 G/DL (ref 3.4–5)
ALBUMIN/GLOB SERPL: 1.3 {RATIO} (ref 0.8–1.7)
ALP SERPL-CCNC: 86 U/L (ref 45–117)
ALT SERPL-CCNC: 39 U/L (ref 16–61)
ANION GAP SERPL CALC-SCNC: 3 MMOL/L (ref 3–18)
AST SERPL-CCNC: 25 U/L (ref 10–38)
BILIRUB SERPL-MCNC: 0.6 MG/DL (ref 0.2–1)
BUN SERPL-MCNC: 18 MG/DL (ref 7–18)
BUN/CREAT SERPL: 20 (ref 12–20)
CALCIUM SERPL-MCNC: 9 MG/DL (ref 8.5–10.1)
CHLORIDE SERPL-SCNC: 102 MMOL/L (ref 100–111)
CHOLEST SERPL-MCNC: 142 MG/DL
CO2 SERPL-SCNC: 33 MMOL/L (ref 21–32)
CREAT SERPL-MCNC: 0.91 MG/DL (ref 0.6–1.3)
ERYTHROCYTE [DISTWIDTH] IN BLOOD BY AUTOMATED COUNT: 12.8 % (ref 11.6–14.5)
GLOBULIN SER CALC-MCNC: 3.2 G/DL (ref 2–4)
GLUCOSE SERPL-MCNC: 99 MG/DL (ref 74–99)
HBA1C MFR BLD: 5.4 % (ref 4.2–5.6)
HCT VFR BLD AUTO: 46.8 % (ref 36–48)
HDLC SERPL-MCNC: 48 MG/DL (ref 40–60)
HDLC SERPL: 3 {RATIO} (ref 0–5)
HGB BLD-MCNC: 15.9 G/DL (ref 13–16)
LDLC SERPL CALC-MCNC: 72 MG/DL (ref 0–100)
LIPID PROFILE,FLP: NORMAL
MCH RBC QN AUTO: 30.9 PG (ref 24–34)
MCHC RBC AUTO-ENTMCNC: 34 G/DL (ref 31–37)
MCV RBC AUTO: 90.9 FL (ref 74–97)
PLATELET # BLD AUTO: 237 K/UL (ref 135–420)
PMV BLD AUTO: 10.7 FL (ref 9.2–11.8)
POTASSIUM SERPL-SCNC: 4 MMOL/L (ref 3.5–5.5)
PROT SERPL-MCNC: 7.4 G/DL (ref 6.4–8.2)
PSA SERPL-MCNC: 0.9 NG/ML (ref 0–4)
RBC # BLD AUTO: 5.15 M/UL (ref 4.35–5.65)
SODIUM SERPL-SCNC: 138 MMOL/L (ref 136–145)
TRIGL SERPL-MCNC: 110 MG/DL (ref ?–150)
VLDLC SERPL CALC-MCNC: 22 MG/DL
WBC # BLD AUTO: 7.7 K/UL (ref 4.6–13.2)

## 2021-07-20 PROCEDURE — 80053 COMPREHEN METABOLIC PANEL: CPT

## 2021-07-20 PROCEDURE — 84402 ASSAY OF FREE TESTOSTERONE: CPT

## 2021-07-20 PROCEDURE — 36415 COLL VENOUS BLD VENIPUNCTURE: CPT

## 2021-07-20 PROCEDURE — 80061 LIPID PANEL: CPT

## 2021-07-20 PROCEDURE — 85027 COMPLETE CBC AUTOMATED: CPT

## 2021-07-20 PROCEDURE — 83036 HEMOGLOBIN GLYCOSYLATED A1C: CPT

## 2021-07-20 PROCEDURE — 84153 ASSAY OF PSA TOTAL: CPT

## 2021-07-20 NOTE — PROGRESS NOTES
76 y.o. WHITE/NON- male who presents     He is happy with the results of the cataract surgery    He continues to see pain mgmt and the meds continue to allow him to function with adls. He has mostly good days with the C-spine and happy with the surgical outcomes. No chest pains, sob, pnd, orthopnea, edema. No set exercise but staying active yard work, trying to get the [employer] restarted    No GI or  complaints. He was contacted for follow-up colonoscopy late last year, will try to get it done later this year    Continues on testosterone replacement and no complaints there. No polyuria, polydipsia, nocturia, vision change, not checking sugars at this time. conitnues to do IF at 8 hr window and weight is stable    Vitals 7/27/2021 12/11/2020 12/11/2020 8/25/2020   Weight 175 lb 178 lb  173 lb     Reports having had Covid infection in December 2020 but his episode was rough and not severe mostly viral sx. We had a long discussion regarding pros and cons of vaccination, he is willing to take it now    LAST MEDICARE WELLNESS EXAM: never as not medicare b    IF 12/18    Past Medical History:   Diagnosis Date    Chronic back pain     Dr. Schafer Reach COVID-19 virus infection 12/2020    Dyslipidemia     Erectile dysfunction     Fatty liver     US 2002, serologies negative; fib-4 1.06 from 3/14    FHx: colon cancer     H/O bone scan 3/14    negative outside of djd    Hypertension     Hypogonadism male     negative pituitary MRI 2013    IFG (impaired fasting glucose) 2011    2 hr gtt 124 from 10/11    Internal hemorrhoids 11/15    Dr Grace Palomino    Lower urinary tract symptoms (LUTS) 5/16    mild    Lumbar post-laminectomy syndrome     Lumbar spinal stenosis     MRI 1/14 and CT 3/14 w lumbar spinal stenosis severe    Osteoarthritis     Overweight (BMI 25.0-29. 9)     IF 12/18 start weight 194 lbs    Rosacea     Thoracic or lumbosacral neuritis or radiculitis, unspecified      Past Surgical History:   Procedure Laterality Date    HX CATARACT REMOVAL      Dr Ayaan Alexander RIGHT 9/14; Dr Agnieszka Dickinson 8/20    [address]  12/2019    Dr Osvaldo Ovalle; ant decomp and fusion C3-6, structural allograftx3, ant cerv plate fixation I0-8 w K2M ant cervical locking plate and screws     HX COLONOSCOPY      Dr. Peggy Bacon 2005 neg; 2010 neg; Dr [facility] 11/15 hemorrhoids    Lesiasaman Sherman Baptise 10/17, L 2/18    HX Fredna Bigger  2012    Dr. Nita Man right buttock    HX LUMBAR FUSION  2014    L3/4 for cauda equina, Dr. Baldo Orellana      resection of a RUL lung lesion for recurring polyps 1980s     Social History     Socioeconomic History    Marital status:      Spouse name: Not on file    Number of children: 4    Years of education: Not on file    Highest education level: Not on file   Occupational History    Occupation: [employer]   Tobacco Use    Smoking status: Former Smoker     Types: Cigarettes    Smokeless tobacco: Never Used   Substance and Sexual Activity    Alcohol use: Yes     Alcohol/week: 0.0 standard drinks     Comment: social    Drug use: No    Sexual activity: Not on file   Other Topics Concern    Not on file   Social History Narrative    Not on file     Social Determinants of Health     Financial Resource Strain:     Difficulty of Paying Living Expenses:    Food Insecurity:     Worried About Running Out of Food in the Last Year:     [address] in the Last Year:    Transportation Needs:     Lack of Transportation (Medical):      Lack of Transportation (Non-Medical):    Physical Activity:     Days of Exercise per Week:     Minutes of Exercise per Session:    Stress:     Feeling of Stress :    Social Connections:     Frequency of Communication with Friends and Family:     Frequency of Social Gatherings with Friends and Family:     Attends Episcopal Services:     Active Member of Clubs or Organizations:  Attends Club or Organization Meetings:     Marital Status:    Intimate Partner Violence:     Fear of Current or Ex-Partner:     Emotionally Abused:     Physically Abused:     Sexually Abused: Allergies   Allergen Reactions    Lisinopril Other (comments)     ED      Current Outpatient Medications   Medication Sig    atorvastatin (LIPITOR) 10 mg tablet take 1 tablet by mouth once daily    testosterone cypionate (DEPO-TESTOSTERONE) 100 mg/mL injection inject 100 MCGS intramuscularly every 10 days    Needle, Disp, 18 G (BD Regular Bevel Needles) 18 gauge x 1 1/2\" ndle USE TO DRAW UP TESTOSTERONE AND THEN CHANGE NEEDLE TO GIVE TESTOSTERONE    losartan-hydroCHLOROthiazide (HYZAAR) 100-25 mg per tablet take 1 tablet by mouth once daily    amLODIPine (NORVASC) 2.5 mg tablet take 1 tablet by mouth once daily    vardenafiL (LEVITRA) 20 mg tablet Take 20 mg by mouth as needed (ED).  HYDROcodone-acetaminophen (NORCO)  mg tablet Take 1 Tab by mouth every four to six (4-6) hours as needed for Pain for up to 14 days. Max Daily Amount: 6 Tabs. (Patient taking differently: Take 1 Tab by mouth every four (4) hours. for pain)    Syringe, Disposable, 3 mL syrg Pt to use with Testosterone injections.  naloxone 4 mg/actuation spry 4 mg by Nasal route once as needed (opioid overdose) for up to 1 dose. May substitute 2 mg syringe if insurance requires    HYDROcodone-acetaminophen (NORCO)  mg tablet Take 0.5-1 Tabs by mouth every four (4) hours as needed for Pain (chronic) for up to 30 days. Indications: Pain     No current facility-administered medications for this visit.      REVIEW OF SYSTEMS: colo 11/15 Dr Jonathan Clemons, sees Dr Rossy Cárdenas no vision change or eye pain  Oral  no mouth pain, tongue or tooth problems  Ears  no hearing loss, ear pain, fullness, no swallowing problems  Cardiac  no CP, PND, orthopnea, edema, palpitations or syncope  Chest  no breast masses  Resp  no wheezing, chronic coughing, dyspnea  GI  no heartburn, nausea, vomiting, change in bowel habits, bleeding, hemorrhoids  Urinary  no dysuria, hematuria, flank pain, urgency, frequency  Endo - no polyuria, polydipsia, nocturia, hot flashes    Visit Vitals  /82   Pulse 75   Temp 98.1 °F (36.7 °C) (Temporal)   Resp 12   Ht 5' 11\" (1.803 m)   Wt 175 lb (79.4 kg)   SpO2 99%   BMI 24.41 kg/m²   A&O x3  Affect is appropriate. Mood stable  No apparent distress  Anicteric, no JVD, adenopathy or thyromegaly. No carotid bruits or radiated murmur  Lungs clear to auscultation, no wheezes or rales  Heart showed regular rate and rhythm. No murmur, rubs, gallops  Abdomen soft nontender, no hepatosplenomegaly or masses. Extremities without edema.   Pulses 1-2+ symmetrically  Rectal deferred as going for colo soon    LABS  From 10/11 showed        hba1c 5.8, ldl-p 1008, chol 197, tg 200, hdl 45, ldl-c 112,                   psa 0.70, 2 hr   From 6/12  showed  gluc 104, cr 0.94, gfr 90,  alt 20, hba1c 5.7, ldl-p 2204, chol 197, tg 225, hdl 45, ldl-c 107,  tsh 0.62  From 11/12 showed gluc 100, cr 0.94, gfr 89                                ldl-p 1356                             test 316  From 5/13  showed  gluc 95,   cr 0.86                         hba1c 5.6, ldl-p 1183,  chol 147, tg 171, hdl 53, ldl-c 60,            test 313  From 5/13 showed                                test 196, lh 3.0, psa 0.60  From 11/13 showed        hba1c 5.5,     chol 164, tg 165, hdl 47, ldl-c 84  From 3/14 showed   gluc 91,   cr 0.83, gfr 96,  alt 42,      chol 151, tg 160, hdl 42, ldl-c 77,   wbc 7.7,   hb 15.2, plt 249,          psa 0.60, esr 2, spep neg, cea 0.9, crp 1.30, IL-6 1.2  From 5/14 showed   gluc 156, cr 1.02, gfr>60, alt 61,                 wbc 12.8, hb 14.6, plt 231, b12 722, fol>24(on pred)  From 10/14 showed gluc 101, cr 0.90, gfr 93,  alt 45, hba1c 5.7  From 5/15 showed        hba1c 5.7,     chol 155, tg 157, hdl 41, ldl-c 83, wbc 6.6,   hb 14.6, plt 191, test 103,         psa 0.80  From 11/15 showed        hba1c 5.6,     chol 152, tg 159, hdl 49, ldl-c 81,          test 228  From 5/16 showed           chol 142, tg 118, hdl 38, ldl-c 80,   wbc 6.8,   hb 15.7, plt 203, test 395,         psa 0.9  From 11/16 showed gluc 103, cr 1.14, gfr>60, alt 73,                 test 346  From 5/17 showed           chol 139, tg 182, hdl 45, ldl-c 58,   wbc 7.2,   hb 15.7, plt 217,          psa 0.60, hep c neg  From 11/17 showed gluc 92,  cr 1.00l, gfr>60, alt 59, hba1c 5.4,                test 884  From 6/18 showed          chol 153, tg 163, hdl 44, ldl-c 76,   wbc 6.1,   hb 15.3, plt 206, test 382,         psa 0.70  From 12/18 showed gluc 108, cr 1.01, gfr>60, alt 55, hba1c 5.5,                test 466  From 7/19 showed   gluc 100, cr 0.98, gfr>60,       chol 156, tg 126, hdl 51, ldl-c 80,   wbc 6.7,   hb 15.2, plt 212, test 333,         psa 0.80  From 11/19 showed gluc 89,   cr 0.97, gfr>60,                  wbc 6.9,   hb 15.7, plt 248  From 1/20 showed   gluc 93,   cr 0.95, gfr>60  From 7/20 showed        hba1c 5.1,     chol 136, tg 110, hdl 56, ldl-c 58,   wbc 8.9,   hb 15.9, plt 225,          psa 0.90    We reviewed the patient's labs from the last several visits to point out trends in the numbers            Patient Active Problem List   Diagnosis Code    Dyslipidemia E78.5    S/P lumbar spinal fusion Z98.1    Hypogonadism male E29.1    Erectile dysfunction N52.9    IFG (impaired fasting glucose) R73.01    Primary hypertension I10    Chronic pain syndrome G89.4    Spondylosis of lumbar region without myelopathy or radiculopathy M47.816    Encounter for long-term (current) drug use Z79.899    Cervical spinal stenosis M48.02    S/P cervical spinal fusion Z98.1     ASSESSMENT AND PLAN:  1. Hypertension. Continue current regimen. 2.  Fatty liver. Wt loss, exercise, abstinence reiterated  3. Dyslipidemia. Continue current regimen.   4.  FH colon ca. F/U colo to be scheduled by patient in fall  5. IFG. Resolved with wt loss  6. ED. Prn levitra  7. Hypogonadism. Recheck total and free testosterone in 2 months  8. Chronic pain. F/U pain clinic  9. Overweight. Congratulated him on maintaining the weight loss  10. He will schedule covid vaccination        RTC 7/22    Above conditions discussed at length and patient vocalized understanding.   All questions answered to patient satisfaction

## 2021-07-25 LAB — TESTOST FREE SERPL-MCNC: 24.2 PG/ML (ref 6.6–18.1)

## 2021-07-27 ENCOUNTER — OFFICE VISIT (OUTPATIENT)
Dept: INTERNAL MEDICINE CLINIC | Age: 66
End: 2021-07-27
Payer: COMMERCIAL

## 2021-07-27 VITALS
OXYGEN SATURATION: 99 % | HEART RATE: 75 BPM | RESPIRATION RATE: 12 BRPM | SYSTOLIC BLOOD PRESSURE: 136 MMHG | TEMPERATURE: 98.1 F | HEIGHT: 71 IN | DIASTOLIC BLOOD PRESSURE: 82 MMHG | BODY MASS INDEX: 24.5 KG/M2 | WEIGHT: 175 LBS

## 2021-07-27 DIAGNOSIS — R73.01 IFG (IMPAIRED FASTING GLUCOSE): ICD-10-CM

## 2021-07-27 DIAGNOSIS — I10 ESSENTIAL HYPERTENSION: Primary | ICD-10-CM

## 2021-07-27 DIAGNOSIS — G89.4 CHRONIC PAIN SYNDROME: ICD-10-CM

## 2021-07-27 DIAGNOSIS — E78.5 DYSLIPIDEMIA: ICD-10-CM

## 2021-07-27 DIAGNOSIS — E29.1 HYPOGONADISM MALE: ICD-10-CM

## 2021-07-27 PROCEDURE — 99397 PER PM REEVAL EST PAT 65+ YR: CPT | Performed by: INTERNAL MEDICINE

## 2021-07-27 NOTE — PROGRESS NOTES
Neil Iniguez presents today for   Chief Complaint   Patient presents with    Physical    Hypertension    Cholesterol Problem    Labs     7-20-21         Coordination of Care:  1. Have you been to the ER, urgent care clinic since your last visit? Hospitalized since your last visit? NO    2. Have you seen or consulted any other health care providers outside of the [address] [place] [address] since your last visit? Include any pap smears or colon screening.  YES

## 2021-09-17 ENCOUNTER — HOSPITAL ENCOUNTER (OUTPATIENT)
Dept: LAB | Age: 66
Discharge: HOME | End: 2021-09-17
Payer: COMMERCIAL

## 2021-09-17 DIAGNOSIS — E29.1 HYPOGONADISM MALE: ICD-10-CM

## 2021-09-17 PROCEDURE — 36415 COLL VENOUS BLD VENIPUNCTURE: CPT

## 2021-09-17 PROCEDURE — 84403 ASSAY OF TOTAL TESTOSTERONE: CPT

## 2021-09-22 LAB
TESTOST FREE SERPL-MCNC: 4.3 PG/ML (ref 6.6–18.1)
TESTOST SERPL-MCNC: 81 NG/DL (ref 264–916)

## 2021-09-24 ENCOUNTER — TELEPHONE (OUTPATIENT)
Dept: INTERNAL MEDICINE CLINIC | Age: 66
End: 2021-09-24

## 2021-09-24 NOTE — TELEPHONE ENCOUNTER
pls call    Results for orders placed or performed during the hospital encounter of 09/17/21   TESTOSTERONE, FREE & TOTAL   Result Value Ref Range    Testosterone 81 (L) 264 - 916 ng/dL    Free testosterone (Direct) 4.3 (L) 6.6 - 18.1 pg/mL     Is he taking testosterone or missing doses?

## 2021-09-24 NOTE — TELEPHONE ENCOUNTER
Ok yes, if he got it checked at the end of the dosing, then the level will be lower  Will just recheck in future

## 2021-09-24 NOTE — TELEPHONE ENCOUNTER
Patient returned call, he stated that he is taking it, although sometimes he is a day or two late, he never misses doses. He wondered if the timing of his lab work made a difference, stated he was instructed to go in the middle of doses and he went toward the end.

## 2021-11-18 RX ORDER — NEEDLES, DISPOSABLE 25GX5/8"
NEEDLE, DISPOSABLE MISCELLANEOUS
Qty: 25 EACH | Refills: 1 | Status: SHIPPED | OUTPATIENT
Start: 2021-11-18 | End: 2022-10-11 | Stop reason: SDUPTHER

## 2021-12-17 PROBLEM — D12.6 COLON ADENOMAS: Status: ACTIVE | Noted: 2021-12-17

## 2021-12-22 DIAGNOSIS — E29.1 HYPOGONADISM IN MALE: ICD-10-CM

## 2021-12-23 RX ORDER — TESTOSTERONE CYPIONATE 100 MG/ML
INJECTION, SOLUTION INTRAMUSCULAR
Qty: 10 ML | Refills: 2 | Status: SHIPPED | OUTPATIENT
Start: 2021-12-23 | End: 2022-07-06

## 2021-12-27 ENCOUNTER — DOCUMENTATION ONLY (OUTPATIENT)
Dept: INTERNAL MEDICINE CLINIC | Age: 66
End: 2021-12-27

## 2021-12-27 NOTE — PROGRESS NOTES
The Prior Authorization request has been approved for Depo-Testosterone 100mg/ml.   The authorization is valid from 11/27/2021 through 12/27/2022

## 2022-01-27 DIAGNOSIS — I10 ESSENTIAL HYPERTENSION: ICD-10-CM

## 2022-01-27 RX ORDER — LOSARTAN POTASSIUM AND HYDROCHLOROTHIAZIDE 25; 100 MG/1; MG/1
TABLET ORAL
Qty: 90 TABLET | Refills: 3 | Status: SHIPPED | OUTPATIENT
Start: 2022-01-27

## 2022-01-27 RX ORDER — AMLODIPINE BESYLATE 2.5 MG/1
TABLET ORAL
Qty: 90 TABLET | Refills: 3 | Status: SHIPPED | OUTPATIENT
Start: 2022-01-27

## 2022-03-19 PROBLEM — Z98.1 S/P CERVICAL SPINAL FUSION: Status: ACTIVE | Noted: 2019-12-13

## 2022-03-20 PROBLEM — D12.6 COLON ADENOMAS: Status: ACTIVE | Noted: 2021-12-17

## 2022-03-20 PROBLEM — M48.02 CERVICAL SPINAL STENOSIS: Status: ACTIVE | Noted: 2019-11-13

## 2022-06-26 RX ORDER — ATORVASTATIN CALCIUM 10 MG/1
TABLET, FILM COATED ORAL
Qty: 90 TABLET | Refills: 3 | Status: SHIPPED | OUTPATIENT
Start: 2022-06-26

## 2022-07-05 DIAGNOSIS — E29.1 HYPOGONADISM IN MALE: ICD-10-CM

## 2022-07-06 RX ORDER — TESTOSTERONE CYPIONATE 100 MG/ML
INJECTION, SOLUTION INTRAMUSCULAR
Qty: 10 ML | Refills: 3 | Status: SHIPPED | OUTPATIENT
Start: 2022-07-06 | End: 2022-10-17

## 2022-07-06 NOTE — TELEPHONE ENCOUNTER
Pharmacy needs to clarify the sig on Testosterone should be for mg and not mcg?  Please advise        For Pharmacy Admin Tracking Only     CPA in place:    Recommendation Provided To:    Intervention Detail: New Rx: 1, reason: Cost/Formulary Change   Gap Closed?:    Intervention Accepted By:   [facility] Time Spent (min): 5

## 2022-07-07 RX ORDER — TESTOSTERONE CYPIONATE 100 MG/ML
INJECTION, SOLUTION INTRAMUSCULAR
Qty: 10 ML | Refills: 3 | OUTPATIENT
Start: 2022-07-07

## 2022-07-07 NOTE — TELEPHONE ENCOUNTER
It appears that the last two years of prescriptions were written in this way, but it seems to be contradictory, as the script reads 100 mg/mL, so one mL would equal 100 mg not mcg, as the signature indicates, medication pended with signature reading: inject 1 mL intramuscularly every 10 days.

## 2022-07-18 ENCOUNTER — TELEPHONE (OUTPATIENT)
Dept: INTERNAL MEDICINE CLINIC | Age: 67
End: 2022-07-18

## 2022-07-18 DIAGNOSIS — E29.1 HYPOGONADISM MALE: ICD-10-CM

## 2022-07-18 DIAGNOSIS — Z00.00 ENCOUNTER FOR ANNUAL PHYSICAL EXAM: Primary | ICD-10-CM

## 2022-07-18 DIAGNOSIS — R73.01 IFG (IMPAIRED FASTING GLUCOSE): ICD-10-CM

## 2022-07-18 DIAGNOSIS — I10 ESSENTIAL HYPERTENSION: ICD-10-CM

## 2022-07-18 NOTE — TELEPHONE ENCOUNTER
----- Message from [facility] sent at 7/18/2022  2:48 PM EDT -----  Subject: Message to Provider    QUESTIONS  Information for Provider? Patient would like to make sure that there are   lab orders for him to do on 07/22. Pt has a lab appt to get lab work done   before his appt on 07/29, but does not see any lab orders currently   placed. If there are any questions, pt would like a call back please. Thank you  ---------------------------------------------------------------------------  --------------  Dwain SIERRA  [phone]; OK to leave message on voicemail  ---------------------------------------------------------------------------  --------------  SCRIPT ANSWERS  Relationship to Patient?  Self

## 2022-07-22 ENCOUNTER — APPOINTMENT (OUTPATIENT)
Dept: INTERNAL MEDICINE CLINIC | Age: 67
End: 2022-07-22

## 2022-07-22 ENCOUNTER — HOSPITAL ENCOUNTER (OUTPATIENT)
Dept: LAB | Age: 67
Discharge: HOME | End: 2022-07-22
Payer: COMMERCIAL

## 2022-07-22 DIAGNOSIS — Z00.00 ENCOUNTER FOR ANNUAL PHYSICAL EXAM: ICD-10-CM

## 2022-07-22 DIAGNOSIS — E29.1 HYPOGONADISM MALE: ICD-10-CM

## 2022-07-22 DIAGNOSIS — R73.01 IFG (IMPAIRED FASTING GLUCOSE): ICD-10-CM

## 2022-07-22 DIAGNOSIS — I10 ESSENTIAL HYPERTENSION: ICD-10-CM

## 2022-07-22 LAB
ALBUMIN SERPL-MCNC: 4.2 G/DL (ref 3.4–5)
ALBUMIN/GLOB SERPL: 1.3 {RATIO} (ref 0.8–1.7)
ALP SERPL-CCNC: 78 U/L (ref 45–117)
ALT SERPL-CCNC: 54 U/L (ref 16–61)
ANION GAP SERPL CALC-SCNC: 7 MMOL/L (ref 3–18)
AST SERPL-CCNC: 33 U/L (ref 10–38)
BILIRUB SERPL-MCNC: 0.7 MG/DL (ref 0.2–1)
BUN SERPL-MCNC: 17 MG/DL (ref 7–18)
BUN/CREAT SERPL: 17 (ref 12–20)
CALCIUM SERPL-MCNC: 8.8 MG/DL (ref 8.5–10.1)
CHLORIDE SERPL-SCNC: 100 MMOL/L (ref 100–111)
CHOLEST SERPL-MCNC: 145 MG/DL
CO2 SERPL-SCNC: 30 MMOL/L (ref 21–32)
CREAT SERPL-MCNC: 0.99 MG/DL (ref 0.6–1.3)
ERYTHROCYTE [DISTWIDTH] IN BLOOD BY AUTOMATED COUNT: 13 % (ref 11.6–14.5)
GLOBULIN SER CALC-MCNC: 3.2 G/DL (ref 2–4)
GLUCOSE SERPL-MCNC: 107 MG/DL (ref 74–99)
HBA1C MFR BLD: 5.5 % (ref 4.2–5.6)
HCT VFR BLD AUTO: 50.1 % (ref 36–48)
HDLC SERPL-MCNC: 53 MG/DL (ref 40–60)
HDLC SERPL: 2.7 {RATIO} (ref 0–5)
HGB BLD-MCNC: 16.4 G/DL (ref 13–16)
LDLC SERPL CALC-MCNC: 62.6 MG/DL (ref 0–100)
LIPID PROFILE,FLP: NORMAL
MCH RBC QN AUTO: 31.2 PG (ref 24–34)
MCHC RBC AUTO-ENTMCNC: 32.7 G/DL (ref 31–37)
MCV RBC AUTO: 95.4 FL (ref 78–100)
NRBC # BLD: 0 K/UL (ref 0–0.01)
NRBC BLD-RTO: 0 PER 100 WBC
PLATELET # BLD AUTO: 230 K/UL (ref 135–420)
PMV BLD AUTO: 11.1 FL (ref 9.2–11.8)
POTASSIUM SERPL-SCNC: 3.8 MMOL/L (ref 3.5–5.5)
PROT SERPL-MCNC: 7.4 G/DL (ref 6.4–8.2)
PSA SERPL-MCNC: 0.9 NG/ML (ref 0–4)
RBC # BLD AUTO: 5.25 M/UL (ref 4.35–5.65)
SODIUM SERPL-SCNC: 137 MMOL/L (ref 136–145)
TRIGL SERPL-MCNC: 147 MG/DL (ref ?–150)
VLDLC SERPL CALC-MCNC: 29.4 MG/DL
WBC # BLD AUTO: 7.6 K/UL (ref 4.6–13.2)

## 2022-07-22 PROCEDURE — 83036 HEMOGLOBIN GLYCOSYLATED A1C: CPT

## 2022-07-22 PROCEDURE — 80053 COMPREHEN METABOLIC PANEL: CPT

## 2022-07-22 PROCEDURE — 85027 COMPLETE CBC AUTOMATED: CPT

## 2022-07-22 PROCEDURE — 36415 COLL VENOUS BLD VENIPUNCTURE: CPT

## 2022-07-22 PROCEDURE — 80061 LIPID PANEL: CPT

## 2022-07-22 PROCEDURE — 84153 ASSAY OF PSA TOTAL: CPT

## 2022-07-22 PROCEDURE — 84403 ASSAY OF TOTAL TESTOSTERONE: CPT

## 2022-07-24 LAB
TESTOST FREE SERPL-MCNC: 13.8 PG/ML (ref 6.6–18.1)
TESTOST SERPL-MCNC: 441 NG/DL (ref 264–916)

## 2022-07-25 NOTE — PROGRESS NOTES
78 y.o. WHITE/NON- male who presents     He continues to see pain mgmt and the meds continue to allow him to function with adls. Happy with the cspine surgery as well. No cardiovascular complaints, he's staying active yard work and chores and  doing the [employer]    No GI or  complaints. Had colo within the last year as below and told to have follow up in 3 yrs    Continues on testosterone replacement and no complaints there. No polyuria, polydipsia, nocturia, vision change, not checking sugars at this time. conitnues to do IF at 8 hr window and weight is stable    LAST MEDICARE WELLNESS EXAM: never as not medicare b    IF 12/18    Past Medical History:   Diagnosis Date    Chronic back pain     Dr. Audelia Holloway adenomas 12/2021    Dr Tracie Saini    COVID-19 vaccine series declined 07/2022    COVID-19 virus infection 12/2020    Dyslipidemia     ED (erectile dysfunction)     Fatty liver     US 2002, serologies negative; fib-4 1.06 from 3/14    FHx: colon cancer     H/O bone scan 03/2014    negative outside of [facility]    Hypertension     Hypogonadism male     negative pituitary MRI 2013    IFG (impaired fasting glucose) 2011    2 hr gtt 124 from 10/11    Internal hemorrhoids 11/2015    Dr Tracie Saini    Lower urinary tract symptoms (LUTS) 05/2016    mild    Lumbar post-laminectomy syndrome     Lumbar spinal stenosis     MRI 1/14 and CT 3/14 w lumbar spinal stenosis severe    Osteoarthritis     Overweight (BMI 25.0-29. 9)     IF 12/18 start weight 194 lbs    Rosacea     Thoracic or lumbosacral neuritis or radiculitis, unspecified      Past Surgical History:   Procedure Laterality Date    HX CATARACT REMOVAL Bilateral     Dr Katherine Young 9/14; Dr Shahnaz Cohen 8/20    HX CERVICAL FUSION  12/2019    Dr Brandon Peterson; ant decomp and fusion C3-6, structural allograftx3, ant cerv plate fixation L1-3 w K2M ant cervical locking plate and screws     HX COLONOSCOPY      Dr. Eyal Dunham 2005 neg; 2010 neg; Dr Tracie Saini 11/15 hemorrhoids; 12/13/21 3 adenomas    HX CORNEAL TRANSPLANT      Dr Morro Whitaker 10/17, L 2/18    HX LIPOMA RESECTION  2012    Dr. Latrice Shafer right buttock    HX LUMBAR FUSION  2014    L3/4 for cauda equina, Dr. Bowden Riding      resection of a RUL lung lesion for recurring polyps 1980s     Social History     Socioeconomic History    Marital status:      Spouse name: Not on file    Number of children: 4    Years of education: Not on file    Highest education level: Not on file   Occupational History    Occupation: [employer]   Tobacco Use    Smoking status: Former     Types: Cigarettes    Smokeless tobacco: Never   Substance and Sexual Activity    Alcohol use: Yes     Alcohol/week: 0.0 standard drinks     Comment: social    Drug use: No    Sexual activity: Not on file   Other Topics Concern    Not on file   Social History Narrative    Not on file     Social Determinants of Health     Financial Resource Strain: Not on file   Food Insecurity: Not on file   Transportation Needs: Not on file   Physical Activity: Not on file   Stress: Not on file   Social Connections: Not on file   Intimate Partner Violence: Not on file   Housing Stability: Not on file     Allergies   Allergen Reactions    Lisinopril Other (comments)     ED      Current Outpatient Medications   Medication Sig    vardenafiL (LEVITRA) 20 mg tablet Take 20 mg by mouth as needed (ED).     testosterone cypionate (DEPO-TESTOSTERONE) 100 mg/mL injection inject 100 MCGS intramuscularly every 10 days    atorvastatin (LIPITOR) 10 mg tablet take 1 tablet by mouth once daily    amLODIPine (NORVASC) 2.5 mg tablet take 1 tablet by mouth once daily    losartan-hydroCHLOROthiazide (HYZAAR) 100-25 mg per tablet take 1 tablet by mouth once daily    Needle, Disp, 18 G (BD Regular Bevel Needles) 18 gauge x 1 1/2\" ndle USE TO DRAW UP TESTOSTERONE THEN CHANGE NEEDLE TO GIVE TESTOSTERONE    Syringe, Disposable, 3 mL syrg Pt to use with Testosterone injections. naloxone 4 mg/actuation spry 4 mg by Nasal route once as needed (opioid overdose) for up to 1 dose. May substitute 2 mg syringe if insurance requires    pregabalin (LYRICA) 75 mg capsule Take 75 mg by mouth two (2) times a day. HYDROcodone-acetaminophen (NORCO)  mg tablet Take 0.5-1 Tabs by mouth every four (4) hours as needed for Pain (chronic) for up to 30 days. Indications: Pain     No current facility-administered medications for this visit. REVIEW OF SYSTEMS: colo 2021 Dr Jayden Crews, sees Dr Mahmood Masters  Ophtho - no vision change or eye pain  Oral - no mouth pain, tongue or tooth problems  Ears - no hearing loss, ear pain, fullness, no swallowing problems  Cardiac - no CP, PND, orthopnea, edema, palpitations or syncope  Chest - no breast masses  Resp - no wheezing, chronic coughing, dyspnea  GI - no heartburn, nausea, vomiting, change in bowel habits, bleeding, hemorrhoids  Urinary - no dysuria, hematuria, flank pain, urgency, frequency  Endo - no polyuria, polydipsia, nocturia, hot flashes    Visit Vitals  /72   Pulse 74   Temp 97.4 °F (36.3 °C) (Temporal)   Resp 16   Ht 5' 11\" (1.803 m)   Wt 176 lb (79.8 kg)   SpO2 98%   BMI 24.55 kg/m²     A&O x3  Affect is appropriate. Mood stable  No apparent distress  Anicteric, no JVD, adenopathy or thyromegaly. No carotid bruits or radiated murmur  Lungs clear to auscultation, no wheezes or rales  Heart showed regular rate and rhythm. No murmur, rubs, gallops  Abdomen soft nontender, no hepatosplenomegaly or masses. Extremities without edema.   Pulses 1-2+ symmetrically  Rectal deferred     LABS  From 10/11 showed        hba1c 5.8, ldl-p 1008, chol 197, tg 200, hdl 45, ldl-c 112,                   psa 0.70, 2 hr   From 6/12  showed  gluc 104, cr 0.94, gfr 90,  alt 20, hba1c 5.7, ldl-p 2204, chol 197, tg 225, hdl 45, ldl-c 107,  tsh 0.62  From 11/12 showed gluc 100, cr 0.94, gfr 89                                ldl-p 1356                       test 316  From 5/13  showed  gluc 95,   cr 0.86                         hba1c 5.6, ldl-p 1183, chol 147, tg 171, hdl 53, ldl-c 60,            test 313  From 5/13 showed                                test 196, lh 3.0, psa 0.60  From 11/13 showed        hba1c 5.5,     chol 164, tg 165, hdl 47, ldl-c 84  From 3/14 showed   gluc 91,   cr 0.83, gfr 96,  alt 42,      chol 151, tg 160, hdl 42, ldl-c 77,   wbc 7.7,   hb 15.2, plt 249,          psa 0.60, esr 2, spep neg, cea 0.9, crp 1.30, IL-6 1.2  From 5/14 showed   gluc 156, cr 1.02, gfr>60, alt 61,                  wbc 12.8, hb 14.6, plt 231,          b12 722, fol>24(on pred)  From 10/14 showed gluc 101, cr 0.90, gfr 93,  alt 45, hba1c 5.7  From 5/15 showed        hba1c 5.7,     chol 155, tg 157, hdl 41, ldl-c 83,   wbc 6.6,   hb 14.6, plt 191, test 103,         psa 0.80  From 11/15 showed        hba1c 5.6,     chol 152, tg 159, hdl 49, ldl-c 81,           test 228  From 5/16 showed           chol 142, tg 118, hdl 38, ldl-c 80,   wbc 6.8,   hb 15.7, plt 203, test 395,         psa 0.9  From 11/16 showed gluc 103, cr 1.14, gfr>60, alt 73,                  test 346  From 5/17 showed           chol 139, tg 182, hdl 45, ldl-c 58,   wbc 7.2,   hb 15.7, plt 217,          psa 0.60, hep c neg  From 11/17 showed gluc 92,  cr 1.00l, gfr>60, alt 59, hba1c 5.4,                 test 884  From 6/18 showed          chol 153, tg 163, hdl 44, ldl-c 76,   wbc 6.1,   hb 15.3, plt 206, test 382,         psa 0.70  From 12/18 showed gluc 108, cr 1.01, gfr>60, alt 55, hba1c 5.5,                 test 466  From 7/19 showed   gluc 100, cr 0.98, gfr>60,       chol 156, tg 126, hdl 51, ldl-c 80,   wbc 6.7,   hb 15.2, plt 212, test 333,         psa 0.80  From 11/19 showed gluc 89,   cr 0.97, gfr>60,                   wbc 6.9,   hb 15.7, plt 248  From 1/20 showed   gluc 93,   cr 0.95, gfr>60  From 7/20 showed        hba1c 5.1,     chol 136, tg 110, hdl 56, ldl-c 58,   wbc 8.9,   hb 15.9, plt 225,          psa 0.90  From 7/21 showed   gluc 99,   cr 0.91, gfr>60,    hba1c 5.4,     chol 142, tg 110, hdl 48, ldl-c 72,   wbc 7.7,   hb 15.9, plt 239,          psa 0.90    Results for orders placed or performed during the hospital encounter of 07/22/22   TESTOSTERONE, FREE & TOTAL   Result Value Ref Range    Testosterone 441 264 - 916 ng/dL    Free testosterone (Direct) 13.8 6.6 - 18.1 pg/mL   PSA, DIAGNOSTIC (PROSTATE SPECIFIC AG)   Result Value Ref Range    Prostate Specific Ag 0.9 0.0 - 4.0 ng/mL   METABOLIC PANEL, COMPREHENSIVE   Result Value Ref Range    Sodium 137 136 - 145 mmol/L    Potassium 3.8 3.5 - 5.5 mmol/L    Chloride 100 100 - 111 mmol/L    CO2 30 21 - 32 mmol/L    Anion gap 7 3.0 - 18 mmol/L    Glucose 107 (H) 74 - 99 mg/dL    BUN 17 7.0 - 18 MG/DL    Creatinine 0.99 0.6 - 1.3 MG/DL    BUN/Creatinine ratio 17 12 - 20      GFR est AA >60 >60 ml/min/1.73m2    GFR est non-AA >60 >60 ml/min/1.73m2    Calcium 8.8 8.5 - 10.1 MG/DL    Bilirubin, total 0.7 0.2 - 1.0 MG/DL    ALT (SGPT) 54 16 - 61 U/L    AST (SGOT) 33 10 - 38 U/L    Alk.  phosphatase 78 45 - 117 U/L    Protein, total 7.4 6.4 - 8.2 g/dL    Albumin 4.2 3.4 - 5.0 g/dL    Globulin 3.2 2.0 - 4.0 g/dL    A-G Ratio 1.3 0.8 - 1.7     LIPID PANEL   Result Value Ref Range    LIPID PROFILE          Cholesterol, total 145 <200 MG/DL    Triglyceride 147 <150 MG/DL    HDL Cholesterol 53 40 - 60 MG/DL    LDL, calculated 62.6 0 - 100 MG/DL    VLDL, calculated 29.4 MG/DL    CHOL/HDL Ratio 2.7 0 - 5.0     CBC W/O DIFF   Result Value Ref Range    WBC 7.6 4.6 - 13.2 K/uL    RBC 5.25 4.35 - 5.65 M/uL    HGB 16.4 (H) 13.0 - 16.0 g/dL    HCT 50.1 (H) 36.0 - 48.0 %    MCV 95.4 78.0 - 100.0 FL    MCH 31.2 24.0 - 34.0 PG    MCHC 32.7 31.0 - 37.0 g/dL    RDW 13.0 11.6 - 14.5 %    PLATELET 442 940 - 902 K/uL    MPV 11.1 9.2 - 11.8 FL    NRBC 0.0 0  WBC    ABSOLUTE NRBC 0.00 0.00 - 0.01 K/uL   HEMOGLOBIN A1C W/O EAG   Result Value Ref Range Hemoglobin A1c 5.5 4.2 - 5.6 %       We reviewed the patient's labs from the last several visits to point out trends in the numbers            Patient Active Problem List   Diagnosis Code    Dyslipidemia E78.5    S/P lumbar spinal fusion Z98.1    Hypogonadism male E29.1    Erectile dysfunction N52.9    IFG (impaired fasting glucose) R73.01    Primary hypertension I10    Chronic pain syndrome G89.4    Spondylosis of lumbar region without myelopathy or radiculopathy M47.816    Encounter for long-term (current) drug use Z79.899    Cervical spinal stenosis M48.02    S/P cervical spinal fusion Z98.1    Colon adenomas D12.6     ASSESSMENT AND PLAN:  1. Hypertension. Controlled on current regimen. 2.  Fatty liver. Wt loss, exercise, abstinence reiterated  3. Dyslipidemia. Continue current regimen. 4.  FH colon ca. Fiber, colo 2024  5. IFG. Resolved with wt loss  6. ED. Prn levitra  7. Hypogonadism. Therapeutic range  8. Chronic pain. F/U pain clinic  9. Overweight. Congratulated him on maintaining the weight loss  10. Declined covid vaccine after another discussion        RTC 7/23    Above conditions discussed at length and patient vocalized understanding. All questions answered to patient satisfaction        ICD-10-CM ICD-9-CM    1. Physical exam  Z00.00 V70.9 CBC W/O DIFF      METABOLIC PANEL, COMPREHENSIVE      LIPID PANEL      PSA SCREENING (SCREENING)      HEMOGLOBIN A1C WITH EAG      TESTOSTERONE, TOTAL, ADULT MALE      2. Erectile dysfunction, unspecified erectile dysfunction type  N52.9 607.84 vardenafiL (LEVITRA) 20 mg tablet      3. Primary hypertension  I10 401.9       4. Colon adenomas  D12.6 211.3       5. IFG (impaired fasting glucose)  R73.01 790.21 HEMOGLOBIN A1C WITH EAG      6. Hypogonadism male  E29.1 257.2 PSA SCREENING (SCREENING)      TESTOSTERONE, TOTAL, ADULT MALE      7. Dyslipidemia  E78.5 272.4       8.  Chronic pain syndrome  G89.4 338.4

## 2022-07-29 ENCOUNTER — OFFICE VISIT (OUTPATIENT)
Dept: INTERNAL MEDICINE CLINIC | Age: 67
End: 2022-07-29
Payer: COMMERCIAL

## 2022-07-29 VITALS
HEIGHT: 71 IN | HEART RATE: 74 BPM | OXYGEN SATURATION: 98 % | WEIGHT: 176 LBS | SYSTOLIC BLOOD PRESSURE: 134 MMHG | TEMPERATURE: 97.4 F | BODY MASS INDEX: 24.64 KG/M2 | RESPIRATION RATE: 16 BRPM | DIASTOLIC BLOOD PRESSURE: 72 MMHG

## 2022-07-29 DIAGNOSIS — Z00.00 PHYSICAL EXAM: Primary | ICD-10-CM

## 2022-07-29 DIAGNOSIS — G89.4 CHRONIC PAIN SYNDROME: ICD-10-CM

## 2022-07-29 DIAGNOSIS — N52.9 ERECTILE DYSFUNCTION, UNSPECIFIED ERECTILE DYSFUNCTION TYPE: ICD-10-CM

## 2022-07-29 DIAGNOSIS — R73.01 IFG (IMPAIRED FASTING GLUCOSE): ICD-10-CM

## 2022-07-29 DIAGNOSIS — E78.5 DYSLIPIDEMIA: ICD-10-CM

## 2022-07-29 DIAGNOSIS — D12.6 COLON ADENOMAS: ICD-10-CM

## 2022-07-29 DIAGNOSIS — E29.1 HYPOGONADISM MALE: ICD-10-CM

## 2022-07-29 DIAGNOSIS — I10 ESSENTIAL HYPERTENSION: ICD-10-CM

## 2022-07-29 PROCEDURE — 99397 PER PM REEVAL EST PAT 65+ YR: CPT | Performed by: INTERNAL MEDICINE

## 2022-07-29 RX ORDER — PREGABALIN 75 MG/1
75 CAPSULE ORAL 2 TIMES DAILY
COMMUNITY
Start: 2022-05-31

## 2022-07-29 RX ORDER — VARDENAFIL HYDROCHLORIDE 20 MG/1
20 TABLET ORAL AS NEEDED
Qty: 30 TABLET | Refills: 11 | Status: SHIPPED | OUTPATIENT
Start: 2022-07-29

## 2022-08-02 ENCOUNTER — TELEPHONE (OUTPATIENT)
Dept: INTERNAL MEDICINE CLINIC | Age: 67
End: 2022-08-02

## 2022-10-11 RX ORDER — NEEDLES, DISPOSABLE 25GX5/8"
NEEDLE, DISPOSABLE MISCELLANEOUS
Qty: 25 EACH | Refills: 1 | Status: SHIPPED | OUTPATIENT
Start: 2022-10-11

## 2022-10-11 NOTE — TELEPHONE ENCOUNTER
Last Visit: 7/29/22 with MD Bailey Layton  Next Appointment: 7/31/23 with MD Bailey Layton  Previous Refill Encounter(s): 11/18/21 #25 with 1 refill    Requested Prescriptions     Pending Prescriptions Disp Refills    Needle, Disp, 18 G (BD Regular Bevel Needles) 18 gauge x 1 1/2\" ndle 25 Each 1     Sig: USE TO DRAW UP TESTOSTERONE THEN CHANGE NEEDLE TO GIVE TESTOSTERONE         For Pharmacy Admin Tracking Only    CPA in place:   Recommendation Provided To:    Intervention Detail: New Rx: 1, reason: Patient Preference  Gap Closed?:   Intervention Accepted By:   Time Spent (min): 5

## 2022-10-17 DIAGNOSIS — E29.1 HYPOGONADISM IN MALE: ICD-10-CM

## 2022-10-17 RX ORDER — TESTOSTERONE CYPIONATE 100 MG/ML
INJECTION, SOLUTION INTRAMUSCULAR
Qty: 10 ML | Refills: 1 | Status: SHIPPED | OUTPATIENT
Start: 2022-10-17

## 2022-11-01 ENCOUNTER — TELEPHONE (OUTPATIENT)
Dept: INTERNAL MEDICINE CLINIC | Age: 67
End: 2022-11-01

## 2022-11-01 NOTE — TELEPHONE ENCOUNTER
Pt's wife calling regarding physical appt and billing for 07/29/2022    Stated per her  it was a very routine non problem focused visit. It was his yearly physical appt. Stated they received a large bill and it was noted   [address] first 30 min which she was explained by her ins co is a charge associated with hospice. Please review and advise.

## 2022-11-02 NOTE — TELEPHONE ENCOUNTER
Doctor re coded. Billing notified to re file with correct code and remove old code. Pt notified updates are being made and apologies offered.

## 2023-01-21 DIAGNOSIS — I10 ESSENTIAL HYPERTENSION: ICD-10-CM

## 2023-01-23 RX ORDER — LOSARTAN POTASSIUM AND HYDROCHLOROTHIAZIDE 25; 100 MG/1; MG/1
TABLET ORAL
Qty: 90 TABLET | Refills: 3 | Status: SHIPPED | OUTPATIENT
Start: 2023-01-23

## 2023-01-23 RX ORDER — AMLODIPINE BESYLATE 2.5 MG/1
TABLET ORAL
Qty: 90 TABLET | Refills: 3 | Status: SHIPPED | OUTPATIENT
Start: 2023-01-23

## 2023-03-17 ENCOUNTER — TELEPHONE (OUTPATIENT)
Age: 68
End: 2023-03-17
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
## 2023-03-21 NOTE — TELEPHONE ENCOUNTER
Patients wife contacted and given the information provided by Mignon Infante and told that we are unable to write this letter.
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
## 2023-07-03 RX ORDER — ATORVASTATIN CALCIUM 10 MG/1
TABLET, FILM COATED ORAL
Qty: 90 TABLET | Refills: 0 | Status: SHIPPED | OUTPATIENT
Start: 2023-07-03

## 2023-07-03 NOTE — TELEPHONE ENCOUNTER
PCP:  Tomy Hayward MD    Last appt: [unfilled]  Future Appointments   Date Time Provider 4600  [address]   7/24/2023  7:55 AM IO LAB VISIT [facility] KRISTINE OCHOA   7/31/2023  8:00 AM Tomy Hayward MD [facility] BS GABRIELA       Requested Prescriptions     Pending Prescriptions Disp Refills    atorvastatin (LIPITOR) 10 MG tablet [Pharmacy Med Name: ATORVASTATIN 10 MG TABLET] 90 tablet 0     Sig: take 1 tablet by mouth once daily

## 2023-07-24 ENCOUNTER — HOSPITAL ENCOUNTER (OUTPATIENT)
Facility: HOSPITAL | Age: 68
Setting detail: SPECIMEN
Discharge: HOME | End: 2023-07-27
Payer: COMMERCIAL

## 2023-07-24 ENCOUNTER — TELEPHONE (OUTPATIENT)
Age: 68
End: 2023-07-24

## 2023-07-24 DIAGNOSIS — E78.5 DYSLIPIDEMIA: ICD-10-CM

## 2023-07-24 DIAGNOSIS — E29.1 HYPOGONADISM MALE: ICD-10-CM

## 2023-07-24 DIAGNOSIS — Z12.5 SCREENING FOR MALIGNANT NEOPLASM OF PROSTATE: ICD-10-CM

## 2023-07-24 DIAGNOSIS — R73.01 IFG (IMPAIRED FASTING GLUCOSE): ICD-10-CM

## 2023-07-24 DIAGNOSIS — I10 ESSENTIAL HYPERTENSION: ICD-10-CM

## 2023-07-24 DIAGNOSIS — R73.01 IFG (IMPAIRED FASTING GLUCOSE): Primary | ICD-10-CM

## 2023-07-24 LAB
ALBUMIN SERPL-MCNC: 4.1 G/DL (ref 3.4–5)
ALBUMIN/GLOB SERPL: 1.2 (ref 0.8–1.7)
ALP SERPL-CCNC: 89 U/L (ref 45–117)
ALT SERPL-CCNC: 50 U/L (ref 16–61)
ANION GAP SERPL CALC-SCNC: 6 MMOL/L (ref 3–18)
AST SERPL-CCNC: 26 U/L (ref 10–38)
BASOPHILS # BLD: 0 K/UL (ref 0–0.1)
BASOPHILS NFR BLD: 0 % (ref 0–2)
BILIRUB SERPL-MCNC: 0.8 MG/DL (ref 0.2–1)
BUN SERPL-MCNC: 14 MG/DL (ref 7–18)
BUN/CREAT SERPL: 13 (ref 12–20)
CALCIUM SERPL-MCNC: 9.2 MG/DL (ref 8.5–10.1)
CHLORIDE SERPL-SCNC: 101 MMOL/L (ref 100–111)
CHOLEST SERPL-MCNC: 225 MG/DL
CO2 SERPL-SCNC: 30 MMOL/L (ref 21–32)
CREAT SERPL-MCNC: 1.07 MG/DL (ref 0.6–1.3)
DIFFERENTIAL METHOD BLD: ABNORMAL
EOSINOPHIL # BLD: 0.3 K/UL (ref 0–0.4)
EOSINOPHIL NFR BLD: 2 % (ref 0–5)
ERYTHROCYTE [DISTWIDTH] IN BLOOD BY AUTOMATED COUNT: 13.6 % (ref 11.6–14.5)
GLOBULIN SER CALC-MCNC: 3.5 G/DL (ref 2–4)
GLUCOSE SERPL-MCNC: 111 MG/DL (ref 74–99)
HBA1C MFR BLD: 5.4 % (ref 4.2–5.6)
HCT VFR BLD AUTO: 40.6 % (ref 36–48)
HDLC SERPL-MCNC: 76 MG/DL (ref 40–60)
HDLC SERPL: 3 (ref 0–5)
HGB BLD-MCNC: 13.3 G/DL (ref 13–16)
IMM GRANULOCYTES # BLD AUTO: 0 K/UL (ref 0–0.04)
IMM GRANULOCYTES NFR BLD AUTO: 0 % (ref 0–0.5)
LDLC SERPL CALC-MCNC: 115.8 MG/DL (ref 0–100)
LIPID PANEL: ABNORMAL
LYMPHOCYTES # BLD: 2.1 K/UL (ref 0.9–3.6)
LYMPHOCYTES NFR BLD: 16 % (ref 21–52)
MCH RBC QN AUTO: 31.3 PG (ref 24–34)
MCHC RBC AUTO-ENTMCNC: 32.8 G/DL (ref 31–37)
MCV RBC AUTO: 95.5 FL (ref 78–100)
MONOCYTES # BLD: 1.1 K/UL (ref 0.05–1.2)
MONOCYTES NFR BLD: 8 % (ref 3–10)
NEUTS SEG # BLD: 9.4 K/UL (ref 1.8–8)
NEUTS SEG NFR BLD: 71 % (ref 40–73)
NRBC # BLD: 0 K/UL (ref 0–0.01)
NRBC BLD-RTO: 0 PER 100 WBC
OTHER CELLS NFR BLD MANUAL: 3
PLATELET # BLD AUTO: 237 K/UL (ref 135–420)
PLATELET COMMENT: ABNORMAL
PMV BLD AUTO: 10.9 FL (ref 9.2–11.8)
POTASSIUM SERPL-SCNC: 3.8 MMOL/L (ref 3.5–5.5)
PROT SERPL-MCNC: 7.6 G/DL (ref 6.4–8.2)
PSA SERPL-MCNC: 0.5 NG/ML (ref 0–4)
RBC # BLD AUTO: 4.25 M/UL (ref 4.35–5.65)
RBC MORPH BLD: ABNORMAL
SODIUM SERPL-SCNC: 137 MMOL/L (ref 136–145)
TRIGL SERPL-MCNC: 166 MG/DL
VLDLC SERPL CALC-MCNC: 33.2 MG/DL
WBC # BLD AUTO: 13.2 K/UL (ref 4.6–13.2)

## 2023-07-24 PROCEDURE — 85025 COMPLETE CBC W/AUTO DIFF WBC: CPT

## 2023-07-24 PROCEDURE — G0103 PSA SCREENING: HCPCS

## 2023-07-24 PROCEDURE — 84403 ASSAY OF TOTAL TESTOSTERONE: CPT

## 2023-07-24 PROCEDURE — 84402 ASSAY OF FREE TESTOSTERONE: CPT

## 2023-07-24 PROCEDURE — 80053 COMPREHEN METABOLIC PANEL: CPT

## 2023-07-24 PROCEDURE — 83036 HEMOGLOBIN GLYCOSYLATED A1C: CPT

## 2023-07-24 PROCEDURE — 80061 LIPID PANEL: CPT

## 2023-07-24 PROCEDURE — 36415 COLL VENOUS BLD VENIPUNCTURE: CPT

## 2023-07-30 LAB
TESTOST FREE SERPL-MCNC: 1 PG/ML (ref 6.6–18.1)
TESTOST SERPL-MCNC: 34 NG/DL (ref 264–916)

## 2023-07-31 ENCOUNTER — OFFICE VISIT (OUTPATIENT)
Age: 68
End: 2023-07-31
Payer: COMMERCIAL

## 2023-07-31 VITALS
OXYGEN SATURATION: 98 % | WEIGHT: 173.8 LBS | HEIGHT: 71 IN | TEMPERATURE: 98.5 F | SYSTOLIC BLOOD PRESSURE: 132 MMHG | DIASTOLIC BLOOD PRESSURE: 72 MMHG | RESPIRATION RATE: 16 BRPM | BODY MASS INDEX: 24.33 KG/M2 | HEART RATE: 67 BPM

## 2023-07-31 DIAGNOSIS — I10 ESSENTIAL HYPERTENSION: ICD-10-CM

## 2023-07-31 DIAGNOSIS — E78.5 DYSLIPIDEMIA: ICD-10-CM

## 2023-07-31 DIAGNOSIS — N52.9 ERECTILE DYSFUNCTION, UNSPECIFIED ERECTILE DYSFUNCTION TYPE: ICD-10-CM

## 2023-07-31 DIAGNOSIS — Z00.00 PHYSICAL EXAM: Primary | ICD-10-CM

## 2023-07-31 DIAGNOSIS — R73.01 IFG (IMPAIRED FASTING GLUCOSE): ICD-10-CM

## 2023-07-31 DIAGNOSIS — M47.816 SPONDYLOSIS OF LUMBAR REGION WITHOUT MYELOPATHY OR RADICULOPATHY: ICD-10-CM

## 2023-07-31 DIAGNOSIS — G89.4 CHRONIC PAIN SYNDROME: ICD-10-CM

## 2023-07-31 PROCEDURE — 3078F DIAST BP <80 MM HG: CPT | Performed by: INTERNAL MEDICINE

## 2023-07-31 PROCEDURE — 99397 PER PM REEVAL EST PAT 65+ YR: CPT | Performed by: INTERNAL MEDICINE

## 2023-07-31 PROCEDURE — 3075F SYST BP GE 130 - 139MM HG: CPT | Performed by: INTERNAL MEDICINE

## 2023-07-31 RX ORDER — VARDENAFIL HYDROCHLORIDE 20 MG/1
10 TABLET ORAL PRN
Qty: 30 TABLET | Refills: 5 | Status: SHIPPED | OUTPATIENT
Start: 2023-07-31

## 2023-07-31 SDOH — ECONOMIC STABILITY: FOOD INSECURITY: WITHIN THE PAST 12 MONTHS, YOU WORRIED THAT YOUR FOOD WOULD RUN OUT BEFORE YOU GOT MONEY TO BUY MORE.: NEVER TRUE

## 2023-07-31 SDOH — ECONOMIC STABILITY: FOOD INSECURITY: WITHIN THE PAST 12 MONTHS, THE FOOD YOU BOUGHT JUST DIDN'T LAST AND YOU DIDN'T HAVE MONEY TO GET MORE.: NEVER TRUE

## 2023-07-31 SDOH — ECONOMIC STABILITY: INCOME INSECURITY: HOW HARD IS IT FOR YOU TO PAY FOR THE VERY BASICS LIKE FOOD, HOUSING, MEDICAL CARE, AND HEATING?: NOT HARD AT ALL

## 2023-07-31 SDOH — ECONOMIC STABILITY: HOUSING INSECURITY
IN THE LAST 12 MONTHS, WAS THERE A TIME WHEN YOU DID NOT HAVE A STEADY PLACE TO SLEEP OR SLEPT IN A SHELTER (INCLUDING NOW)?: NO

## 2023-07-31 ASSESSMENT — PATIENT HEALTH QUESTIONNAIRE - PHQ9
SUM OF ALL RESPONSES TO PHQ QUESTIONS 1-9: 0
SUM OF ALL RESPONSES TO PHQ9 QUESTIONS 1 & 2: 0
SUM OF ALL RESPONSES TO PHQ QUESTIONS 1-9: 0
1. LITTLE INTEREST OR PLEASURE IN DOING THINGS: 0
2. FEELING DOWN, DEPRESSED OR HOPELESS: 0

## 2023-07-31 NOTE — PROGRESS NOTES
Nakul Dowell presents today for   Chief Complaint   Patient presents with    Annual Exam                 1. \"Have you been to the ER, urgent care clinic since your last visit? Hospitalized since your last visit? \" no    2. \"Have you seen or consulted any other health care providers outside of the [address] [place] [address] since your last visit? \" no     3. For patients aged 43-73: Has the patient had a colonoscopy / FIT/ Cologuard? Yes - no Care Gap present      If the patient is female:    4. For patients aged 43-66: Has the patient had a mammogram within the past 2 years? NA - based on age or sex      11. For patients aged 21-65: Has the patient had a pap smear?  NA - based on age or sex
hypertension    Colon adenomas    Cervical spinal stenosis         ASSESSMENT AND PLAN:  1. Hypertension. Controlled on current regimen. 2.  Fatty liver. Wt loss, exercise, abstinence reiterated  3. Dyslipidemia. VERY long discussion. Suggested further risk stratification with ca score to fully answer the question of need. I did remind him that ED can be considered a  marker for vascular problems  4. FH colon ca. Fiber, colo 2024  5. IFG. Resolved with wt loss  6. ED. Prn levitra refilled   7. Hypogonadism. He has chosen to come off replacement tx  8. Chronic pain. F/U pain clinic  9. Overweight. Congratulated him on maintaining the weight loss        RTC 7/24    Above conditions discussed at length and patient vocalized understanding. All questions answered to patient satisfaction     Diagnosis Orders   1. Physical exam  CBC with Auto Differential    Comprehensive Metabolic Panel    HEMOGLOBIN A1C W/O EAG    Lipid Panel    PSA Screening      2. Essential hypertension        3. IFG (impaired fasting glucose)        4. Dyslipidemia  CT CARDIAC CALCIUM SCORING      5. Chronic pain syndrome        6. Spondylosis of lumbar region without myelopathy or radiculopathy        7.  Erectile dysfunction, unspecified erectile dysfunction type  vardenafil (LEVITRA) 20 MG tablet

## 2024-01-16 RX ORDER — LOSARTAN POTASSIUM AND HYDROCHLOROTHIAZIDE 25; 100 MG/1; MG/1
TABLET ORAL
Qty: 90 TABLET | Refills: 3 | Status: SHIPPED | OUTPATIENT
Start: 2024-01-16

## 2024-01-16 RX ORDER — AMLODIPINE BESYLATE 2.5 MG/1
TABLET ORAL
Qty: 90 TABLET | Refills: 3 | Status: SHIPPED | OUTPATIENT
Start: 2024-01-16

## 2024-07-15 RX ORDER — ATORVASTATIN CALCIUM 10 MG/1
TABLET, FILM COATED ORAL
Qty: 90 TABLET | Refills: 3 | Status: SHIPPED | OUTPATIENT
Start: 2024-07-15

## 2024-07-25 ENCOUNTER — HOSPITAL ENCOUNTER (OUTPATIENT)
Facility: HOSPITAL | Age: 69
Setting detail: SPECIMEN
Discharge: HOME | End: 2024-07-25
Payer: COMMERCIAL

## 2024-07-25 DIAGNOSIS — Z00.00 PHYSICAL EXAM: ICD-10-CM

## 2024-07-25 LAB
ALBUMIN SERPL-MCNC: 4.4 G/DL (ref 3.4–5)
ALBUMIN/GLOB SERPL: 1.4 (ref 0.8–1.7)
ALP SERPL-CCNC: 92 U/L (ref 45–117)
ALT SERPL-CCNC: 81 U/L (ref 16–61)
ANION GAP SERPL CALC-SCNC: 6 MMOL/L (ref 3–18)
AST SERPL-CCNC: 50 U/L (ref 10–38)
BASOPHILS # BLD: 0.1 K/UL (ref 0–0.1)
BASOPHILS NFR BLD: 1 % (ref 0–2)
BILIRUB SERPL-MCNC: 0.5 MG/DL (ref 0.2–1)
BUN SERPL-MCNC: 12 MG/DL (ref 7–18)
BUN/CREAT SERPL: 10 (ref 12–20)
CALCIUM SERPL-MCNC: 9.5 MG/DL (ref 8.5–10.1)
CHLORIDE SERPL-SCNC: 103 MMOL/L (ref 100–111)
CHOLEST SERPL-MCNC: 232 MG/DL
CO2 SERPL-SCNC: 28 MMOL/L (ref 21–32)
CREAT SERPL-MCNC: 1.16 MG/DL (ref 0.6–1.3)
DIFFERENTIAL METHOD BLD: ABNORMAL
EOSINOPHIL # BLD: 0.1 K/UL (ref 0–0.4)
EOSINOPHIL NFR BLD: 1 % (ref 0–5)
ERYTHROCYTE [DISTWIDTH] IN BLOOD BY AUTOMATED COUNT: 12.9 % (ref 11.6–14.5)
GLOBULIN SER CALC-MCNC: 3.2 G/DL (ref 2–4)
GLUCOSE SERPL-MCNC: 124 MG/DL (ref 74–99)
HBA1C MFR BLD: 5.3 % (ref 4.2–5.6)
HCT VFR BLD AUTO: 45.5 % (ref 36–48)
HDLC SERPL-MCNC: 63 MG/DL (ref 40–60)
HDLC SERPL: 3.7 (ref 0–5)
HGB BLD-MCNC: 14.4 G/DL (ref 13–16)
IMM GRANULOCYTES # BLD AUTO: 0.1 K/UL (ref 0–0.04)
IMM GRANULOCYTES NFR BLD AUTO: 1 % (ref 0–0.5)
LDLC SERPL CALC-MCNC: 127.6 MG/DL (ref 0–100)
LIPID PANEL: ABNORMAL
LYMPHOCYTES # BLD: 1.9 K/UL (ref 0.9–3.6)
LYMPHOCYTES NFR BLD: 26 % (ref 21–52)
MCH RBC QN AUTO: 31.7 PG (ref 24–34)
MCHC RBC AUTO-ENTMCNC: 31.6 G/DL (ref 31–37)
MCV RBC AUTO: 100.2 FL (ref 78–100)
MONOCYTES # BLD: 0.8 K/UL (ref 0.05–1.2)
MONOCYTES NFR BLD: 11 % (ref 3–10)
NEUTS SEG # BLD: 4.4 K/UL (ref 1.8–8)
NEUTS SEG NFR BLD: 60 % (ref 40–73)
NRBC # BLD: 0 K/UL (ref 0–0.01)
NRBC BLD-RTO: 0 PER 100 WBC
PLATELET # BLD AUTO: 249 K/UL (ref 135–420)
PMV BLD AUTO: 11.6 FL (ref 9.2–11.8)
POTASSIUM SERPL-SCNC: 4.7 MMOL/L (ref 3.5–5.5)
PROT SERPL-MCNC: 7.6 G/DL (ref 6.4–8.2)
PSA SERPL-MCNC: 0.4 NG/ML (ref 0–4)
RBC # BLD AUTO: 4.54 M/UL (ref 4.35–5.65)
SODIUM SERPL-SCNC: 137 MMOL/L (ref 136–145)
TRIGL SERPL-MCNC: 207 MG/DL
VLDLC SERPL CALC-MCNC: 41.4 MG/DL
WBC # BLD AUTO: 7.2 K/UL (ref 4.6–13.2)

## 2024-07-25 PROCEDURE — 80061 LIPID PANEL: CPT

## 2024-07-25 PROCEDURE — 83036 HEMOGLOBIN GLYCOSYLATED A1C: CPT

## 2024-07-25 PROCEDURE — 85025 COMPLETE CBC W/AUTO DIFF WBC: CPT

## 2024-07-25 PROCEDURE — G0103 PSA SCREENING: HCPCS

## 2024-07-25 PROCEDURE — 80053 COMPREHEN METABOLIC PANEL: CPT

## 2024-07-25 PROCEDURE — 36415 COLL VENOUS BLD VENIPUNCTURE: CPT

## 2024-08-04 NOTE — PROGRESS NOTES
Nasal route once as needed    pregabalin (LYRICA) 75 MG capsule Take 1 capsule by mouth 2 times daily.    atorvastatin (LIPITOR) 10 MG tablet take 1 tablet by mouth once daily (Patient not taking: Reported on 8/6/2024)     No current facility-administered medications for this visit.     Allergies   Allergen Reactions    Lisinopril Other (See Comments)     ED     REVIEW OF SYSTEMS: colo 2021 Dr Valdez, sees Dr Basilio    Vitals:    08/06/24 0936   BP: 118/81   Pulse: 92   Resp: 16   Temp: 98.4 °F (36.9 °C)   TempSrc: Temporal   SpO2: 98%   Weight: 79.4 kg (175 lb)   Height: 1.803 m (5' 11\")   A&O x3  Affect is appropriate.  Mood stable  No apparent distress  Anicteric, no JVD, adenopathy or thyromegaly.   No carotid bruits or radiated murmur  Lungs clear to auscultation, no wheezes or rales  Heart showed regular rate and rhythm. No murmur, rubs, gallops  Abdomen soft nontender, no hepatosplenomegaly or masses.   Extremities without edema.  Pulses 1-2+ symmetrically  Rectal deferred     LABS  From 10/11 showed        hba1c 5.8, ldl-p 1008, chol 197, tg 200, hdl 45, ldl-c 112,                   psa 0.70, 2 hr   From 6/12  showed  gluc 104, cr 0.94, gfr 90,  alt 20, hba1c 5.7, ldl-p 2204, chol 197, tg 225, hdl 45, ldl-c 107,  tsh 0.62  From 11/12 showed gluc 100, cr 0.94, gfr 89                                ldl-p 1356                       test 316  From 5/13  showed  gluc 95,   cr 0.86                         hba1c 5.6, ldl-p 1183, chol 147, tg 171, hdl 53, ldl-c 60,            test 313  From 5/13 showed                                test 196, lh 3.0, psa 0.60  From 11/13 showed        hba1c 5.5,     chol 164, tg 165, hdl 47, ldl-c 84  From 3/14 showed   gluc 91,   cr 0.83, gfr 96,  alt 42,      chol 151, tg 160, hdl 42, ldl-c 77,   wbc 7.7,   hb 15.2, plt 249,          psa 0.60, esr 2, spep neg, cea 0.9, crp 1.30, IL-6 1.2  From 5/14 showed   gluc 156, cr 1.02, gfr>60, alt 61,                  wbc 12.8, hb

## 2024-08-06 ENCOUNTER — OFFICE VISIT (OUTPATIENT)
Facility: CLINIC | Age: 69
End: 2024-08-06
Payer: COMMERCIAL

## 2024-08-06 VITALS
HEIGHT: 71 IN | BODY MASS INDEX: 24.5 KG/M2 | WEIGHT: 175 LBS | SYSTOLIC BLOOD PRESSURE: 118 MMHG | OXYGEN SATURATION: 98 % | HEART RATE: 92 BPM | RESPIRATION RATE: 16 BRPM | TEMPERATURE: 98.4 F | DIASTOLIC BLOOD PRESSURE: 81 MMHG

## 2024-08-06 DIAGNOSIS — I10 ESSENTIAL HYPERTENSION: ICD-10-CM

## 2024-08-06 DIAGNOSIS — R73.01 IFG (IMPAIRED FASTING GLUCOSE): ICD-10-CM

## 2024-08-06 DIAGNOSIS — E78.5 DYSLIPIDEMIA: ICD-10-CM

## 2024-08-06 DIAGNOSIS — D12.6 COLON ADENOMAS: ICD-10-CM

## 2024-08-06 DIAGNOSIS — K76.0 METABOLIC DYSFUNCTION-ASSOCIATED STEATOTIC LIVER DISEASE (MASLD): ICD-10-CM

## 2024-08-06 DIAGNOSIS — Z00.00 PHYSICAL EXAM: Primary | ICD-10-CM

## 2024-08-06 PROCEDURE — 3074F SYST BP LT 130 MM HG: CPT | Performed by: INTERNAL MEDICINE

## 2024-08-06 PROCEDURE — 99397 PER PM REEVAL EST PAT 65+ YR: CPT | Performed by: INTERNAL MEDICINE

## 2024-08-06 PROCEDURE — 3079F DIAST BP 80-89 MM HG: CPT | Performed by: INTERNAL MEDICINE

## 2024-08-06 SDOH — ECONOMIC STABILITY: FOOD INSECURITY: WITHIN THE PAST 12 MONTHS, YOU WORRIED THAT YOUR FOOD WOULD RUN OUT BEFORE YOU GOT MONEY TO BUY MORE.: NEVER TRUE

## 2024-08-06 SDOH — ECONOMIC STABILITY: FOOD INSECURITY: WITHIN THE PAST 12 MONTHS, THE FOOD YOU BOUGHT JUST DIDN'T LAST AND YOU DIDN'T HAVE MONEY TO GET MORE.: NEVER TRUE

## 2024-08-06 SDOH — ECONOMIC STABILITY: INCOME INSECURITY: HOW HARD IS IT FOR YOU TO PAY FOR THE VERY BASICS LIKE FOOD, HOUSING, MEDICAL CARE, AND HEATING?: NOT HARD AT ALL

## 2024-08-06 ASSESSMENT — PATIENT HEALTH QUESTIONNAIRE - PHQ9
SUM OF ALL RESPONSES TO PHQ QUESTIONS 1-9: 0
2. FEELING DOWN, DEPRESSED OR HOPELESS: NOT AT ALL
SUM OF ALL RESPONSES TO PHQ QUESTIONS 1-9: 0
1. LITTLE INTEREST OR PLEASURE IN DOING THINGS: NOT AT ALL
SUM OF ALL RESPONSES TO PHQ QUESTIONS 1-9: 0
SUM OF ALL RESPONSES TO PHQ QUESTIONS 1-9: 0
SUM OF ALL RESPONSES TO PHQ9 QUESTIONS 1 & 2: 0

## 2025-01-10 RX ORDER — AMLODIPINE BESYLATE 2.5 MG/1
TABLET ORAL
Qty: 90 TABLET | Refills: 3 | Status: SHIPPED | OUTPATIENT
Start: 2025-01-10

## 2025-01-10 RX ORDER — LOSARTAN POTASSIUM AND HYDROCHLOROTHIAZIDE 25; 100 MG/1; MG/1
TABLET ORAL
Qty: 90 TABLET | Refills: 3 | Status: SHIPPED | OUTPATIENT
Start: 2025-01-10

## 2025-02-06 ENCOUNTER — HOSPITAL ENCOUNTER (OUTPATIENT)
Facility: HOSPITAL | Age: 70
Setting detail: SPECIMEN
Discharge: HOME | End: 2025-02-09
Payer: COMMERCIAL

## 2025-02-06 DIAGNOSIS — E78.5 DYSLIPIDEMIA: ICD-10-CM

## 2025-02-06 DIAGNOSIS — R73.01 IFG (IMPAIRED FASTING GLUCOSE): ICD-10-CM

## 2025-02-06 LAB
ALBUMIN SERPL-MCNC: 4.2 G/DL (ref 3.4–5)
ALBUMIN/GLOB SERPL: 1.3 (ref 0.8–1.7)
ALP SERPL-CCNC: 103 U/L (ref 45–117)
ALT SERPL-CCNC: 31 U/L (ref 16–61)
ANION GAP SERPL CALC-SCNC: 5 MMOL/L (ref 3–18)
AST SERPL-CCNC: 20 U/L (ref 10–38)
BILIRUB SERPL-MCNC: 0.6 MG/DL (ref 0.2–1)
BUN SERPL-MCNC: 14 MG/DL (ref 7–18)
BUN/CREAT SERPL: 15 (ref 12–20)
CALCIUM SERPL-MCNC: 9.7 MG/DL (ref 8.5–10.1)
CHLORIDE SERPL-SCNC: 110 MMOL/L (ref 100–111)
CHOLEST SERPL-MCNC: 227 MG/DL
CO2 SERPL-SCNC: 29 MMOL/L (ref 21–32)
CREAT SERPL-MCNC: 0.93 MG/DL (ref 0.6–1.3)
GLOBULIN SER CALC-MCNC: 3.3 G/DL (ref 2–4)
GLUCOSE SERPL-MCNC: 103 MG/DL (ref 74–99)
HBA1C MFR BLD: 5.7 % (ref 4.2–5.6)
HDLC SERPL-MCNC: 57 MG/DL (ref 40–60)
HDLC SERPL: 4 (ref 0–5)
LDLC SERPL CALC-MCNC: 127.8 MG/DL (ref 0–100)
LIPID PANEL: ABNORMAL
POTASSIUM SERPL-SCNC: 4.5 MMOL/L (ref 3.5–5.5)
PROT SERPL-MCNC: 7.5 G/DL (ref 6.4–8.2)
SODIUM SERPL-SCNC: 144 MMOL/L (ref 136–145)
TRIGL SERPL-MCNC: 211 MG/DL
VLDLC SERPL CALC-MCNC: 42.2 MG/DL

## 2025-02-06 PROCEDURE — 80053 COMPREHEN METABOLIC PANEL: CPT

## 2025-02-06 PROCEDURE — 83036 HEMOGLOBIN GLYCOSYLATED A1C: CPT

## 2025-02-06 PROCEDURE — 36415 COLL VENOUS BLD VENIPUNCTURE: CPT

## 2025-02-06 PROCEDURE — 80061 LIPID PANEL: CPT

## 2025-02-13 ENCOUNTER — OFFICE VISIT (OUTPATIENT)
Facility: CLINIC | Age: 70
End: 2025-02-13
Payer: COMMERCIAL

## 2025-02-13 VITALS
RESPIRATION RATE: 18 BRPM | HEIGHT: 71 IN | OXYGEN SATURATION: 100 % | TEMPERATURE: 98.3 F | WEIGHT: 171 LBS | BODY MASS INDEX: 23.94 KG/M2 | DIASTOLIC BLOOD PRESSURE: 74 MMHG | SYSTOLIC BLOOD PRESSURE: 138 MMHG | HEART RATE: 78 BPM

## 2025-02-13 DIAGNOSIS — Z00.00 PHYSICAL EXAM: Primary | ICD-10-CM

## 2025-02-13 DIAGNOSIS — R73.01 IFG (IMPAIRED FASTING GLUCOSE): ICD-10-CM

## 2025-02-13 DIAGNOSIS — K76.0 METABOLIC DYSFUNCTION-ASSOCIATED STEATOTIC LIVER DISEASE (MASLD): ICD-10-CM

## 2025-02-13 DIAGNOSIS — E78.5 DYSLIPIDEMIA: ICD-10-CM

## 2025-02-13 DIAGNOSIS — I10 ESSENTIAL HYPERTENSION: ICD-10-CM

## 2025-02-13 DIAGNOSIS — D12.6 COLON ADENOMAS: ICD-10-CM

## 2025-02-13 DIAGNOSIS — Z12.5 SCREENING FOR MALIGNANT NEOPLASM OF PROSTATE: ICD-10-CM

## 2025-02-13 PROCEDURE — 3078F DIAST BP <80 MM HG: CPT | Performed by: INTERNAL MEDICINE

## 2025-02-13 PROCEDURE — 99397 PER PM REEVAL EST PAT 65+ YR: CPT | Performed by: INTERNAL MEDICINE

## 2025-02-13 PROCEDURE — 3075F SYST BP GE 130 - 139MM HG: CPT | Performed by: INTERNAL MEDICINE

## 2025-02-13 SDOH — ECONOMIC STABILITY: FOOD INSECURITY: WITHIN THE PAST 12 MONTHS, YOU WORRIED THAT YOUR FOOD WOULD RUN OUT BEFORE YOU GOT MONEY TO BUY MORE.: NEVER TRUE

## 2025-02-13 SDOH — ECONOMIC STABILITY: FOOD INSECURITY: WITHIN THE PAST 12 MONTHS, THE FOOD YOU BOUGHT JUST DIDN'T LAST AND YOU DIDN'T HAVE MONEY TO GET MORE.: NEVER TRUE

## 2025-02-13 SDOH — ECONOMIC STABILITY: INCOME INSECURITY: IN THE LAST 12 MONTHS, WAS THERE A TIME WHEN YOU WERE NOT ABLE TO PAY THE MORTGAGE OR RENT ON TIME?: NO

## 2025-02-13 SDOH — ECONOMIC STABILITY: TRANSPORTATION INSECURITY
IN THE PAST 12 MONTHS, HAS LACK OF TRANSPORTATION KEPT YOU FROM MEETINGS, WORK, OR FROM GETTING THINGS NEEDED FOR DAILY LIVING?: NO

## 2025-02-13 SDOH — ECONOMIC STABILITY: TRANSPORTATION INSECURITY
IN THE PAST 12 MONTHS, HAS THE LACK OF TRANSPORTATION KEPT YOU FROM MEDICAL APPOINTMENTS OR FROM GETTING MEDICATIONS?: NO

## 2025-02-13 ASSESSMENT — PATIENT HEALTH QUESTIONNAIRE - PHQ9
SUM OF ALL RESPONSES TO PHQ QUESTIONS 1-9: 0
SUM OF ALL RESPONSES TO PHQ9 QUESTIONS 1 & 2: 0
SUM OF ALL RESPONSES TO PHQ QUESTIONS 1-9: 0
2. FEELING DOWN, DEPRESSED OR HOPELESS: NOT AT ALL
SUM OF ALL RESPONSES TO PHQ QUESTIONS 1-9: 0
SUM OF ALL RESPONSES TO PHQ QUESTIONS 1-9: 0
1. LITTLE INTEREST OR PLEASURE IN DOING THINGS: NOT AT ALL

## 2025-02-13 NOTE — PROGRESS NOTES
Jacob Barksdale presents today for   Chief Complaint   Patient presents with    Follow-up Chronic Condition           \"Have you been to the ER, urgent care clinic since your last visit?  Hospitalized since your last visit?\"    NO    “Have you seen or consulted any other health care providers outside of [facility] since your last visit?”    NO            
facility-administered medications for this visit.     Allergies   Allergen Reactions    Lisinopril Other (See Comments)     ED     REVIEW OF SYSTEMS: colo 2021 Dr Valdez, sees Dr Basilio    Vitals:    02/13/25 0937 02/13/25 0943   BP: (!) 166/82 138/74   Site:  Right Upper Arm   Position:  Sitting   Cuff Size:  Medium Adult   Pulse: 78    Resp: 18    Temp: 98.3 °F (36.8 °C)    TempSrc: Temporal    SpO2: 100%    Weight: 77.6 kg (171 lb)    Height: 1.803 m (5' 11\")    A&O x3  Affect is appropriate.  Mood stable  No apparent distress  Anicteric, no JVD, adenopathy or thyromegaly.   No carotid bruits or radiated murmur  Lungs clear to auscultation, no wheezes or rales  Heart showed regular rate and rhythm. No murmur, rubs, gallops  Abdomen soft nontender, no hepatosplenomegaly or masses.   Extremities without edema.  Pulses 1-2+ symmetrically  Prostate and rectal deferred    LABS  From 10/11 showed        hba1c 5.8, ldl-p 1008, chol 197, tg 200, hdl 45, ldl-c 112,                   psa 0.70, 2 hr   From 6/12  showed  gluc 104, cr 0.94, gfr 90,  alt 20, hba1c 5.7, ldl-p 2204, chol 197, tg 225, hdl 45, ldl-c 107,  tsh 0.62  From 11/12 showed gluc 100, cr 0.94, gfr 89                                ldl-p 1356                       test 316  From 5/13  showed  gluc 95,   cr 0.86                         hba1c 5.6, ldl-p 1183, chol 147, tg 171, hdl 53, ldl-c 60,            test 313  From 5/13 showed                                test 196, lh 3.0, psa 0.60  From 11/13 showed        hba1c 5.5,     chol 164, tg 165, hdl 47, ldl-c 84  From 3/14 showed   gluc 91,   cr 0.83, gfr 96,  alt 42,      chol 151, tg 160, hdl 42, ldl-c 77,   wbc 7.7,   hb 15.2, plt 249,          psa 0.60, esr 2, spep neg, cea 0.9, crp 1.30, IL-6 1.2  From 5/14 showed   gluc 156, cr 1.02, gfr>60, alt 61,                  wbc 12.8, hb 14.6, plt 231,          b12 722, fol>24(on pred)  From 10/14 showed gluc 101, cr 0.90, gfr 93,  alt 45, hba1c

## 2025-03-25 NOTE — TELEPHONE ENCOUNTER
From: Abraham Roblero  To: Iona Crouch MD  Sent: 6/1/2017 6:53 PM EDT  Subject: Medication Renewal Request    Original authorizing provider: MD Abraham Braga BALBIR Joyner would like a refill of the following medications:  diazePAM (VALIUM) 10 mg tablet Iona Crouch MD]    Preferred pharmacy: [address] [place] [address] Ave: yes

## 2025-06-19 ENCOUNTER — PATIENT MESSAGE (OUTPATIENT)
Facility: CLINIC | Age: 70
End: 2025-06-19

## 2025-08-12 RX ORDER — AMLODIPINE BESYLATE 2.5 MG/1
2.5 TABLET ORAL DAILY
Qty: 30 TABLET | Refills: 0 | Status: SHIPPED | OUTPATIENT
Start: 2025-08-12

## 2025-08-12 RX ORDER — LOSARTAN POTASSIUM AND HYDROCHLOROTHIAZIDE 25; 100 MG/1; MG/1
1 TABLET ORAL DAILY
Qty: 30 TABLET | Refills: 0 | Status: SHIPPED | OUTPATIENT
Start: 2025-08-12

## 2025-08-14 ENCOUNTER — HOSPITAL ENCOUNTER (OUTPATIENT)
Facility: HOSPITAL | Age: 70
Setting detail: SPECIMEN
Discharge: HOME | End: 2025-08-17
Payer: COMMERCIAL

## 2025-08-14 DIAGNOSIS — R73.01 IFG (IMPAIRED FASTING GLUCOSE): ICD-10-CM

## 2025-08-14 DIAGNOSIS — Z00.00 PHYSICAL EXAM: ICD-10-CM

## 2025-08-14 DIAGNOSIS — Z12.5 SCREENING FOR MALIGNANT NEOPLASM OF PROSTATE: ICD-10-CM

## 2025-08-14 DIAGNOSIS — E78.5 DYSLIPIDEMIA: ICD-10-CM

## 2025-08-14 DIAGNOSIS — I10 ESSENTIAL HYPERTENSION: ICD-10-CM

## 2025-08-14 LAB
ALBUMIN SERPL-MCNC: 4.4 G/DL (ref 3.4–5)
ALBUMIN/GLOB SERPL: 1.4 (ref 0.8–1.7)
ALP SERPL-CCNC: 82 U/L (ref 45–117)
ALT SERPL-CCNC: 31 U/L (ref 10–50)
ANION GAP SERPL CALC-SCNC: 16 MMOL/L (ref 3–18)
AST SERPL-CCNC: 29 U/L (ref 10–38)
BASOPHILS # BLD: 0.05 K/UL (ref 0–0.1)
BASOPHILS NFR BLD: 0.7 % (ref 0–2)
BILIRUB SERPL-MCNC: 0.5 MG/DL (ref 0.2–1)
BUN SERPL-MCNC: 14 MG/DL (ref 6–23)
BUN/CREAT SERPL: 15 (ref 12–20)
CALCIUM SERPL-MCNC: 10.3 MG/DL (ref 8.5–10.1)
CHLORIDE SERPL-SCNC: 100 MMOL/L (ref 98–107)
CHOLEST SERPL-MCNC: 255 MG/DL
CO2 SERPL-SCNC: 26 MMOL/L (ref 21–32)
CREAT SERPL-MCNC: 0.93 MG/DL (ref 0.6–1.3)
DIFFERENTIAL METHOD BLD: NORMAL
EOSINOPHIL # BLD: 0.12 K/UL (ref 0–0.4)
EOSINOPHIL NFR BLD: 1.7 % (ref 0–5)
ERYTHROCYTE [DISTWIDTH] IN BLOOD BY AUTOMATED COUNT: 12.7 % (ref 11.6–14.5)
GLOBULIN SER CALC-MCNC: 3.1 G/DL (ref 2–4)
GLUCOSE SERPL-MCNC: 96 MG/DL (ref 74–108)
HBA1C MFR BLD: 5.4 % (ref 4.2–5.6)
HCT VFR BLD AUTO: 42.6 % (ref 36–48)
HDLC SERPL-MCNC: 65 MG/DL (ref 40–60)
HDLC SERPL: 3.9 (ref 0–5)
HGB BLD-MCNC: 13.8 G/DL (ref 13–16)
IMM GRANULOCYTES # BLD AUTO: 0.03 K/UL (ref 0–0.04)
IMM GRANULOCYTES NFR BLD AUTO: 0.4 % (ref 0–0.5)
LDLC SERPL CALC-MCNC: 153 MG/DL (ref 0–100)
LYMPHOCYTES # BLD: 2.3 K/UL (ref 0.9–3.6)
LYMPHOCYTES NFR BLD: 31.6 % (ref 21–52)
MCH RBC QN AUTO: 30.7 PG (ref 24–34)
MCHC RBC AUTO-ENTMCNC: 32.4 G/DL (ref 31–37)
MCV RBC AUTO: 94.7 FL (ref 78–100)
MONOCYTES # BLD: 0.71 K/UL (ref 0.05–1.2)
MONOCYTES NFR BLD: 9.8 % (ref 3–10)
NEUTS SEG # BLD: 4.06 K/UL (ref 1.8–8)
NEUTS SEG NFR BLD: 55.8 % (ref 40–73)
NRBC # BLD: 0 K/UL (ref 0–0.01)
NRBC BLD-RTO: 0 PER 100 WBC
PLATELET # BLD AUTO: 256 K/UL (ref 135–420)
PMV BLD AUTO: 11.5 FL (ref 9.2–11.8)
POTASSIUM SERPL-SCNC: 4.3 MMOL/L (ref 3.5–5.5)
PROT SERPL-MCNC: 7.5 G/DL (ref 6.4–8.2)
PSA SERPL-MCNC: 0.5 NG/ML (ref 1.4–4.4)
RBC # BLD AUTO: 4.5 M/UL (ref 4.35–5.65)
SODIUM SERPL-SCNC: 142 MMOL/L (ref 136–145)
TRIGL SERPL-MCNC: 186 MG/DL (ref 0–150)
VLDLC SERPL CALC-MCNC: 37 MG/DL
WBC # BLD AUTO: 7.3 K/UL (ref 4.6–13.2)

## 2025-08-14 PROCEDURE — G0103 PSA SCREENING: HCPCS

## 2025-08-14 PROCEDURE — 36415 COLL VENOUS BLD VENIPUNCTURE: CPT

## 2025-08-14 PROCEDURE — 85025 COMPLETE CBC W/AUTO DIFF WBC: CPT

## 2025-08-14 PROCEDURE — 80053 COMPREHEN METABOLIC PANEL: CPT

## 2025-08-14 PROCEDURE — 83036 HEMOGLOBIN GLYCOSYLATED A1C: CPT

## 2025-08-14 PROCEDURE — 80061 LIPID PANEL: CPT

## 2025-08-20 RX ORDER — AMLODIPINE BESYLATE 2.5 MG/1
2.5 TABLET ORAL DAILY
Qty: 90 TABLET | Refills: 3 | Status: SHIPPED | OUTPATIENT
Start: 2025-08-20 | End: 2025-08-21 | Stop reason: DRUGHIGH

## 2025-08-20 RX ORDER — LOSARTAN POTASSIUM AND HYDROCHLOROTHIAZIDE 25; 100 MG/1; MG/1
1 TABLET ORAL DAILY
Qty: 90 TABLET | Refills: 3 | Status: SHIPPED | OUTPATIENT
Start: 2025-08-20

## 2025-08-21 ENCOUNTER — OFFICE VISIT (OUTPATIENT)
Facility: CLINIC | Age: 70
End: 2025-08-21
Payer: COMMERCIAL

## 2025-08-21 VITALS
OXYGEN SATURATION: 99 % | SYSTOLIC BLOOD PRESSURE: 144 MMHG | RESPIRATION RATE: 16 BRPM | HEART RATE: 78 BPM | DIASTOLIC BLOOD PRESSURE: 82 MMHG | WEIGHT: 169 LBS | BODY MASS INDEX: 23.66 KG/M2 | HEIGHT: 71 IN | TEMPERATURE: 98.3 F

## 2025-08-21 DIAGNOSIS — Z00.00 PHYSICAL EXAM: Primary | ICD-10-CM

## 2025-08-21 DIAGNOSIS — R73.01 IFG (IMPAIRED FASTING GLUCOSE): ICD-10-CM

## 2025-08-21 DIAGNOSIS — G89.4 CHRONIC PAIN SYNDROME: ICD-10-CM

## 2025-08-21 DIAGNOSIS — E78.5 DYSLIPIDEMIA: ICD-10-CM

## 2025-08-21 DIAGNOSIS — I10 ESSENTIAL HYPERTENSION: ICD-10-CM

## 2025-08-21 DIAGNOSIS — D12.6 COLON ADENOMAS: ICD-10-CM

## 2025-08-21 DIAGNOSIS — K76.0 METABOLIC DYSFUNCTION-ASSOCIATED STEATOTIC LIVER DISEASE (MASLD): ICD-10-CM

## 2025-08-21 PROCEDURE — 3077F SYST BP >= 140 MM HG: CPT | Performed by: INTERNAL MEDICINE

## 2025-08-21 PROCEDURE — 3079F DIAST BP 80-89 MM HG: CPT | Performed by: INTERNAL MEDICINE

## 2025-08-21 PROCEDURE — 99397 PER PM REEVAL EST PAT 65+ YR: CPT | Performed by: INTERNAL MEDICINE

## 2025-08-21 RX ORDER — AMLODIPINE BESYLATE 5 MG/1
5 TABLET ORAL DAILY
Qty: 90 TABLET | Refills: 1 | Status: SHIPPED | OUTPATIENT
Start: 2025-08-21

## 2025-08-21 ASSESSMENT — PATIENT HEALTH QUESTIONNAIRE - PHQ9
SUM OF ALL RESPONSES TO PHQ QUESTIONS 1-9: 0
2. FEELING DOWN, DEPRESSED OR HOPELESS: NOT AT ALL
1. LITTLE INTEREST OR PLEASURE IN DOING THINGS: NOT AT ALL
SUM OF ALL RESPONSES TO PHQ QUESTIONS 1-9: 0

## (undated) DEVICE — STERILE POLYISOPRENE POWDER-FREE SURGICAL GLOVES: Brand: PROTEXIS

## (undated) DEVICE — 3M™ TEGADERM™ TRANSPARENT FILM DRESSING FRAME STYLE, 1626W, 4 IN X 4-3/4 IN (10 CM X 12 CM), 50/CT 4CT/CASE: Brand: 3M™ TEGADERM™

## (undated) DEVICE — PACKING 8004000 NEURAY 200PK 13X13MM: Brand: NEURAY ®

## (undated) DEVICE — INSULATED BLADE ELECTRODE: Brand: EDGE

## (undated) DEVICE — PREP CHLORAPREP 10.5 ML ORG --

## (undated) DEVICE — SPIROMETER INCENT 2500ML W ONE W VLV

## (undated) DEVICE — SYRINGE MED 3ML NDL 22GA L1 1/2IN REG BVL SFGLDE

## (undated) DEVICE — STOCKING COMPR M L29-31IN REG 19MMHG ANK 8-9IN CALF 12-15IN

## (undated) DEVICE — REM POLYHESIVE ADULT PATIENT RETURN ELECTRODE: Brand: VALLEYLAB

## (undated) DEVICE — 10FR FRAZIER SUCTION HANDLE: Brand: CARDINAL HEALTH

## (undated) DEVICE — GOWN,REINFORCED,POLY,AURORA,XLARGE,STRL: Brand: MEDLINE

## (undated) DEVICE — BLANKET WRM AD W50XL85.8IN PACU FULL BODY FORC AIR

## (undated) DEVICE — APPLIER CLP L9.38IN M LIG TI DISP STR RNG HNDL LIGACLP

## (undated) DEVICE — SSC BONE WAX: Brand: SSC BONE WAX

## (undated) DEVICE — FLEX ADVANTAGE 3000CC: Brand: FLEX ADVANTAGE

## (undated) DEVICE — DRAIN SURG W7MMXL20CM SIL FULL PERF HUBLESS FLAT RADPQ STRP

## (undated) DEVICE — OPTIFOAM GENTLE SA, POSTOP, 4X8: Brand: MEDLINE

## (undated) DEVICE — SCREW EXT FIX L14MM FOR DISTRCTN

## (undated) DEVICE — COLLAR CERV L H3.25X23IN M DENS FOAM COT STOCK CVR LO

## (undated) DEVICE — APPLICATOR BNDG 1MM ADH PREMIERPRO EXOFIN

## (undated) DEVICE — Device

## (undated) DEVICE — POWDER HEMOSTAT GEL 1GR --

## (undated) DEVICE — SPONGE DISSECT PNUT SM 3/8IN -- 5/PK

## (undated) DEVICE — SUTURE VCRL SZ 3-0 L27IN ABSRB UD L26MM SH 1/2 CIR J416H

## (undated) DEVICE — KIT CLN UP BON SECOURS MARYV

## (undated) DEVICE — 3.0MM PRECISION NEURO (MATCH HEAD)

## (undated) DEVICE — INTENDED FOR TISSUE SEPARATION, AND OTHER PROCEDURES THAT REQUIRE A SHARP SURGICAL BLADE TO PUNCTURE OR CUT.: Brand: BARD-PARKER SAFETY BLADES SIZE 10, STERILE

## (undated) DEVICE — GARMENT,MEDLINE,DVT,INT,CALF,MED, GEN2: Brand: MEDLINE